# Patient Record
Sex: MALE | Race: WHITE | Employment: OTHER | URBAN - METROPOLITAN AREA
[De-identification: names, ages, dates, MRNs, and addresses within clinical notes are randomized per-mention and may not be internally consistent; named-entity substitution may affect disease eponyms.]

---

## 2017-03-16 ENCOUNTER — OFFICE VISIT (OUTPATIENT)
Dept: INTERNAL MEDICINE CLINIC | Age: 62
End: 2017-03-16
Payer: COMMERCIAL

## 2017-03-16 VITALS
BODY MASS INDEX: 35.29 KG/M2 | HEIGHT: 78 IN | WEIGHT: 305 LBS | DIASTOLIC BLOOD PRESSURE: 76 MMHG | SYSTOLIC BLOOD PRESSURE: 122 MMHG

## 2017-03-16 DIAGNOSIS — E03.9 HYPOTHYROIDISM, UNSPECIFIED TYPE: ICD-10-CM

## 2017-03-16 DIAGNOSIS — I10 ESSENTIAL HYPERTENSION: Primary | ICD-10-CM

## 2017-03-16 DIAGNOSIS — J84.10 POSTINFLAMMATORY PULMONARY FIBROSIS (HCC): ICD-10-CM

## 2017-03-16 DIAGNOSIS — Z79.52 CURRENT CHRONIC USE OF SYSTEMIC STEROIDS: ICD-10-CM

## 2017-03-16 PROCEDURE — 1036F TOBACCO NON-USER: CPT | Performed by: INTERNAL MEDICINE

## 2017-03-16 PROCEDURE — G8484 FLU IMMUNIZE NO ADMIN: HCPCS | Performed by: INTERNAL MEDICINE

## 2017-03-16 PROCEDURE — 3017F COLORECTAL CA SCREEN DOC REV: CPT | Performed by: INTERNAL MEDICINE

## 2017-03-16 PROCEDURE — G8419 CALC BMI OUT NRM PARAM NOF/U: HCPCS | Performed by: INTERNAL MEDICINE

## 2017-03-16 PROCEDURE — G8427 DOCREV CUR MEDS BY ELIG CLIN: HCPCS | Performed by: INTERNAL MEDICINE

## 2017-03-16 PROCEDURE — 99213 OFFICE O/P EST LOW 20 MIN: CPT | Performed by: INTERNAL MEDICINE

## 2017-03-16 RX ORDER — IBUPROFEN 200 MG
200 TABLET ORAL EVERY 6 HOURS PRN
Qty: 120 TABLET | Refills: 3 | Status: SHIPPED | OUTPATIENT
Start: 2017-03-16 | End: 2017-09-19 | Stop reason: ALTCHOICE

## 2017-03-20 RX ORDER — IBUPROFEN 800 MG/1
800 TABLET ORAL 2 TIMES DAILY
Qty: 120 TABLET | Refills: 1 | Status: SHIPPED | OUTPATIENT
Start: 2017-03-20 | End: 2017-10-02 | Stop reason: SDUPTHER

## 2017-03-27 DIAGNOSIS — Z79.52 CURRENT CHRONIC USE OF SYSTEMIC STEROIDS: ICD-10-CM

## 2017-04-03 DIAGNOSIS — E03.9 HYPOTHYROIDISM: ICD-10-CM

## 2017-04-04 RX ORDER — LEVOTHYROXINE SODIUM 0.03 MG/1
TABLET ORAL
Qty: 90 TABLET | Refills: 0 | Status: SHIPPED | OUTPATIENT
Start: 2017-04-04 | End: 2017-07-02 | Stop reason: SDUPTHER

## 2017-04-17 RX ORDER — LANSOPRAZOLE 30 MG/1
CAPSULE, DELAYED RELEASE ORAL
Qty: 30 CAPSULE | Refills: 3 | Status: SHIPPED | OUTPATIENT
Start: 2017-04-17 | End: 2017-10-02 | Stop reason: SDUPTHER

## 2017-04-28 DIAGNOSIS — I15.9 SECONDARY HYPERTENSION, UNSPECIFIED: ICD-10-CM

## 2017-04-28 RX ORDER — BENAZEPRIL HYDROCHLORIDE 40 MG/1
TABLET, FILM COATED ORAL
Qty: 90 TABLET | Refills: 3 | Status: SHIPPED | OUTPATIENT
Start: 2017-04-28 | End: 2018-05-29 | Stop reason: SDUPTHER

## 2017-05-04 ENCOUNTER — OFFICE VISIT (OUTPATIENT)
Dept: INTERNAL MEDICINE CLINIC | Age: 62
End: 2017-05-04
Payer: COMMERCIAL

## 2017-05-04 VITALS
BODY MASS INDEX: 34.94 KG/M2 | SYSTOLIC BLOOD PRESSURE: 130 MMHG | HEIGHT: 78 IN | DIASTOLIC BLOOD PRESSURE: 84 MMHG | WEIGHT: 302 LBS

## 2017-05-04 DIAGNOSIS — J22 ACUTE LOWER RESPIRATORY TRACT INFECTION: Primary | ICD-10-CM

## 2017-05-04 PROCEDURE — 1036F TOBACCO NON-USER: CPT | Performed by: INTERNAL MEDICINE

## 2017-05-04 PROCEDURE — 3017F COLORECTAL CA SCREEN DOC REV: CPT | Performed by: INTERNAL MEDICINE

## 2017-05-04 PROCEDURE — G8427 DOCREV CUR MEDS BY ELIG CLIN: HCPCS | Performed by: INTERNAL MEDICINE

## 2017-05-04 PROCEDURE — 99213 OFFICE O/P EST LOW 20 MIN: CPT | Performed by: INTERNAL MEDICINE

## 2017-05-04 PROCEDURE — G8417 CALC BMI ABV UP PARAM F/U: HCPCS | Performed by: INTERNAL MEDICINE

## 2017-05-04 RX ORDER — AZITHROMYCIN 250 MG/1
250 TABLET, FILM COATED ORAL DAILY
Qty: 10 TABLET | Refills: 0 | Status: SHIPPED | OUTPATIENT
Start: 2017-05-04 | End: 2017-05-14

## 2017-05-04 RX ORDER — LEVOFLOXACIN 500 MG/1
TABLET, FILM COATED ORAL
COMMUNITY
Start: 2017-04-03 | End: 2017-05-04 | Stop reason: ALTCHOICE

## 2017-05-04 RX ORDER — PREDNISONE 20 MG/1
TABLET ORAL
Qty: 15 TABLET | Refills: 0 | Status: SHIPPED | OUTPATIENT
Start: 2017-05-04 | End: 2017-09-19 | Stop reason: SDUPTHER

## 2017-05-04 RX ORDER — AMOXICILLIN AND CLAVULANATE POTASSIUM 875; 125 MG/1; MG/1
1 TABLET, FILM COATED ORAL 2 TIMES DAILY
Qty: 20 TABLET | Refills: 0 | Status: SHIPPED | OUTPATIENT
Start: 2017-05-04 | End: 2017-05-14

## 2017-05-04 ASSESSMENT — PATIENT HEALTH QUESTIONNAIRE - PHQ9
SUM OF ALL RESPONSES TO PHQ QUESTIONS 1-9: 0
SUM OF ALL RESPONSES TO PHQ9 QUESTIONS 1 & 2: 0
2. FEELING DOWN, DEPRESSED OR HOPELESS: 0
1. LITTLE INTEREST OR PLEASURE IN DOING THINGS: 0

## 2017-05-05 DIAGNOSIS — I15.9 SECONDARY HYPERTENSION, UNSPECIFIED: ICD-10-CM

## 2017-05-11 RX ORDER — AMLODIPINE BESYLATE 10 MG/1
TABLET ORAL
Qty: 90 TABLET | Refills: 3 | Status: SHIPPED | OUTPATIENT
Start: 2017-05-11 | End: 2018-05-09 | Stop reason: SDUPTHER

## 2017-07-02 DIAGNOSIS — E03.9 HYPOTHYROIDISM: ICD-10-CM

## 2017-07-05 RX ORDER — LEVOTHYROXINE SODIUM 0.03 MG/1
TABLET ORAL
Qty: 90 TABLET | Refills: 0 | Status: SHIPPED | OUTPATIENT
Start: 2017-07-05 | End: 2017-09-19 | Stop reason: SDUPTHER

## 2017-07-09 DIAGNOSIS — E03.9 HYPOTHYROIDISM: ICD-10-CM

## 2017-07-10 RX ORDER — LEVOTHYROXINE SODIUM 0.03 MG/1
TABLET ORAL
Qty: 90 TABLET | Refills: 3 | Status: SHIPPED | OUTPATIENT
Start: 2017-07-10 | End: 2018-07-30 | Stop reason: SDUPTHER

## 2017-09-19 ENCOUNTER — OFFICE VISIT (OUTPATIENT)
Dept: INTERNAL MEDICINE CLINIC | Age: 62
End: 2017-09-19
Payer: COMMERCIAL

## 2017-09-19 VITALS
HEIGHT: 78 IN | WEIGHT: 297 LBS | BODY MASS INDEX: 34.36 KG/M2 | SYSTOLIC BLOOD PRESSURE: 132 MMHG | DIASTOLIC BLOOD PRESSURE: 78 MMHG

## 2017-09-19 DIAGNOSIS — N52.9 ERECTILE DYSFUNCTION, UNSPECIFIED ERECTILE DYSFUNCTION TYPE: ICD-10-CM

## 2017-09-19 DIAGNOSIS — Z23 NEED FOR VACCINATION: ICD-10-CM

## 2017-09-19 DIAGNOSIS — G89.29 CHRONIC BILATERAL LOW BACK PAIN WITHOUT SCIATICA: ICD-10-CM

## 2017-09-19 DIAGNOSIS — I10 ESSENTIAL HYPERTENSION: Primary | ICD-10-CM

## 2017-09-19 DIAGNOSIS — M54.50 CHRONIC BILATERAL LOW BACK PAIN WITHOUT SCIATICA: ICD-10-CM

## 2017-09-19 DIAGNOSIS — T38.0X5A STEROID MYOPATHY: ICD-10-CM

## 2017-09-19 DIAGNOSIS — G72.0 STEROID MYOPATHY: ICD-10-CM

## 2017-09-19 DIAGNOSIS — F33.42 RECURRENT MAJOR DEPRESSIVE EPISODES, IN FULL REMISSION (HCC): ICD-10-CM

## 2017-09-19 DIAGNOSIS — J84.10 POSTINFLAMMATORY PULMONARY FIBROSIS (HCC): ICD-10-CM

## 2017-09-19 PROCEDURE — 1036F TOBACCO NON-USER: CPT | Performed by: INTERNAL MEDICINE

## 2017-09-19 PROCEDURE — G8417 CALC BMI ABV UP PARAM F/U: HCPCS | Performed by: INTERNAL MEDICINE

## 2017-09-19 PROCEDURE — 99213 OFFICE O/P EST LOW 20 MIN: CPT | Performed by: INTERNAL MEDICINE

## 2017-09-19 PROCEDURE — 3017F COLORECTAL CA SCREEN DOC REV: CPT | Performed by: INTERNAL MEDICINE

## 2017-09-19 PROCEDURE — 90686 IIV4 VACC NO PRSV 0.5 ML IM: CPT | Performed by: INTERNAL MEDICINE

## 2017-09-19 PROCEDURE — 90471 IMMUNIZATION ADMIN: CPT | Performed by: INTERNAL MEDICINE

## 2017-09-19 PROCEDURE — G8427 DOCREV CUR MEDS BY ELIG CLIN: HCPCS | Performed by: INTERNAL MEDICINE

## 2017-09-19 RX ORDER — B-COMPLEX WITH VITAMIN C
1 TABLET ORAL DAILY
Qty: 30 TABLET | Refills: 0 | Status: SHIPPED | OUTPATIENT
Start: 2017-09-19 | End: 2020-01-01

## 2017-09-19 RX ORDER — SILDENAFIL 100 MG/1
100 TABLET, FILM COATED ORAL PRN
Qty: 30 TABLET | Refills: 3 | Status: SHIPPED | OUTPATIENT
Start: 2017-09-19 | End: 2020-01-01

## 2017-09-19 NOTE — MR AVS SNAPSHOT
After Visit Summary             Rocio Nascimento   2017 9:00 AM   Office Visit    Description:  Male : 1955   Provider:  Kenya Olvera MD   Department:  [facility]              Your Follow-Up and Future Appointments         Below is a list of your follow-up and future appointments. This may not be a complete list as you may have made appointments directly with providers that we are not aware of or your providers may have made some for you. Please call your providers to confirm appointments. It is important to keep your appointments. Please bring your current insurance card, photo ID, co-pay, and all medication bottles to your appointment. If self-pay, payment is expected at the time of service. Your To-Do List     Future Appointments Provider Department Dept Phone    3/20/2018 9:00 AM Kenya Olvera MD [facility] [phone]    Please arrive 15 minutes prior to appointment, bring photo ID and insurance card. Information from Your Visit        Department     Name Address Phone Fax    [facility] [id number] [phone]      You Were Seen for:         Comments    Need for vaccination   [[id number]]         Vital Signs     Blood Pressure Height Weight Body Mass Index Smoking Status       132/78 6' 6.35\" (1.99 m) 297 lb (134.7 kg) 34.02 kg/m2 Former Smoker       Additional Information about your Body Mass Index (BMI)           Your BMI as listed above is considered obese (30 or more). BMI is an estimate of body fat, calculated from your height and weight. The higher your BMI, the greater your risk of heart disease, high blood pressure, type 2 diabetes, stroke, gallstones, arthritis, sleep apnea, and certain cancers. BMI is not perfect. It may overestimate body fat in athletes and people who are more muscular.   Even a small weight loss (between 5 and 10 amLODIPine (NORVASC) 10 MG tablet TAKE ONE TABLET BY MOUTH ONCE DAILY    benazepril (LOTENSIN) 40 MG tablet TAKE ONE TABLET BY MOUTH ONCE DAILY    lansoprazole (PREVACID) 30 MG delayed release capsule TAKE ONE CAPSULE BY MOUTH DAILY    ibuprofen (ADVIL;MOTRIN) 800 MG tablet Take 1 tablet by mouth 2 times daily    PROAIR  (90 BASE) MCG/ACT inhaler     furosemide (LASIX) 20 MG tablet Take 1 tablet by mouth daily    predniSONE (DELTASONE) 20 MG tablet Take 20 mg by mouth daily     hydrALAZINE (APRESOLINE) 25 MG tablet Take 1 tablet by mouth 3 times daily as needed. sodium chloride (ALTAMIST SPRAY) 0.65 % nasal spray 1 spray by Nasal route as needed for Congestion. DULoxetine (CYMBALTA) 30 MG capsule       Allergies           No Known Allergies      We Ordered/Performed the following           INFLUENZA, QUADV, 3 YRS AND OLDER, IM, PF, PREFILL SYR OR SDV, 0.5ML (FLUZONE QUADV, PF)     [facility] [employer]     Scheduling Instructions:    [facility]  [address], [address]  [phone]    Comments: The patient can be scheduled with any member of the group, including the provider with the first available appointments.           Additional Information        Basic Information     Date Of Birth Sex Race Ethnicity Preferred Language    1955 Male White Non-/Non  English      Problem List as of 9/19/2017  Date Reviewed: 9/19/2017                Bronchiectasis ([address]    Hernia, umbilical    Myopathy    Essential hypertension    Depressive disorder, not elsewhere classified    Impotence of organic origin    Hypothyroidism    Insomnia    Postinflammatory pulmonary fibrosis ([address]      Immunizations as of 9/19/2017     Name Date    Influenza Vaccine, unspecified formulation 10/5/2016    Influenza Virus Vaccine 9/21/2015, 9/25/2014    Influenza, Quadrivalent, 3 yrs and above, IM, Preservative Free 9/19/2017    Tdap (Boostrix, Adacel) 10/5/2016

## 2017-09-19 NOTE — PROGRESS NOTES
Hypothyroidism     The patient reports HTN been well controlled on current medication. No complaints of dizziness, shortness of breath or chest pain. The patient does not check his BP at home on hydralazine and norvasc    pulm fibrosis stable daily predniosone   Ct chest no changes       Review of Systems  Past Medical History:   Diagnosis Date    Depressive disorder, not elsewhere classified     Impotence of organic origin     Insomnia, unspecified     Myopathy, unspecified     Postinflammatory pulmonary fibrosis ([address]     Unspecified essential hypertension     Unspecified hypothyroidism      Social History   Substance Use Topics    Smoking status: Former Smoker     Quit date: 2/18/2005    Smokeless tobacco: Never Used    Alcohol use 0.0 oz/week     0 Standard drinks or equivalent per week      Comment: every weekend- 6 [employer]       ROS  CVS no chest pain no sob no orthopnea  Resp no cough   General no weight loss no fatigue no fever      Objective:   Physical Exam    Blood pressure 132/78, height 6' 6.35\" (1.99 m), weight 297 lb (134.7 kg). General Appearance NAD conversant  Neck FROM supple no JVD  Lungs normal effort Clear to auscultation  Cardio regular rhythm no murmur  Abdomen Soft nontender no HSM  Ext no edema no cyanosis no clubbing   Skin no rashes no ulcers  Neuro no focal deficits   Musculoskeletal no spinal tenderness no kyphosis     Ct scan     Findings:          The lungs again demonstrate severe pulmonary fibrosis.  There are multiple areas of honeycombing again noted.  Extensive groundglass opacity and septal thickening again noted.  There is bronchiectasis.  All of these findings are stable.  I see no definite new, superimposed acute findings.  There are    several 1.5 cm or less mediastinal lymph nodes which are stable.  The pericardium is unremarkable.  There are gallstones in the gallbladder.        Impression:    1.  Essentially stable exam.      Assessment:      1.  Essential hypertension control good     2. Need for vaccination  INFLUENZA, QUADV, 3 YRS AND OLDER, IM, PF, PREFILL SYR OR SDV, 0.5ML (FLUZONE QUADV, PF)   3. 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 Chronic bilateral low back pain without sciatica  [facility]    Calcium Carbonate-Vitamin D (OYSTER SHELL CALCIUM/D) 500-200 MG-UNIT TABS   5. Erectile dysfunction, unspecified erectile dysfunction type  sildenafil (VIAGRA) 100 MG tablet   6.  Postinflammatory pulmonary fibrosis (HCC)  Need continued prednisone          Plan:

## 2017-09-28 ENCOUNTER — HOSPITAL ENCOUNTER (OUTPATIENT)
Dept: PAIN MANAGEMENT | Age: 62
Discharge: HOME | End: 2017-09-28
Payer: COMMERCIAL

## 2017-09-28 VITALS
WEIGHT: 297 LBS | BODY MASS INDEX: 34.36 KG/M2 | TEMPERATURE: 98.1 F | RESPIRATION RATE: 22 BRPM | HEART RATE: 75 BPM | OXYGEN SATURATION: 90 % | DIASTOLIC BLOOD PRESSURE: 91 MMHG | SYSTOLIC BLOOD PRESSURE: 128 MMHG | HEIGHT: 78 IN

## 2017-09-28 DIAGNOSIS — T38.0X5A STEROID MYOPATHY: ICD-10-CM

## 2017-09-28 DIAGNOSIS — Z79.52 CURRENT CHRONIC USE OF SYSTEMIC STEROIDS: Primary | ICD-10-CM

## 2017-09-28 DIAGNOSIS — J84.10 POSTINFLAMMATORY PULMONARY FIBROSIS (HCC): ICD-10-CM

## 2017-09-28 DIAGNOSIS — G72.0 STEROID MYOPATHY: ICD-10-CM

## 2017-09-28 PROCEDURE — 99203 OFFICE O/P NEW LOW 30 MIN: CPT

## 2017-09-28 PROCEDURE — 80307 DRUG TEST PRSMV CHEM ANLYZR: CPT

## 2017-09-28 PROCEDURE — 99204 OFFICE O/P NEW MOD 45 MIN: CPT | Performed by: PAIN MEDICINE

## 2017-09-28 ASSESSMENT — PAIN DESCRIPTION - DESCRIPTORS: DESCRIPTORS: THROBBING;ACHING

## 2017-09-28 ASSESSMENT — PAIN DESCRIPTION - ONSET: ONSET: ON-GOING

## 2017-09-28 ASSESSMENT — ENCOUNTER SYMPTOMS
SHORTNESS OF BREATH: 1
GASTROINTESTINAL NEGATIVE: 1
SORE THROAT: 0
BACK PAIN: 1

## 2017-09-28 ASSESSMENT — PAIN SCALES - GENERAL: PAINLEVEL_OUTOF10: 7

## 2017-09-28 ASSESSMENT — ACTIVITIES OF DAILY LIVING (ADL): EFFECT OF PAIN ON DAILY ACTIVITIES: PAIN INCREASES WITH PHYSICAL ACTIVITY

## 2017-09-28 ASSESSMENT — PAIN DESCRIPTION - FREQUENCY: FREQUENCY: CONTINUOUS

## 2017-09-28 ASSESSMENT — PAIN DESCRIPTION - PROGRESSION: CLINICAL_PROGRESSION: GRADUALLY WORSENING

## 2017-09-28 ASSESSMENT — PAIN DESCRIPTION - ORIENTATION: ORIENTATION: LOWER;RIGHT;LEFT

## 2017-09-28 ASSESSMENT — PAIN DESCRIPTION - LOCATION: LOCATION: HIP;KNEE;ANKLE;SHOULDER

## 2017-09-28 ASSESSMENT — PAIN DESCRIPTION - PAIN TYPE: TYPE: CHRONIC PAIN

## 2017-10-02 RX ORDER — IBUPROFEN 800 MG/1
TABLET ORAL
Qty: 120 TABLET | Refills: 3 | Status: SHIPPED | OUTPATIENT
Start: 2017-10-02 | End: 2018-02-19

## 2017-10-03 LAB
6-ACETYLMORPHINE, UR: NOT DETECTED
7-AMINOCLONAZEPAM, URINE: NOT DETECTED
ALPHA-OH-ALPRAZ, URINE: NOT DETECTED
ALPRAZOLAM, URINE: NOT DETECTED
AMPHETAMINES, URINE: NOT DETECTED
BARBITURATES, URINE: NOT DETECTED
BENZOYLECGONINE, UR: NOT DETECTED
BUPRENORPHINE URINE: NOT DETECTED
CARISOPRODOL, UR: NOT DETECTED
CLONAZEPAM, URINE: NOT DETECTED
CODEINE, URINE: NOT DETECTED
CREATININE URINE: 207.4 MG/DL (ref 20–400)
DIAZEPAM, URINE: NOT DETECTED
DRUGS EXPECTED, UR: NORMAL
EER HI RES INTERP UR: NORMAL
ETHYL GLUCURONIDE UR: NOT DETECTED
FENTANYL URINE: NOT DETECTED
HYDROCODONE, URINE: NOT DETECTED
HYDROMORPHONE, URINE: NOT DETECTED
LORAZEPAM, URINE: NOT DETECTED
MARIJUANA METAB, UR: NOT DETECTED
MDA, UR: NOT DETECTED
MDEA, EVE, UR: NOT DETECTED
MDMA URINE: NOT DETECTED
MEPERIDINE METAB, UR: NOT DETECTED
METHADONE, URINE: NOT DETECTED
METHAMPHETAMINE, URINE: NOT DETECTED
METHYLPHENIDATE: NOT DETECTED
MIDAZOLAM, URINE: NOT DETECTED
MORPHINE URINE: NOT DETECTED
NORBUPRENORPHINE, URINE: NOT DETECTED
NORDIAZEPAM, URINE: NOT DETECTED
NORFENTANYL, URINE: NOT DETECTED
NORHYDROCODONE, URINE: NOT DETECTED
NOROXYCODONE, URINE: NOT DETECTED
NOROXYMORPHONE, URINE: NOT DETECTED
OXAZEPAM, URINE: NOT DETECTED
OXYCODONE URINE: NOT DETECTED
OXYMORPHONE, URINE: NOT DETECTED
PAIN MANAGEMENT DRUG PANEL INTERP, URINE: NORMAL
PAIN MGT DRUG PANEL, HI RES, UR: NORMAL
PCP,URINE: NOT DETECTED
PHENTERMINE, UR: NOT DETECTED
PROPOXYPHENE, URINE: NOT DETECTED
TAPENTADOL, URINE: NOT DETECTED
TAPENTADOL-O-SULFATE, URINE: NOT DETECTED
TEMAZEPAM, URINE: NOT DETECTED
TRAMADOL, URINE: NOT DETECTED
ZOLPIDEM, URINE: NOT DETECTED

## 2017-10-03 RX ORDER — LANSOPRAZOLE 30 MG/1
CAPSULE, DELAYED RELEASE ORAL
Qty: 30 CAPSULE | Refills: 5 | Status: SHIPPED | OUTPATIENT
Start: 2017-10-03 | End: 2018-05-02

## 2017-10-12 ENCOUNTER — HOSPITAL ENCOUNTER (OUTPATIENT)
Dept: PAIN MANAGEMENT | Age: 62
Discharge: HOME | End: 2017-10-12
Payer: COMMERCIAL

## 2017-10-12 VITALS
SYSTOLIC BLOOD PRESSURE: 115 MMHG | RESPIRATION RATE: 22 BRPM | HEIGHT: 78 IN | WEIGHT: 300 LBS | DIASTOLIC BLOOD PRESSURE: 70 MMHG | HEART RATE: 81 BPM | TEMPERATURE: 98.3 F | OXYGEN SATURATION: 92 % | BODY MASS INDEX: 34.71 KG/M2

## 2017-10-12 DIAGNOSIS — Z79.52 CURRENT CHRONIC USE OF SYSTEMIC STEROIDS: Primary | ICD-10-CM

## 2017-10-12 DIAGNOSIS — T38.0X5A STEROID MYOPATHY: ICD-10-CM

## 2017-10-12 DIAGNOSIS — Z87.09 HISTORY OF ASBESTOSIS: ICD-10-CM

## 2017-10-12 DIAGNOSIS — J47.9 BRONCHIECTASIS WITHOUT COMPLICATION (HCC): ICD-10-CM

## 2017-10-12 DIAGNOSIS — G72.0 STEROID MYOPATHY: ICD-10-CM

## 2017-10-12 DIAGNOSIS — J84.10 POSTINFLAMMATORY PULMONARY FIBROSIS (HCC): ICD-10-CM

## 2017-10-12 DIAGNOSIS — M19.90 ARTHRITIS: ICD-10-CM

## 2017-10-12 PROCEDURE — 99214 OFFICE O/P EST MOD 30 MIN: CPT | Performed by: PAIN MEDICINE

## 2017-10-12 PROCEDURE — 99213 OFFICE O/P EST LOW 20 MIN: CPT

## 2017-10-12 RX ORDER — GABAPENTIN 300 MG/1
300 CAPSULE ORAL 3 TIMES DAILY
Qty: 90 CAPSULE | Refills: 3 | Status: SHIPPED | OUTPATIENT
Start: 2017-10-12 | End: 2018-02-19 | Stop reason: ALTCHOICE

## 2017-10-12 ASSESSMENT — ENCOUNTER SYMPTOMS
GASTROINTESTINAL NEGATIVE: 1
SORE THROAT: 0
BLURRED VISION: 1
NAUSEA: 0
HEARTBURN: 0
BACK PAIN: 1
SHORTNESS OF BREATH: 1
VOMITING: 0

## 2017-10-12 ASSESSMENT — PAIN DESCRIPTION - ONSET: ONSET: ON-GOING

## 2017-10-12 ASSESSMENT — PAIN DESCRIPTION - PAIN TYPE: TYPE: CHRONIC PAIN

## 2017-10-12 ASSESSMENT — PAIN DESCRIPTION - PROGRESSION: CLINICAL_PROGRESSION: GRADUALLY WORSENING

## 2017-10-12 ASSESSMENT — PAIN SCALES - GENERAL: PAINLEVEL_OUTOF10: 7

## 2017-10-12 ASSESSMENT — ACTIVITIES OF DAILY LIVING (ADL): EFFECT OF PAIN ON DAILY ACTIVITIES: PAIN INCREASES W/PHYSICAL ACTIVIITY

## 2017-10-12 ASSESSMENT — PAIN DESCRIPTION - DESCRIPTORS: DESCRIPTORS: CONSTANT;THROBBING;ACHING

## 2017-10-12 ASSESSMENT — PAIN DESCRIPTION - ORIENTATION: ORIENTATION: LOWER;LEFT

## 2017-10-12 NOTE — PROGRESS NOTES
[facility]  Patient Pain Assessment  Consultation - No att. providers found    Primary Care Physician: Anibal Monterroso MD    Chief complaint:   Chief Complaint   Patient presents with    Generalized Body Aches     shoulder, knees, ankles,     Back Pain   . HISTORY OF PRESENT ILLNESS:  Author Mau is 65 y.o. male with     patient is a 71-year-old male who has history of  Severe COPD, asbestosis. Pain involving several joints in the body. He is taking Motrin  For pain which is helping. Patient is a distress and is on supplemental O2 by nasal cannula because of his pulmonary condition. Patient is On prednisone. Patient was previously seen in the pain clinic and was on Vicodin  1/2 tablet by mouth every 6 hoursfor pain control  in 2008. Patient's pain is diffuse and involves several joints. Pain is aggravated with any type of activity. He also might help muscle pain probably from steroid use. Muscle Pain   This is a chronic problem. The current episode started more than 1 year ago. The problem occurs constantly. The problem has been gradually worsening since onset. Associated with: chronic steroid use. Pain location: all joints, lower back, muscle pain all extremities. The pain is medium (7/10). The symptoms are aggravated by any movement. Associated symptoms include headaches and shortness of breath (02 @ 3l/m continuous). Pertinent negatives include no chest pain, fever, nausea, rash, sensory change or vomiting. Past treatments include prescription NSAID and prescription narcotic. The treatment provided mild relief. He has been behaving normally. His past medical history is significant for chronic back pain. (Pulm fibrosis on chronic steroids)       OARRS compliant?  yes  Concern for prescription abuse?no    Current Pain Assessment  Pain Assessment  Pain Assessment: 0-10  Pain Level: 7  Pain Type: Chronic pain  Pain Location: Back, Ankle, Knee, Shoulder, Hip, Generalized  Pain Orientation: Lower, Left  Pain Radiating Towards: none  Pain Descriptors: Constant, Throbbing, Aching  Pain Onset: On-going  Clinical Progression: Gradually worsening  Effect of Pain on Daily Activities: Pain increases w/physical activiity  Patient's Stated Pain Goal: 2 (To be able to do daily activiity & walk w/less pain)  Pain Intervention(s): Medication (see eMar), Rest, Repositioned, Massage, Shower, Elevation, Heat applied                    ADVERSE MEDICATION EFFECTS:   Constipation: no  Bowel Regimen: Yes  Diet: common adult  Appetite:  ok  Sedation:  not applicable  Urinary Retention: no    FOCUSED PAIN SCALE:  Highest : 8  Lowest :4  Average: Range-5  When and What  was your last procedure:  n/a    Was your procedure effective:  not applicable    ACTIVITY/SOCIAL/EMOTIONAL:  Sleep Pattern: 8 hours per night.  generally restful sleep  Energy Level:  Tired/Fatigued  Currently attending Physical Therapy:  No  Home Exercises: daily weightlifting and hand wts only  Mobility: walking increases pain  Currently seeing a Psychiatrist or Psychologist:  Yes,  Emotional Issues: anxiety/ nervousness   Mood: depressed     ABERRANT BEHAVIORS SINCE LAST VISIT:  Have you ever been treated in another Pain Clinic yes, here 2008, 2010  Refills for prescriptions appropriate: not done  Lost rx/pills: no  Taking more medication than prescribed:  not applicable  Are you receiving PAIN medications from  other doctors: no  Last Urine/Serum Drug Screen :9/28/17  Was Serum/UDS as anticipated?  not applicable  Brought pill bottles in :not applicable   Was Pill count appropriate? :not applicable   Are currently pregnant? not applicable  Recent ER visits: No             Past Medical History      Diagnosis Date    Depressive disorder, not elsewhere classified     Impotence of organic origin     Insomnia, unspecified     Myopathy, unspecified     Postinflammatory pulmonary fibrosis ([facility]     Unspecified essential hypertension     Unspecified hypothyroidism        Surgical History  Past Surgical History:   Procedure Laterality Date    LUNG BIOPSY Left        Medications  Current Outpatient Prescriptions   Medication Sig Dispense Refill    gabapentin (NEURONTIN) 300 MG capsule Take 1 capsule by mouth 3 times daily 90 capsule 3    lansoprazole (PREVACID) 30 MG delayed release capsule TAKE 1 CAPSULE BY MOUTH ONE TIME A DAY  30 capsule 5    ibuprofen (ADVIL;MOTRIN) 800 MG tablet TAKE ONE TABLET BY MOUTH TWICE DAILY 120 tablet 3    ZOLPIDEM TARTRATE PO Take by mouth Taking 12.5mg at night      Calcium Carbonate-Vitamin D (OYSTER SHELL CALCIUM/D) 500-200 MG-UNIT TABS Take 1 tablet by mouth daily 30 tablet 0    sildenafil (VIAGRA) 100 MG tablet Take 1 tablet by mouth as needed for Erectile Dysfunction 30 tablet 3    levothyroxine (SYNTHROID) 25 MCG tablet TAKE ONE TABLET BY MOUTH ONCE DAILY 90 tablet 3    amLODIPine (NORVASC) 10 MG tablet TAKE ONE TABLET BY MOUTH ONCE DAILY 90 tablet 3    benazepril (LOTENSIN) 40 MG tablet TAKE ONE TABLET BY MOUTH ONCE DAILY 90 tablet 3    PROAIR  (90 BASE) MCG/ACT inhaler       furosemide (LASIX) 20 MG tablet Take 1 tablet by mouth daily 90 tablet 3    predniSONE (DELTASONE) 20 MG tablet Take 20 mg by mouth daily       hydrALAZINE (APRESOLINE) 25 MG tablet Take 1 tablet by mouth 3 times daily as needed. 90 tablet 3    sodium chloride (ALTAMIST SPRAY) 0.65 % nasal spray 1 spray by Nasal route as needed for Congestion. 1 Bottle 3    DULoxetine (CYMBALTA) 30 MG capsule   0     No current facility-administered medications for this encounter. Allergies  Review of patient's allergies indicates no known allergies. Family History  family history includes Cancer in his mother and sister; Heart Disease in an other family member; High Blood Pressure in his father and mother; Stroke in his mother.     Social History  Social History     Social History    Marital Muscle Strength   Biceps: 5/5     Other   Erythema: absent  Scars: absent  Sensation: normal  Pulse: present      Left Shoulder Exam     Tenderness   The patient is experiencing no tenderness. Range of Motion   Active Abduction: abnormal Left shoulder active abduction: painful, limited. Muscle Strength   Biceps: 5/5     Other   Erythema: absent  Scars: absent  Sensation: normal  Pulse: present             DATA  Labs:  10/3/2017  4:16 PM - Zaid, Tresapn Incoming Lab Results From [facility]     Component Results     Component Value Ref Range & Units Status Collected Lab   Pain Management Drug Panel Interp, Urine Consistent   Final 09/28/2017  9:30 AM [facility]   (NOTE)   ________________________________________________________________   DRUGS EXPECTED:   NO DRUGS EXPECTED   ________________________________________________________________   CONSISTENT with medications provided:   NO DRUGS DETECTED   ________________________________________________________________   INTERPRETIVE INFORMATION: Pain Mgt Lorenz, Mass Spec/EMIT, Ur,        Imaging:  Radiology Images and Reports reviewed where indicated and necessary  TECHNIQUE: Bone density (DEXA) was performed on the lumbar spine and/or hip and/or forearm region where indicated. Bone mineral density (BMD) values for each site are expressed in gm/cm2 and shown on accompanying graphs and tables. These values are compared to the [facility]) standards that state that BMD values at or below negative (-) 2.5 standard deviations (T - score -2.5) from a young adult ethnicity-matched population are indicative of osteoporosis, and   BMD values at or below negative (-) 1.0 standard deviations (T - score -1.0) from a young adult ethnicity-matched population are indicative of osteopenia. The comparison of this patient's values to the [facility] standards is given in the impression.  When possible, this study has been compared to any prior DEXA

## 2017-10-17 ENCOUNTER — TELEPHONE (OUTPATIENT)
Dept: PAIN MANAGEMENT | Age: 62
End: 2017-10-17

## 2017-10-17 NOTE — TELEPHONE ENCOUNTER
Patient left voice message today, relating he had started a new medication ordered for him at last office visit. Has stopped it due to side effects. Contacted pt by phone at 1600 Confirms stopped gabapentin 300 milligram strength today r/t drowsiness, dizziness, and blurred vision. Reports felt medication was ineffective. Assured patient he was right to stop medication. Advised will inform Dr Sydney Wright of his call; he is to call if further problems. Voices agreement, relating he returns to this office 11/13/17.  [employer] RN

## 2017-10-26 ENCOUNTER — TELEPHONE (OUTPATIENT)
Dept: INTERNAL MEDICINE CLINIC | Age: 62
End: 2017-10-26

## 2017-10-26 RX ORDER — AZITHROMYCIN 250 MG/1
TABLET, FILM COATED ORAL
Qty: 1 PACKET | Refills: 0 | Status: SHIPPED | OUTPATIENT
Start: 2017-10-26 | End: 2017-11-05

## 2017-11-21 ENCOUNTER — TELEPHONE (OUTPATIENT)
Dept: UROLOGY | Age: 62
End: 2017-11-21

## 2017-11-21 ENCOUNTER — OFFICE VISIT (OUTPATIENT)
Dept: UROLOGY | Age: 62
End: 2017-11-21
Payer: COMMERCIAL

## 2017-11-21 VITALS
HEIGHT: 78 IN | DIASTOLIC BLOOD PRESSURE: 57 MMHG | SYSTOLIC BLOOD PRESSURE: 87 MMHG | HEART RATE: 82 BPM | WEIGHT: 299.83 LBS | BODY MASS INDEX: 34.69 KG/M2 | TEMPERATURE: 97.8 F

## 2017-11-21 DIAGNOSIS — R10.9 FLANK PAIN: ICD-10-CM

## 2017-11-21 DIAGNOSIS — N20.1 URETERAL STONE: Primary | ICD-10-CM

## 2017-11-21 PROCEDURE — 99204 OFFICE O/P NEW MOD 45 MIN: CPT | Performed by: UROLOGY

## 2017-11-21 PROCEDURE — G8417 CALC BMI ABV UP PARAM F/U: HCPCS | Performed by: UROLOGY

## 2017-11-21 PROCEDURE — 3017F COLORECTAL CA SCREEN DOC REV: CPT | Performed by: UROLOGY

## 2017-11-21 PROCEDURE — 1036F TOBACCO NON-USER: CPT | Performed by: UROLOGY

## 2017-11-21 PROCEDURE — G8427 DOCREV CUR MEDS BY ELIG CLIN: HCPCS | Performed by: UROLOGY

## 2017-11-21 PROCEDURE — G8484 FLU IMMUNIZE NO ADMIN: HCPCS | Performed by: UROLOGY

## 2017-11-21 RX ORDER — TAMSULOSIN HYDROCHLORIDE 0.4 MG/1
0.4 CAPSULE ORAL DAILY
COMMUNITY
Start: 2017-11-18 | End: 2018-02-19 | Stop reason: ALTCHOICE

## 2017-11-21 RX ORDER — ONDANSETRON 4 MG/1
1 TABLET, ORALLY DISINTEGRATING ORAL EVERY 6 HOURS PRN
COMMUNITY
Start: 2017-11-18 | End: 2018-02-19 | Stop reason: ALTCHOICE

## 2017-11-21 RX ORDER — OXYCODONE HYDROCHLORIDE AND ACETAMINOPHEN 5; 325 MG/1; MG/1
1 TABLET ORAL
COMMUNITY
Start: 2017-11-18 | End: 2017-11-25

## 2017-11-21 ASSESSMENT — ENCOUNTER SYMPTOMS
COUGH: 0
BACK PAIN: 1
EYE PAIN: 0
ABDOMINAL PAIN: 0
EYE REDNESS: 0
VOMITING: 0
SHORTNESS OF BREATH: 1
NAUSEA: 0
WHEEZING: 0
COLOR CHANGE: 0

## 2017-11-21 NOTE — PROGRESS NOTES
[facility]  [facility] - [place]  [address]  [address]  [address] [facility] [address]  Dept: [phone]  Dept Fax: [phone]    [facility] U[facility] Office Note - New patient    Patient:  Hilario Felty  YOB: 1955  Date: 11/21/2017    The patient is a 65 y.o. male who presents today for evaluation of the following problems:   Chief Complaint   Patient presents with    Nephrolithiasis     NP - pt states right flank pain started about 2 weeks ago and went to ER; CT scan performed with kulwant    referred by Cesar Nair MD.      HPI  Here to discuss kidney stone. He has been having some pain for the past few months. The pain flared up on Friday and was much worse. Pain was 8-9/10, and right flank. His pain is now improved with pain meds. No nausea or vomiting, no fevers or chills. He is only taking a few pills per day. He has a h/o stones, but has never had surgery. (Patient's old records have been requested, reviewed and summarized in today's note.)    Summary of old records: N/A    Additional History: N/A    Procedures Today: N/A    Last several PSA's:  Lab Results   Component Value Date    PSA 0.31 11/03/2016    PSA 0.35 02/19/2015    PSA 0.25 04/24/2012     Last total testosterone:  No results found for: TESTOSTERONE  Urinalysis today:  No results found for this visit on 11/21/17. AUA Symptom Score (11/21/2017):                                Last BUN and creatinine:  Lab Results   Component Value Date    BUN 13 11/03/2016     Lab Results   Component Value Date    CREATININE 0.98 11/03/2016       Additional Lab/Culture results: none    Imaging Reviewed during this Office Visit: none  (results were independently reviewed by physician and radiology report verified)    PAST MEDICAL, FAMILY AND SOCIAL HISTORY:  Past Medical History:   Diagnosis Date    Depressive disorder, not elsewhere classified     Impotence of organic origin     Insomnia, unspecified     Myopathy, unspecified     Postinflammatory pulmonary fibrosis ([facility]     Unspecified essential hypertension     Unspecified hypothyroidism      Past Surgical History:   Procedure Laterality Date    LUNG BIOPSY Left      Family History   Problem Relation Age of Onset    High Blood Pressure Mother     Cancer Mother     Stroke Mother     High Blood Pressure Father     Cancer Sister     Heart Disease Other      Family in general     Outpatient Prescriptions Marked as Taking for the 11/21/17 encounter (Office Visit) with Rito Millan MD   Medication Sig Dispense Refill    oxyCODONE-acetaminophen (PERCOCET) 5-325 MG per tablet Take 1 tablet by mouth .  tamsulosin (FLOMAX) 0.4 MG capsule Take 0.4 mg by mouth daily      gabapentin (NEURONTIN) 300 MG capsule Take 1 capsule by mouth 3 times daily 90 capsule 3    lansoprazole (PREVACID) 30 MG delayed release capsule TAKE 1 CAPSULE BY MOUTH ONE TIME A DAY  30 capsule 5    ibuprofen (ADVIL;MOTRIN) 800 MG tablet TAKE ONE TABLET BY MOUTH TWICE DAILY 120 tablet 3    ZOLPIDEM TARTRATE PO Take by mouth Taking 12.5mg at night      Calcium Carbonate-Vitamin D (OYSTER SHELL CALCIUM/D) 500-200 MG-UNIT TABS Take 1 tablet by mouth daily 30 tablet 0    sildenafil (VIAGRA) 100 MG tablet Take 1 tablet by mouth as needed for Erectile Dysfunction 30 tablet 3    levothyroxine (SYNTHROID) 25 MCG tablet TAKE ONE TABLET BY MOUTH ONCE DAILY 90 tablet 3    amLODIPine (NORVASC) 10 MG tablet TAKE ONE TABLET BY MOUTH ONCE DAILY 90 tablet 3    benazepril (LOTENSIN) 40 MG tablet TAKE ONE TABLET BY MOUTH ONCE DAILY 90 tablet 3    PROAIR  (90 BASE) MCG/ACT inhaler       predniSONE (DELTASONE) 20 MG tablet Take 20 mg by mouth daily       hydrALAZINE (APRESOLINE) 25 MG tablet Take 1 tablet by mouth 3 times daily as needed. 90 tablet 3    sodium chloride (ALTAMIST SPRAY) 0.65 % nasal spray 1 spray by Nasal route as needed for Congestion.  1 Bottle 3   

## 2017-11-25 DIAGNOSIS — I10 ESSENTIAL HYPERTENSION: Primary | ICD-10-CM

## 2017-11-25 RX ORDER — FUROSEMIDE 20 MG/1
20 TABLET ORAL DAILY
Qty: 30 TABLET | Refills: 0 | Status: SHIPPED | OUTPATIENT
Start: 2017-11-25 | End: 2018-02-19 | Stop reason: SDUPTHER

## 2017-11-25 RX ORDER — BENAZEPRIL HYDROCHLORIDE 40 MG/1
40 TABLET, FILM COATED ORAL DAILY
Qty: 30 TABLET | Refills: 3 | Status: SHIPPED | OUTPATIENT
Start: 2017-11-25 | End: 2018-02-19 | Stop reason: SDUPTHER

## 2018-01-19 ENCOUNTER — TELEPHONE (OUTPATIENT)
Dept: PAIN MANAGEMENT | Age: 63
End: 2018-01-19

## 2018-02-19 ENCOUNTER — OFFICE VISIT (OUTPATIENT)
Dept: INTERNAL MEDICINE CLINIC | Age: 63
End: 2018-02-19
Payer: COMMERCIAL

## 2018-02-19 ENCOUNTER — HOSPITAL ENCOUNTER (OUTPATIENT)
Age: 63
Setting detail: SPECIMEN
Discharge: HOME | End: 2018-02-19
Payer: COMMERCIAL

## 2018-02-19 VITALS
SYSTOLIC BLOOD PRESSURE: 134 MMHG | DIASTOLIC BLOOD PRESSURE: 82 MMHG | WEIGHT: 286 LBS | HEIGHT: 78 IN | BODY MASS INDEX: 33.09 KG/M2

## 2018-02-19 DIAGNOSIS — Z13.1 ENCOUNTER FOR SCREENING FOR DIABETES MELLITUS: Primary | ICD-10-CM

## 2018-02-19 DIAGNOSIS — G72.0 STEROID MYOPATHY: ICD-10-CM

## 2018-02-19 DIAGNOSIS — J84.10 POSTINFLAMMATORY PULMONARY FIBROSIS (HCC): ICD-10-CM

## 2018-02-19 DIAGNOSIS — T38.0X5A STEROID MYOPATHY: ICD-10-CM

## 2018-02-19 DIAGNOSIS — F33.42 RECURRENT MAJOR DEPRESSIVE EPISODES, IN FULL REMISSION (HCC): ICD-10-CM

## 2018-02-19 DIAGNOSIS — J47.9 BRONCHIECTASIS WITHOUT COMPLICATION (HCC): ICD-10-CM

## 2018-02-19 LAB
ABSOLUTE EOS #: 0.1 K/UL (ref 0–0.44)
ABSOLUTE IMMATURE GRANULOCYTE: 0.03 K/UL (ref 0–0.3)
ABSOLUTE LYMPH #: 0.92 K/UL (ref 1.1–3.7)
ABSOLUTE MONO #: 0.84 K/UL (ref 0.1–1.2)
ANION GAP SERPL CALCULATED.3IONS-SCNC: 11 MMOL/L (ref 9–17)
BASOPHILS # BLD: 1 % (ref 0–2)
BASOPHILS ABSOLUTE: 0.08 K/UL (ref 0–0.2)
BUN BLDV-MCNC: 14 MG/DL (ref 8–23)
BUN/CREAT BLD: ABNORMAL (ref 9–20)
CALCIUM SERPL-MCNC: 9 MG/DL (ref 8.6–10.4)
CHLORIDE BLD-SCNC: 100 MMOL/L (ref 98–107)
CO2: 28 MMOL/L (ref 20–31)
CREAT SERPL-MCNC: 0.81 MG/DL (ref 0.7–1.2)
DIFFERENTIAL TYPE: ABNORMAL
EOSINOPHILS RELATIVE PERCENT: 1 % (ref 1–4)
GFR AFRICAN AMERICAN: >60 ML/MIN
GFR NON-AFRICAN AMERICAN: >60 ML/MIN
GFR SERPL CREATININE-BSD FRML MDRD: ABNORMAL ML/MIN/{1.73_M2}
GFR SERPL CREATININE-BSD FRML MDRD: ABNORMAL ML/MIN/{1.73_M2}
GLUCOSE BLD-MCNC: 102 MG/DL (ref 70–99)
HBA1C MFR BLD: 5.7 %
HCT VFR BLD CALC: 45.3 % (ref 40.7–50.3)
HEMOGLOBIN: 14.9 G/DL (ref 13–17)
IMMATURE GRANULOCYTES: 0 %
LYMPHOCYTES # BLD: 10 % (ref 24–43)
MCH RBC QN AUTO: 32.3 PG (ref 25.2–33.5)
MCHC RBC AUTO-ENTMCNC: 32.9 G/DL (ref 28.4–34.8)
MCV RBC AUTO: 98.1 FL (ref 82.6–102.9)
MONOCYTES # BLD: 9 % (ref 3–12)
NRBC AUTOMATED: 0 PER 100 WBC
PDW BLD-RTO: 16.7 % (ref 11.8–14.4)
PLATELET # BLD: 303 K/UL (ref 138–453)
PLATELET ESTIMATE: ABNORMAL
PMV BLD AUTO: 11.3 FL (ref 8.1–13.5)
POTASSIUM SERPL-SCNC: 4.2 MMOL/L (ref 3.7–5.3)
RBC # BLD: 4.62 M/UL (ref 4.21–5.77)
RBC # BLD: ABNORMAL 10*6/UL
SEG NEUTROPHILS: 79 % (ref 36–65)
SEGMENTED NEUTROPHILS ABSOLUTE COUNT: 7.69 K/UL (ref 1.5–8.1)
SODIUM BLD-SCNC: 139 MMOL/L (ref 135–144)
WBC # BLD: 9.7 K/UL (ref 3.5–11.3)
WBC # BLD: ABNORMAL 10*3/UL

## 2018-02-19 PROCEDURE — 99213 OFFICE O/P EST LOW 20 MIN: CPT | Performed by: INTERNAL MEDICINE

## 2018-02-19 PROCEDURE — G8484 FLU IMMUNIZE NO ADMIN: HCPCS | Performed by: INTERNAL MEDICINE

## 2018-02-19 PROCEDURE — 1036F TOBACCO NON-USER: CPT | Performed by: INTERNAL MEDICINE

## 2018-02-19 PROCEDURE — 3017F COLORECTAL CA SCREEN DOC REV: CPT | Performed by: INTERNAL MEDICINE

## 2018-02-19 PROCEDURE — 83036 HEMOGLOBIN GLYCOSYLATED A1C: CPT | Performed by: INTERNAL MEDICINE

## 2018-02-19 PROCEDURE — G8417 CALC BMI ABV UP PARAM F/U: HCPCS | Performed by: INTERNAL MEDICINE

## 2018-02-19 PROCEDURE — G8427 DOCREV CUR MEDS BY ELIG CLIN: HCPCS | Performed by: INTERNAL MEDICINE

## 2018-02-19 RX ORDER — SUCRALFATE 1 G/1
1 TABLET ORAL 3 TIMES DAILY
Qty: 30 TABLET | Refills: 3 | Status: SHIPPED | OUTPATIENT
Start: 2018-02-19 | End: 2018-03-20 | Stop reason: SDUPTHER

## 2018-02-19 RX ORDER — HYDROCODONE BITARTRATE AND ACETAMINOPHEN 5; 325 MG/1; MG/1
1 TABLET ORAL EVERY 6 HOURS PRN
Qty: 5 TABLET | Refills: 0 | Status: SHIPPED | OUTPATIENT
Start: 2018-02-19 | End: 2018-03-11

## 2018-02-22 ENCOUNTER — TELEPHONE (OUTPATIENT)
Dept: INTERNAL MEDICINE CLINIC | Age: 63
End: 2018-02-22

## 2018-02-22 NOTE — TELEPHONE ENCOUNTER
Patient called to let Dr Quentin Laurent know that the meds he prescribed are working, and that he feels about 80% better than he did. But he is completely out of the pain meds and wants to know if you would give him more. Looks like he was given #5 norco on 2/19/18. He uses [facility].

## 2018-03-20 ENCOUNTER — OFFICE VISIT (OUTPATIENT)
Dept: INTERNAL MEDICINE CLINIC | Age: 63
End: 2018-03-20
Payer: COMMERCIAL

## 2018-03-20 VITALS
BODY MASS INDEX: 32.86 KG/M2 | SYSTOLIC BLOOD PRESSURE: 124 MMHG | DIASTOLIC BLOOD PRESSURE: 62 MMHG | HEIGHT: 78 IN | WEIGHT: 284 LBS

## 2018-03-20 DIAGNOSIS — I10 ESSENTIAL HYPERTENSION: ICD-10-CM

## 2018-03-20 DIAGNOSIS — E03.9 HYPOTHYROIDISM, UNSPECIFIED TYPE: Primary | ICD-10-CM

## 2018-03-20 DIAGNOSIS — Z79.52 CURRENT CHRONIC USE OF SYSTEMIC STEROIDS: ICD-10-CM

## 2018-03-20 DIAGNOSIS — J84.10 POSTINFLAMMATORY PULMONARY FIBROSIS (HCC): ICD-10-CM

## 2018-03-20 DIAGNOSIS — R06.02 SOB (SHORTNESS OF BREATH): ICD-10-CM

## 2018-03-20 PROCEDURE — G8482 FLU IMMUNIZE ORDER/ADMIN: HCPCS | Performed by: INTERNAL MEDICINE

## 2018-03-20 PROCEDURE — 3017F COLORECTAL CA SCREEN DOC REV: CPT | Performed by: INTERNAL MEDICINE

## 2018-03-20 PROCEDURE — G8417 CALC BMI ABV UP PARAM F/U: HCPCS | Performed by: INTERNAL MEDICINE

## 2018-03-20 PROCEDURE — 99214 OFFICE O/P EST MOD 30 MIN: CPT | Performed by: INTERNAL MEDICINE

## 2018-03-20 PROCEDURE — 1036F TOBACCO NON-USER: CPT | Performed by: INTERNAL MEDICINE

## 2018-03-20 PROCEDURE — G8427 DOCREV CUR MEDS BY ELIG CLIN: HCPCS | Performed by: INTERNAL MEDICINE

## 2018-03-20 RX ORDER — SUCRALFATE 1 G/1
1 TABLET ORAL 3 TIMES DAILY
Qty: 270 TABLET | Refills: 3 | Status: SHIPPED | OUTPATIENT
Start: 2018-03-20 | End: 2019-03-25

## 2018-04-06 DIAGNOSIS — R60.9 EDEMA: ICD-10-CM

## 2018-04-06 RX ORDER — FUROSEMIDE 20 MG/1
TABLET ORAL
Qty: 90 TABLET | Refills: 3 | Status: SHIPPED | OUTPATIENT
Start: 2018-04-06 | End: 2020-01-01 | Stop reason: DRUGHIGH

## 2018-05-02 ENCOUNTER — OFFICE VISIT (OUTPATIENT)
Dept: INTERNAL MEDICINE CLINIC | Age: 63
End: 2018-05-02
Payer: COMMERCIAL

## 2018-05-02 VITALS
WEIGHT: 282 LBS | DIASTOLIC BLOOD PRESSURE: 72 MMHG | SYSTOLIC BLOOD PRESSURE: 118 MMHG | HEART RATE: 87 BPM | BODY MASS INDEX: 32.63 KG/M2 | HEIGHT: 78 IN

## 2018-05-02 DIAGNOSIS — R06.02 SOB (SHORTNESS OF BREATH): ICD-10-CM

## 2018-05-02 DIAGNOSIS — B02.9 HERPES ZOSTER WITHOUT COMPLICATION: Primary | ICD-10-CM

## 2018-05-02 DIAGNOSIS — I10 ESSENTIAL HYPERTENSION: ICD-10-CM

## 2018-05-02 PROCEDURE — G8427 DOCREV CUR MEDS BY ELIG CLIN: HCPCS | Performed by: INTERNAL MEDICINE

## 2018-05-02 PROCEDURE — 1036F TOBACCO NON-USER: CPT | Performed by: INTERNAL MEDICINE

## 2018-05-02 PROCEDURE — 99213 OFFICE O/P EST LOW 20 MIN: CPT | Performed by: INTERNAL MEDICINE

## 2018-05-02 PROCEDURE — 3017F COLORECTAL CA SCREEN DOC REV: CPT | Performed by: INTERNAL MEDICINE

## 2018-05-02 PROCEDURE — G8417 CALC BMI ABV UP PARAM F/U: HCPCS | Performed by: INTERNAL MEDICINE

## 2018-05-02 RX ORDER — OXYCODONE HYDROCHLORIDE AND ACETAMINOPHEN 5; 325 MG/1; MG/1
1 TABLET ORAL EVERY 6 HOURS PRN
Qty: 20 TABLET | Refills: 0 | Status: SHIPPED | OUTPATIENT
Start: 2018-05-02 | End: 2018-05-07

## 2018-05-02 RX ORDER — PANTOPRAZOLE SODIUM 20 MG/1
20 TABLET, DELAYED RELEASE ORAL 2 TIMES DAILY
Qty: 60 TABLET | Refills: 3 | Status: SHIPPED | OUTPATIENT
Start: 2018-05-02 | End: 2018-09-25 | Stop reason: SDUPTHER

## 2018-05-09 DIAGNOSIS — I15.9 SECONDARY HYPERTENSION: ICD-10-CM

## 2018-05-09 RX ORDER — AMLODIPINE BESYLATE 10 MG/1
TABLET ORAL
Qty: 90 TABLET | Refills: 3 | Status: SHIPPED | OUTPATIENT
Start: 2018-05-09 | End: 2019-05-15 | Stop reason: SDUPTHER

## 2018-05-26 ENCOUNTER — HOSPITAL ENCOUNTER (OUTPATIENT)
Dept: NON INVASIVE DIAGNOSTICS | Age: 63
Discharge: HOME | End: 2018-05-26
Payer: MEDICARE

## 2018-05-26 DIAGNOSIS — R06.02 SOB (SHORTNESS OF BREATH): ICD-10-CM

## 2018-05-26 LAB
LV EF: 48 %
LVEF MODALITY: NORMAL

## 2018-05-26 PROCEDURE — 93306 TTE W/DOPPLER COMPLETE: CPT

## 2018-05-29 DIAGNOSIS — I15.9 SECONDARY HYPERTENSION: ICD-10-CM

## 2018-05-30 RX ORDER — BENAZEPRIL HYDROCHLORIDE 40 MG/1
TABLET, FILM COATED ORAL
Qty: 90 TABLET | Refills: 3 | Status: SHIPPED | OUTPATIENT
Start: 2018-05-30 | End: 2019-05-15 | Stop reason: SDUPTHER

## 2018-07-30 DIAGNOSIS — E03.9 HYPOTHYROIDISM: ICD-10-CM

## 2018-07-31 RX ORDER — LEVOTHYROXINE SODIUM 0.03 MG/1
TABLET ORAL
Qty: 90 TABLET | Refills: 3 | Status: SHIPPED | OUTPATIENT
Start: 2018-07-31 | End: 2019-08-04 | Stop reason: SDUPTHER

## 2018-09-26 RX ORDER — PANTOPRAZOLE SODIUM 20 MG/1
TABLET, DELAYED RELEASE ORAL
Qty: 60 TABLET | Refills: 3 | Status: SHIPPED | OUTPATIENT
Start: 2018-09-26 | End: 2019-03-25 | Stop reason: SDUPTHER

## 2018-10-30 RX ORDER — IBUPROFEN 800 MG/1
TABLET ORAL
Qty: 60 TABLET | Refills: 2 | Status: SHIPPED | OUTPATIENT
Start: 2018-10-30 | End: 2019-09-07 | Stop reason: SDUPTHER

## 2019-03-25 ENCOUNTER — OFFICE VISIT (OUTPATIENT)
Dept: INTERNAL MEDICINE CLINIC | Age: 64
End: 2019-03-25
Payer: COMMERCIAL

## 2019-03-25 VITALS
SYSTOLIC BLOOD PRESSURE: 134 MMHG | HEIGHT: 78 IN | BODY MASS INDEX: 33.67 KG/M2 | WEIGHT: 291 LBS | DIASTOLIC BLOOD PRESSURE: 80 MMHG

## 2019-03-25 DIAGNOSIS — F41.9 ANXIETY: ICD-10-CM

## 2019-03-25 DIAGNOSIS — I10 ESSENTIAL HYPERTENSION: Primary | ICD-10-CM

## 2019-03-25 DIAGNOSIS — J84.10 POSTINFLAMMATORY PULMONARY FIBROSIS (HCC): ICD-10-CM

## 2019-03-25 DIAGNOSIS — J47.9 BRONCHIECTASIS WITHOUT COMPLICATION (HCC): ICD-10-CM

## 2019-03-25 DIAGNOSIS — F33.42 MAJOR DEPRESSIVE DISORDER, RECURRENT, IN FULL REMISSION (HCC): ICD-10-CM

## 2019-03-25 PROCEDURE — 3017F COLORECTAL CA SCREEN DOC REV: CPT | Performed by: INTERNAL MEDICINE

## 2019-03-25 PROCEDURE — G8417 CALC BMI ABV UP PARAM F/U: HCPCS | Performed by: INTERNAL MEDICINE

## 2019-03-25 PROCEDURE — 99213 OFFICE O/P EST LOW 20 MIN: CPT | Performed by: INTERNAL MEDICINE

## 2019-03-25 PROCEDURE — G8427 DOCREV CUR MEDS BY ELIG CLIN: HCPCS | Performed by: INTERNAL MEDICINE

## 2019-03-25 PROCEDURE — G8484 FLU IMMUNIZE NO ADMIN: HCPCS | Performed by: INTERNAL MEDICINE

## 2019-03-25 PROCEDURE — 1036F TOBACCO NON-USER: CPT | Performed by: INTERNAL MEDICINE

## 2019-03-25 RX ORDER — BUSPIRONE HYDROCHLORIDE 5 MG/1
5 TABLET ORAL 3 TIMES DAILY
Qty: 90 TABLET | Refills: 0 | Status: SHIPPED | OUTPATIENT
Start: 2019-03-25 | End: 2019-05-02 | Stop reason: SDUPTHER

## 2019-03-25 RX ORDER — ALBUTEROL SULFATE 2.5 MG/3ML
2.5 SOLUTION RESPIRATORY (INHALATION) EVERY 6 HOURS PRN
Refills: 5 | COMMUNITY
Start: 2019-03-15 | End: 2020-01-01 | Stop reason: SDUPTHER

## 2019-03-25 RX ORDER — BUDESONIDE AND FORMOTEROL FUMARATE DIHYDRATE 160; 4.5 UG/1; UG/1
AEROSOL RESPIRATORY (INHALATION)
Refills: 5 | COMMUNITY
Start: 2019-03-14 | End: 2019-03-25

## 2019-03-25 RX ORDER — PANTOPRAZOLE SODIUM 20 MG/1
40 TABLET, DELAYED RELEASE ORAL DAILY
Qty: 30 TABLET | Refills: 3 | Status: SHIPPED | OUTPATIENT
Start: 2019-03-25 | End: 2020-03-17 | Stop reason: SDUPTHER

## 2019-03-25 RX ORDER — LORAZEPAM 0.5 MG/1
0.5 TABLET ORAL EVERY 8 HOURS PRN
Qty: 15 TABLET | Refills: 0 | Status: SHIPPED | OUTPATIENT
Start: 2019-03-25 | End: 2019-03-30

## 2019-03-25 RX ORDER — BUDESONIDE AND FORMOTEROL FUMARATE DIHYDRATE 160; 4.5 UG/1; UG/1
2 AEROSOL RESPIRATORY (INHALATION) 2 TIMES DAILY
COMMUNITY
End: 2020-01-01

## 2019-03-25 ASSESSMENT — ENCOUNTER SYMPTOMS
ABDOMINAL DISTENTION: 0
WHEEZING: 0
CONSTIPATION: 0
CHEST TIGHTNESS: 0
COLOR CHANGE: 0
APNEA: 0
SHORTNESS OF BREATH: 0
ABDOMINAL PAIN: 1
BACK PAIN: 0
COUGH: 0
FACIAL SWELLING: 0
DIARRHEA: 0

## 2019-03-25 NOTE — PROGRESS NOTES
Subjective:      Patient ID: Dary Arce is a 59 y.o. male. Chronic Disease Visit Information    BP Readings from Last 3 Encounters:   05/02/18 118/72   03/20/18 124/62   02/19/18 134/82          Hemoglobin A1C (%)   Date Value   02/19/2018 5.7   02/19/2015 5.5     LDL Cholesterol (mg/dL)   Date Value   11/03/2016 83     HDL (mg/dL)   Date Value   11/03/2016 83     BUN (mg/dL)   Date Value   02/19/2018 14     CREATININE (mg/dL)   Date Value   02/19/2018 0.81     Glucose (mg/dL)   Date Value   02/19/2018 102 (H)   12/28/2011 157 (H)            Have you changed or started any medications since your last visit including any over-the-counter medicines, vitamins, or herbal medicines? no   Are you having any side effects from any of your medications? -  no  Have you stopped taking any of your medications? Is so, why? -  no    Have you seen any other physician or provider since your last visit? Yes - Records Obtained  Have you had any other diagnostic tests since your last visit? No  Have you been seen in the emergency room and/or had an admission to a hospital since we last saw you? No  Have you had your annual diabetic retinal (eye) exam? No  Have you had your routine dental cleaning in the past 6 months? no    Have you activated your [employer] account? If not, what are your barriers?  Yes     Patient Care Team:  Minus MD Brianna as PCP - General  Minus MD Brianna as PCP - [facility] Attributed Provider         Medical History Review  Past Medical, Family, and Social History reviewed and does contribute to the patient presenting condition  Chief Complaint   Patient presents with    COPD     02 continuous - he is very sob     Anxiety     he is very anxious      Health Maintenance   Topic Date Due    Hepatitis C screen  1955    HIV screen  07/05/1971    Shingles Vaccine (1 of 2) 07/05/2006    TSH testing  11/03/2017    Flu vaccine (1) 09/01/2018    A1C test (Diabetic or Prediabetic)  02/19/2019    scleral icterus. Neck: Normal range of motion. Neck supple. No JVD present. No tracheal deviation present. No thyromegaly present. Cardiovascular: Normal rate and normal heart sounds. Exam reveals no gallop. No murmur heard. Pulmonary/Chest: Effort normal. No stridor. No respiratory distress. He has no wheezes. He has rales (velcro rales present ). On oxygen    Abdominal: Soft. Bowel sounds are normal. He exhibits no distension. There is no tenderness. There is no rebound and no guarding. Musculoskeletal: Normal range of motion. He exhibits no edema or tenderness. Neurological: He is alert and oriented to person, place, and time. Skin: Skin is warm and dry. No rash noted. He is not diaphoretic. No erythema. Psychiatric:   Very agitated , Hyperventilating , Anxious    Nursing note and vitals reviewed. Assessment / Plan:   1. Essential hypertension  Controlled    2. Postinflammatory pulmonary fibrosis (HCC)  On oxygen, Symbicort, Proventil as needed, prednisone    3. Anxiety  - LORazepam (ATIVAN) 0.5 MG tablet; Take 1 tablet by mouth every 8 hours as needed for Anxiety for up to 5 days. Dispense: 15 tablet; Refill: 0  Started on BuSpar  Explained patient that we are not going to prescribe Ativan for long term     Return in about 3 months (around 6/25/2019). · Reviewed prior labs and health maintenance. · Discussed use, benefit, and side effects of prescribed medications. Barriers to medication compliance addressed. All patient questions answered. Pt voiced understanding. MD [facility]  4/9/2019, 12:45 PM    Please note that this chart was generated using voice recognition Dragon dictation software. Although every effort was made to ensure the accuracy of this automated transcription, some errors in transcription may have occurred.

## 2019-05-08 RX ORDER — BUSPIRONE HYDROCHLORIDE 5 MG/1
TABLET ORAL
Qty: 90 TABLET | Refills: 3 | Status: SHIPPED | OUTPATIENT
Start: 2019-05-08 | End: 2020-01-01

## 2019-05-15 DIAGNOSIS — I15.9 SECONDARY HYPERTENSION: ICD-10-CM

## 2019-05-15 RX ORDER — BENAZEPRIL HYDROCHLORIDE 40 MG/1
TABLET, FILM COATED ORAL
Qty: 90 TABLET | Refills: 3 | Status: SHIPPED | OUTPATIENT
Start: 2019-05-15 | End: 2020-06-12 | Stop reason: SDUPTHER

## 2019-05-15 RX ORDER — AMLODIPINE BESYLATE 10 MG/1
TABLET ORAL
Qty: 30 TABLET | Refills: 11 | Status: SHIPPED | OUTPATIENT
Start: 2019-05-15 | End: 2020-06-12 | Stop reason: SDUPTHER

## 2019-05-21 RX ORDER — SUCRALFATE 1 G/1
TABLET ORAL
Qty: 90 TABLET | Refills: 11 | Status: SHIPPED | OUTPATIENT
Start: 2019-05-21 | End: 2020-06-16 | Stop reason: SDUPTHER

## 2019-08-04 DIAGNOSIS — E03.9 HYPOTHYROIDISM: ICD-10-CM

## 2019-08-05 RX ORDER — LEVOTHYROXINE SODIUM 0.03 MG/1
TABLET ORAL
Qty: 90 TABLET | Refills: 3 | Status: SHIPPED | OUTPATIENT
Start: 2019-08-05 | End: 2020-01-01 | Stop reason: SDUPTHER

## 2019-09-04 RX ORDER — PANTOPRAZOLE SODIUM 20 MG/1
TABLET, DELAYED RELEASE ORAL
Qty: 60 TABLET | Refills: 3 | Status: SHIPPED | OUTPATIENT
Start: 2019-09-04 | End: 2019-11-04

## 2019-09-10 RX ORDER — IBUPROFEN 800 MG/1
TABLET ORAL
Qty: 60 TABLET | Refills: 2 | Status: SHIPPED | OUTPATIENT
Start: 2019-09-10 | End: 2020-03-17 | Stop reason: SDUPTHER

## 2019-10-23 ENCOUNTER — OFFICE VISIT (OUTPATIENT)
Dept: INTERNAL MEDICINE CLINIC | Age: 64
End: 2019-10-23
Payer: COMMERCIAL

## 2019-10-23 VITALS
WEIGHT: 286 LBS | SYSTOLIC BLOOD PRESSURE: 102 MMHG | DIASTOLIC BLOOD PRESSURE: 70 MMHG | HEIGHT: 78 IN | TEMPERATURE: 98.3 F | BODY MASS INDEX: 33.09 KG/M2 | HEART RATE: 74 BPM | OXYGEN SATURATION: 90 %

## 2019-10-23 DIAGNOSIS — L03.115 CELLULITIS OF RIGHT LOWER EXTREMITY: ICD-10-CM
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
## 2019-10-23 PROCEDURE — G8484 FLU IMMUNIZE NO ADMIN: HCPCS | Performed by: PHYSICIAN ASSISTANT

## 2019-10-23 PROCEDURE — 99213 OFFICE O/P EST LOW 20 MIN: CPT | Performed by: PHYSICIAN ASSISTANT

## 2019-10-23 PROCEDURE — 1036F TOBACCO NON-USER: CPT | Performed by: PHYSICIAN ASSISTANT

## 2019-10-23 PROCEDURE — 3017F COLORECTAL CA SCREEN DOC REV: CPT | Performed by: PHYSICIAN ASSISTANT

## 2019-10-23 PROCEDURE — G8427 DOCREV CUR MEDS BY ELIG CLIN: HCPCS | Performed by: PHYSICIAN ASSISTANT

## 2019-10-23 PROCEDURE — G8417 CALC BMI ABV UP PARAM F/U: HCPCS | Performed by: PHYSICIAN ASSISTANT

## 2019-10-23 RX ORDER — AMOXICILLIN AND CLAVULANATE POTASSIUM 875; 125 MG/1; MG/1
1 TABLET, FILM COATED ORAL 2 TIMES DAILY
Qty: 14 TABLET | Refills: 0 | Status: CANCELLED | OUTPATIENT
Start: 2019-10-23 | End: 2019-10-30

## 2019-10-23 RX ORDER — DOXYCYCLINE HYCLATE 100 MG
100 TABLET ORAL 2 TIMES DAILY
Qty: 14 TABLET | Refills: 0 | Status: SHIPPED | OUTPATIENT
Start: 2019-10-23 | End: 2019-10-28 | Stop reason: SDUPTHER

## 2019-10-23 ASSESSMENT — ENCOUNTER SYMPTOMS
ABDOMINAL PAIN: 0
WHEEZING: 0
CHEST TIGHTNESS: 0
SHORTNESS OF BREATH: 1
COUGH: 1
NAUSEA: 0

## 2019-10-28 ENCOUNTER — OFFICE VISIT (OUTPATIENT)
Dept: INTERNAL MEDICINE CLINIC | Age: 64
End: 2019-10-28
Payer: COMMERCIAL

## 2019-10-28 VITALS
DIASTOLIC BLOOD PRESSURE: 72 MMHG | BODY MASS INDEX: 32.74 KG/M2 | WEIGHT: 283 LBS | SYSTOLIC BLOOD PRESSURE: 118 MMHG | OXYGEN SATURATION: 91 % | HEART RATE: 69 BPM | HEIGHT: 78 IN

## 2019-10-28 DIAGNOSIS — Z00.00 HEALTHCARE MAINTENANCE: Primary | ICD-10-CM

## 2019-10-28 DIAGNOSIS — L03.115 CELLULITIS OF RIGHT LOWER EXTREMITY: ICD-10-CM
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
## 2019-10-28 PROCEDURE — G8427 DOCREV CUR MEDS BY ELIG CLIN: HCPCS | Performed by: PHYSICIAN ASSISTANT

## 2019-10-28 PROCEDURE — G8417 CALC BMI ABV UP PARAM F/U: HCPCS | Performed by: PHYSICIAN ASSISTANT

## 2019-10-28 PROCEDURE — 1036F TOBACCO NON-USER: CPT | Performed by: PHYSICIAN ASSISTANT

## 2019-10-28 PROCEDURE — 99213 OFFICE O/P EST LOW 20 MIN: CPT | Performed by: PHYSICIAN ASSISTANT

## 2019-10-28 PROCEDURE — 3017F COLORECTAL CA SCREEN DOC REV: CPT | Performed by: PHYSICIAN ASSISTANT

## 2019-10-28 PROCEDURE — G8484 FLU IMMUNIZE NO ADMIN: HCPCS | Performed by: PHYSICIAN ASSISTANT

## 2019-10-28 RX ORDER — DOXYCYCLINE HYCLATE 100 MG
100 TABLET ORAL 2 TIMES DAILY
Qty: 10 TABLET | Refills: 0 | Status: SHIPPED | OUTPATIENT
Start: 2019-10-28 | End: 2019-11-02

## 2019-10-28 ASSESSMENT — ENCOUNTER SYMPTOMS
SHORTNESS OF BREATH: 1
NAUSEA: 0
ABDOMINAL PAIN: 0
CHEST TIGHTNESS: 0
COUGH: 1
WHEEZING: 0

## 2019-10-31 ENCOUNTER — HOSPITAL ENCOUNTER (OUTPATIENT)
Age: 64
Setting detail: SPECIMEN
Discharge: HOME | End: 2019-10-31
Payer: COMMERCIAL

## 2019-10-31 ENCOUNTER — TELEPHONE (OUTPATIENT)
Dept: INTERNAL MEDICINE CLINIC | Age: 64
End: 2019-10-31

## 2019-10-31 DIAGNOSIS — Z00.00 HEALTHCARE MAINTENANCE: ICD-10-CM

## 2019-10-31 DIAGNOSIS — Z12.5 ENCOUNTER FOR PROSTATE CANCER SCREENING: Primary | ICD-10-CM

## 2019-10-31 DIAGNOSIS — Z12.5 ENCOUNTER FOR PROSTATE CANCER SCREENING: ICD-10-CM

## 2019-10-31 LAB
-: NORMAL
ABSOLUTE EOS #: 0 K/UL (ref 0–0.4)
ABSOLUTE IMMATURE GRANULOCYTE: 0.11 K/UL (ref 0–0.3)
ABSOLUTE LYMPH #: 0.32 K/UL (ref 1–4.8)
ABSOLUTE MONO #: 1.26 K/UL (ref 0.1–0.8)
ALBUMIN SERPL-MCNC: 3.4 G/DL (ref 3.5–5.2)
ALBUMIN/GLOBULIN RATIO: 0.9 (ref 1–2.5)
ALP BLD-CCNC: 65 U/L (ref 40–129)
ALT SERPL-CCNC: 9 U/L (ref 5–41)
AMORPHOUS: NORMAL
ANION GAP SERPL CALCULATED.3IONS-SCNC: 14 MMOL/L (ref 9–17)
AST SERPL-CCNC: 14 U/L
BACTERIA: NORMAL
BASOPHILS # BLD: 0 % (ref 0–2)
BASOPHILS ABSOLUTE: 0 K/UL (ref 0–0.2)
BILIRUB SERPL-MCNC: 0.35 MG/DL (ref 0.3–1.2)
BILIRUBIN URINE: ABNORMAL
BUN BLDV-MCNC: 16 MG/DL (ref 8–23)
BUN/CREAT BLD: ABNORMAL (ref 9–20)
CALCIUM SERPL-MCNC: 9 MG/DL (ref 8.6–10.4)
CASTS UA: NORMAL /LPF (ref 0–8)
CHLORIDE BLD-SCNC: 101 MMOL/L (ref 98–107)
CHOLESTEROL/HDL RATIO: 2.7
CHOLESTEROL: 175 MG/DL
CO2: 30 MMOL/L (ref 20–31)
COLOR: ABNORMAL
COMMENT UA: ABNORMAL
CREAT SERPL-MCNC: 0.91 MG/DL (ref 0.7–1.2)
CRYSTALS, UA: NORMAL /HPF
DIFFERENTIAL TYPE: ABNORMAL
EOSINOPHILS RELATIVE PERCENT: 0 % (ref 1–4)
EPITHELIAL CELLS UA: NORMAL /HPF (ref 0–5)
ESTIMATED AVERAGE GLUCOSE: 120 MG/DL
GFR AFRICAN AMERICAN: >60 ML/MIN
GFR NON-AFRICAN AMERICAN: >60 ML/MIN
GFR SERPL CREATININE-BSD FRML MDRD: ABNORMAL ML/MIN/{1.73_M2}
GFR SERPL CREATININE-BSD FRML MDRD: ABNORMAL ML/MIN/{1.73_M2}
GLUCOSE BLD-MCNC: 112 MG/DL (ref 70–99)
GLUCOSE URINE: NEGATIVE
HBA1C MFR BLD: 5.8 % (ref 4–6)
HCT VFR BLD CALC: 48.7 % (ref 40.7–50.3)
HDLC SERPL-MCNC: 66 MG/DL
HEMOGLOBIN: 15.6 G/DL (ref 13–17)
IMMATURE GRANULOCYTES: 1 %
KETONES, URINE: ABNORMAL
LDL CHOLESTEROL: 94 MG/DL (ref 0–130)
LEUKOCYTE ESTERASE, URINE: ABNORMAL
LYMPHOCYTES # BLD: 3 % (ref 24–44)
MCH RBC QN AUTO: 31.6 PG (ref 25.2–33.5)
MCHC RBC AUTO-ENTMCNC: 32 G/DL (ref 28.4–34.8)
MCV RBC AUTO: 98.8 FL (ref 82.6–102.9)
MONOCYTES # BLD: 12 % (ref 1–7)
MORPHOLOGY: ABNORMAL
MUCUS: NORMAL
NITRITE, URINE: NEGATIVE
NRBC AUTOMATED: 0 PER 100 WBC
OTHER OBSERVATIONS UA: NORMAL
PDW BLD-RTO: 15.9 % (ref 11.8–14.4)
PH UA: 6 (ref 5–8)
PLATELET # BLD: 302 K/UL (ref 138–453)
PLATELET ESTIMATE: ABNORMAL
PMV BLD AUTO: 11 FL (ref 8.1–13.5)
POTASSIUM SERPL-SCNC: 4.4 MMOL/L (ref 3.7–5.3)
PROSTATE SPECIFIC ANTIGEN: 0.52 UG/L
PROTEIN UA: ABNORMAL
RBC # BLD: 4.93 M/UL (ref 4.21–5.77)
RBC # BLD: ABNORMAL 10*6/UL
RBC UA: NORMAL /HPF (ref 0–4)
RENAL EPITHELIAL, UA: NORMAL /HPF
SEG NEUTROPHILS: 84 % (ref 36–66)
SEGMENTED NEUTROPHILS ABSOLUTE COUNT: 8.81 K/UL (ref 1.8–7.7)
SODIUM BLD-SCNC: 145 MMOL/L (ref 135–144)
SPECIFIC GRAVITY UA: 1.02 (ref 1–1.03)
TOTAL PROTEIN: 7.3 G/DL (ref 6.4–8.3)
TRICHOMONAS: NORMAL
TRIGL SERPL-MCNC: 73 MG/DL
TURBIDITY: CLEAR
URINE HGB: NEGATIVE
UROBILINOGEN, URINE: NORMAL
VLDLC SERPL CALC-MCNC: NORMAL MG/DL (ref 1–30)
WBC # BLD: 10.5 K/UL (ref 3.5–11.3)
WBC # BLD: ABNORMAL 10*3/UL
WBC UA: NORMAL /HPF (ref 0–5)
YEAST: NORMAL

## 2019-11-04 ENCOUNTER — OFFICE VISIT (OUTPATIENT)
Dept: INTERNAL MEDICINE CLINIC | Age: 64
End: 2019-11-04
Payer: COMMERCIAL

## 2019-11-04 VITALS
DIASTOLIC BLOOD PRESSURE: 74 MMHG | WEIGHT: 282 LBS | SYSTOLIC BLOOD PRESSURE: 122 MMHG | OXYGEN SATURATION: 93 % | HEIGHT: 78 IN | BODY MASS INDEX: 32.63 KG/M2 | HEART RATE: 106 BPM

## 2019-11-04 DIAGNOSIS — L03.115 CELLULITIS OF RIGHT LOWER EXTREMITY: ICD-10-CM
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
## 2019-11-04 PROBLEM — J64 PNEUMOCONIOSIS (HCC): Status: ACTIVE | Noted: 2019-11-04

## 2019-11-04 PROBLEM — J62.8: Status: ACTIVE | Noted: 2019-11-04

## 2019-11-04 PROCEDURE — 1036F TOBACCO NON-USER: CPT | Performed by: PHYSICIAN ASSISTANT

## 2019-11-04 PROCEDURE — 99213 OFFICE O/P EST LOW 20 MIN: CPT | Performed by: PHYSICIAN ASSISTANT

## 2019-11-04 PROCEDURE — G8484 FLU IMMUNIZE NO ADMIN: HCPCS | Performed by: PHYSICIAN ASSISTANT

## 2019-11-04 PROCEDURE — G8417 CALC BMI ABV UP PARAM F/U: HCPCS | Performed by: PHYSICIAN ASSISTANT

## 2019-11-04 PROCEDURE — G8427 DOCREV CUR MEDS BY ELIG CLIN: HCPCS | Performed by: PHYSICIAN ASSISTANT

## 2019-11-04 PROCEDURE — 3017F COLORECTAL CA SCREEN DOC REV: CPT | Performed by: PHYSICIAN ASSISTANT

## 2019-11-04 ASSESSMENT — ENCOUNTER SYMPTOMS
WHEEZING: 0
ABDOMINAL PAIN: 0
NAUSEA: 0
SHORTNESS OF BREATH: 1
COUGH: 0
CHEST TIGHTNESS: 0

## 2019-11-22 ENCOUNTER — TELEPHONE (OUTPATIENT)
Dept: INTERNAL MEDICINE CLINIC | Age: 64
End: 2019-11-22

## 2019-11-22 RX ORDER — CEPHALEXIN 500 MG/1
500 CAPSULE ORAL 4 TIMES DAILY
Qty: 28 CAPSULE | Refills: 0 | Status: SHIPPED | OUTPATIENT
Start: 2019-11-22 | End: 2019-11-29

## 2020-01-01 ENCOUNTER — TELEPHONE (OUTPATIENT)
Dept: WOUND CARE | Age: 65
End: 2020-01-01

## 2020-01-01 ENCOUNTER — HOSPITAL ENCOUNTER (OUTPATIENT)
Dept: WOUND CARE | Age: 65
Discharge: HOME | End: 2020-10-05
Payer: COMMERCIAL

## 2020-01-01 ENCOUNTER — HOSPITAL ENCOUNTER (OUTPATIENT)
Dept: WOUND CARE | Age: 65
Discharge: HOME | End: 2020-09-10
Payer: COMMERCIAL

## 2020-01-01 ENCOUNTER — TELEPHONE (OUTPATIENT)
Dept: INTERNAL MEDICINE CLINIC | Age: 65
End: 2020-01-01

## 2020-01-01 ENCOUNTER — APPOINTMENT (OUTPATIENT)
Dept: GENERAL RADIOLOGY | Age: 65
DRG: 308 | End: 2020-01-01
Payer: COMMERCIAL

## 2020-01-01 ENCOUNTER — OFFICE VISIT (OUTPATIENT)
Dept: INTERNAL MEDICINE CLINIC | Age: 65
End: 2020-01-01
Payer: COMMERCIAL

## 2020-01-01 ENCOUNTER — HOSPITAL ENCOUNTER (INPATIENT)
Age: 65
LOS: 8 days | Discharge: HOME HEALTH | DRG: 308 | End: 2020-08-11
Attending: EMERGENCY MEDICINE | Admitting: INTERNAL MEDICINE
Payer: COMMERCIAL

## 2020-01-01 ENCOUNTER — OFFICE VISIT (OUTPATIENT)
Dept: DERMATOLOGY | Age: 65
End: 2020-01-01
Payer: COMMERCIAL

## 2020-01-01 ENCOUNTER — HOSPITAL ENCOUNTER (OUTPATIENT)
Dept: WOUND CARE | Age: 65
Discharge: HOME | End: 2020-09-03
Payer: COMMERCIAL

## 2020-01-01 ENCOUNTER — HOSPITAL ENCOUNTER (OUTPATIENT)
Dept: WOUND CARE | Age: 65
Discharge: HOME | End: 2020-09-24

## 2020-01-01 ENCOUNTER — HOSPITAL ENCOUNTER (EMERGENCY)
Age: 65
Discharge: HOME | End: 2020-07-31
Attending: EMERGENCY MEDICINE
Payer: COMMERCIAL

## 2020-01-01 ENCOUNTER — HOSPITAL ENCOUNTER (OUTPATIENT)
Age: 65
Setting detail: SPECIMEN
Discharge: HOME | End: 2020-12-16
Payer: COMMERCIAL

## 2020-01-01 ENCOUNTER — TELEPHONE (OUTPATIENT)
Dept: SURGERY | Age: 65
End: 2020-01-01

## 2020-01-01 ENCOUNTER — HOSPITAL ENCOUNTER (OUTPATIENT)
Dept: WOUND CARE | Age: 65
Discharge: HOME | End: 2020-09-28

## 2020-01-01 VITALS
HEIGHT: 78 IN | WEIGHT: 262.79 LBS | HEART RATE: 84 BPM | TEMPERATURE: 97.5 F | SYSTOLIC BLOOD PRESSURE: 106 MMHG | BODY MASS INDEX: 30.41 KG/M2 | RESPIRATION RATE: 20 BRPM | DIASTOLIC BLOOD PRESSURE: 57 MMHG | OXYGEN SATURATION: 94 %

## 2020-01-01 VITALS
SYSTOLIC BLOOD PRESSURE: 121 MMHG | DIASTOLIC BLOOD PRESSURE: 75 MMHG | BODY MASS INDEX: 30.31 KG/M2 | WEIGHT: 262 LBS | HEIGHT: 78 IN | TEMPERATURE: 97.5 F | HEART RATE: 85 BPM | RESPIRATION RATE: 20 BRPM

## 2020-01-01 VITALS
WEIGHT: 262 LBS | DIASTOLIC BLOOD PRESSURE: 67 MMHG | SYSTOLIC BLOOD PRESSURE: 107 MMHG | TEMPERATURE: 97.1 F | RESPIRATION RATE: 22 BRPM | HEART RATE: 96 BPM | BODY MASS INDEX: 30.31 KG/M2 | HEIGHT: 78 IN

## 2020-01-01 VITALS
RESPIRATION RATE: 20 BRPM | SYSTOLIC BLOOD PRESSURE: 118 MMHG | WEIGHT: 262 LBS | BODY MASS INDEX: 30.31 KG/M2 | HEART RATE: 80 BPM | HEIGHT: 78 IN | DIASTOLIC BLOOD PRESSURE: 70 MMHG | TEMPERATURE: 96.3 F

## 2020-01-01 VITALS
TEMPERATURE: 98.4 F | RESPIRATION RATE: 16 BRPM | WEIGHT: 280 LBS | OXYGEN SATURATION: 97 % | BODY MASS INDEX: 32.4 KG/M2 | HEART RATE: 86 BPM | SYSTOLIC BLOOD PRESSURE: 141 MMHG | DIASTOLIC BLOOD PRESSURE: 85 MMHG | HEIGHT: 78 IN

## 2020-01-01 VITALS
DIASTOLIC BLOOD PRESSURE: 58 MMHG | HEART RATE: 88 BPM | HEIGHT: 78 IN | TEMPERATURE: 97.5 F | SYSTOLIC BLOOD PRESSURE: 100 MMHG | OXYGEN SATURATION: 84 % | WEIGHT: 259 LBS | BODY MASS INDEX: 29.97 KG/M2

## 2020-01-01 VITALS
HEIGHT: 78 IN | BODY MASS INDEX: 30.36 KG/M2 | HEART RATE: 85 BPM | DIASTOLIC BLOOD PRESSURE: 67 MMHG | OXYGEN SATURATION: 85 % | WEIGHT: 262.4 LBS | TEMPERATURE: 98.2 F | SYSTOLIC BLOOD PRESSURE: 102 MMHG

## 2020-01-01 VITALS
TEMPERATURE: 97.3 F | SYSTOLIC BLOOD PRESSURE: 116 MMHG | HEIGHT: 78 IN | WEIGHT: 255 LBS | DIASTOLIC BLOOD PRESSURE: 76 MMHG | BODY MASS INDEX: 29.5 KG/M2 | RESPIRATION RATE: 14 BRPM | HEART RATE: 72 BPM

## 2020-01-01 LAB
ABSOLUTE BANDS #: 0.1 K/UL (ref 0–1)
ABSOLUTE BANDS #: 0.31 K/UL (ref 0–1)
ABSOLUTE EOS #: 0 K/UL (ref 0–0.4)
ABSOLUTE EOS #: 0.1 K/UL (ref 0–0.4)
ABSOLUTE EOS #: 0.6 K/UL (ref 0–0.4)
ABSOLUTE IMMATURE GRANULOCYTE: ABNORMAL K/UL (ref 0–0.3)
ABSOLUTE LYMPH #: 0.31 K/UL (ref 1–4.8)
ABSOLUTE LYMPH #: 0.6 K/UL (ref 1–4.8)
ABSOLUTE LYMPH #: 1.1 K/UL (ref 1–4.8)
ABSOLUTE MONO #: 0.3 K/UL (ref 0.1–1.3)
ABSOLUTE MONO #: 0.71 K/UL (ref 0.1–1.3)
ABSOLUTE MONO #: 1 K/UL (ref 0.1–1.3)
ALBUMIN SERPL-MCNC: 3.1 G/DL (ref 3.5–5.2)
ALBUMIN SERPL-MCNC: 3.3 G/DL (ref 3.5–5.2)
ALBUMIN/GLOBULIN RATIO: 0.9 (ref 1–2.5)
ALBUMIN/GLOBULIN RATIO: ABNORMAL (ref 1–2.5)
ALLEN TEST: ABNORMAL
ALP BLD-CCNC: 61 U/L (ref 40–129)
ALP BLD-CCNC: 67 U/L (ref 40–129)
ALT SERPL-CCNC: 13 U/L (ref 5–41)
ALT SERPL-CCNC: 15 U/L (ref 5–41)
ANION GAP SERPL CALCULATED.3IONS-SCNC: 15 MMOL/L (ref 9–17)
ANION GAP SERPL CALCULATED.3IONS-SCNC: 5 MMOL/L (ref 9–17)
ANION GAP SERPL CALCULATED.3IONS-SCNC: 6 MMOL/L (ref 9–17)
ANION GAP SERPL CALCULATED.3IONS-SCNC: 8 MMOL/L (ref 9–17)
ANION GAP SERPL CALCULATED.3IONS-SCNC: 9 MMOL/L (ref 9–17)
AST SERPL-CCNC: 16 U/L
AST SERPL-CCNC: 19 U/L
BANDS: 1 % (ref 0–10)
BANDS: 3 % (ref 0–10)
BASOPHILS # BLD: 0 % (ref 0–2)
BASOPHILS # BLD: 1 % (ref 0–2)
BASOPHILS # BLD: 1 % (ref 0–2)
BASOPHILS ABSOLUTE: 0 K/UL (ref 0–0.2)
BASOPHILS ABSOLUTE: 0.1 K/UL (ref 0–0.2)
BASOPHILS ABSOLUTE: 0.1 K/UL (ref 0–0.2)
BILIRUB SERPL-MCNC: 0.26 MG/DL (ref 0.3–1.2)
BILIRUB SERPL-MCNC: 0.56 MG/DL (ref 0.3–1.2)
BNP INTERPRETATION: ABNORMAL
BUN BLDV-MCNC: 10 MG/DL (ref 8–23)
BUN BLDV-MCNC: 10 MG/DL (ref 8–23)
BUN BLDV-MCNC: 11 MG/DL (ref 8–23)
BUN BLDV-MCNC: 11 MG/DL (ref 8–23)
BUN BLDV-MCNC: 13 MG/DL (ref 8–23)
BUN BLDV-MCNC: 15 MG/DL (ref 8–23)
BUN BLDV-MCNC: 21 MG/DL (ref 8–23)
BUN BLDV-MCNC: 22 MG/DL (ref 8–23)
BUN BLDV-MCNC: 23 MG/DL (ref 8–23)
BUN BLDV-MCNC: 26 MG/DL (ref 8–23)
BUN/CREAT BLD: ABNORMAL (ref 9–20)
CALCIUM SERPL-MCNC: 8.4 MG/DL (ref 8.6–10.4)
CALCIUM SERPL-MCNC: 8.5 MG/DL (ref 8.6–10.4)
CALCIUM SERPL-MCNC: 8.7 MG/DL (ref 8.6–10.4)
CALCIUM SERPL-MCNC: 8.7 MG/DL (ref 8.6–10.4)
CALCIUM SERPL-MCNC: 8.8 MG/DL (ref 8.6–10.4)
CALCIUM SERPL-MCNC: 8.8 MG/DL (ref 8.6–10.4)
CALCIUM SERPL-MCNC: 8.9 MG/DL (ref 8.6–10.4)
CALCIUM SERPL-MCNC: 8.9 MG/DL (ref 8.6–10.4)
CALCIUM SERPL-MCNC: 9 MG/DL (ref 8.6–10.4)
CALCIUM SERPL-MCNC: 9.1 MG/DL (ref 8.6–10.4)
CARBOXYHEMOGLOBIN: 1.3 % (ref 0–5)
CHLORIDE BLD-SCNC: 100 MMOL/L (ref 98–107)
CHLORIDE BLD-SCNC: 100 MMOL/L (ref 98–107)
CHLORIDE BLD-SCNC: 101 MMOL/L (ref 98–107)
CHLORIDE BLD-SCNC: 101 MMOL/L (ref 98–107)
CHLORIDE BLD-SCNC: 102 MMOL/L (ref 98–107)
CHLORIDE BLD-SCNC: 102 MMOL/L (ref 98–107)
CHLORIDE BLD-SCNC: 98 MMOL/L (ref 98–107)
CHLORIDE BLD-SCNC: 99 MMOL/L (ref 98–107)
CO2: 30 MMOL/L (ref 20–31)
CO2: 34 MMOL/L (ref 20–31)
CO2: 34 MMOL/L (ref 20–31)
CO2: 35 MMOL/L (ref 20–31)
CO2: 35 MMOL/L (ref 20–31)
CO2: 37 MMOL/L (ref 20–31)
CO2: 38 MMOL/L (ref 20–31)
CREAT SERPL-MCNC: 0.71 MG/DL (ref 0.7–1.2)
CREAT SERPL-MCNC: 0.76 MG/DL (ref 0.7–1.2)
CREAT SERPL-MCNC: 0.77 MG/DL (ref 0.7–1.2)
CREAT SERPL-MCNC: 0.78 MG/DL (ref 0.7–1.2)
CREAT SERPL-MCNC: 0.78 MG/DL (ref 0.7–1.2)
CREAT SERPL-MCNC: 0.82 MG/DL (ref 0.7–1.2)
CREAT SERPL-MCNC: 0.82 MG/DL (ref 0.7–1.2)
CREAT SERPL-MCNC: 0.94 MG/DL (ref 0.7–1.2)
CREAT SERPL-MCNC: 0.95 MG/DL (ref 0.7–1.2)
CREAT SERPL-MCNC: 1 MG/DL (ref 0.7–1.2)
DIFFERENTIAL TYPE: ABNORMAL
EKG ATRIAL RATE: 100 BPM
EKG P AXIS: 51 DEGREES
EKG P-R INTERVAL: 168 MS
EKG Q-T INTERVAL: 412 MS
EKG QRS DURATION: 104 MS
EKG QTC CALCULATION (BAZETT): 531 MS
EKG R AXIS: 85 DEGREES
EKG T AXIS: 1 DEGREES
EKG VENTRICULAR RATE: 100 BPM
EOSINOPHILS RELATIVE PERCENT: 0 % (ref 0–4)
EOSINOPHILS RELATIVE PERCENT: 1 % (ref 0–4)
EOSINOPHILS RELATIVE PERCENT: 7 % (ref 0–4)
FIO2: ABNORMAL
GFR AFRICAN AMERICAN: >60 ML/MIN
GFR NON-AFRICAN AMERICAN: >60 ML/MIN
GFR SERPL CREATININE-BSD FRML MDRD: ABNORMAL ML/MIN/{1.73_M2}
GLUCOSE BLD-MCNC: 101 MG/DL (ref 70–99)
GLUCOSE BLD-MCNC: 119 MG/DL (ref 70–99)
GLUCOSE BLD-MCNC: 127 MG/DL (ref 70–99)
GLUCOSE BLD-MCNC: 138 MG/DL (ref 70–99)
GLUCOSE BLD-MCNC: 159 MG/DL (ref 70–99)
GLUCOSE BLD-MCNC: 160 MG/DL (ref 70–99)
GLUCOSE BLD-MCNC: 164 MG/DL (ref 70–99)
GLUCOSE BLD-MCNC: 175 MG/DL (ref 70–99)
GLUCOSE BLD-MCNC: 94 MG/DL (ref 70–99)
GLUCOSE BLD-MCNC: 95 MG/DL (ref 70–99)
HCO3 ARTERIAL: 39.4 MMOL/L (ref 22–26)
HCT VFR BLD CALC: 43.7 % (ref 41–53)
HCT VFR BLD CALC: 44.4 % (ref 41–53)
HCT VFR BLD CALC: 44.8 % (ref 41–53)
HEMOGLOBIN: 14 G/DL (ref 13.5–17.5)
HEMOGLOBIN: 14.1 G/DL (ref 13.5–17.5)
HEMOGLOBIN: 14.6 G/DL (ref 13.5–17.5)
IMMATURE GRANULOCYTES: ABNORMAL %
LV EF: 50 %
LVEF MODALITY: NORMAL
LYMPHOCYTES # BLD: 12 % (ref 24–44)
LYMPHOCYTES # BLD: 3 % (ref 24–44)
LYMPHOCYTES # BLD: 6 % (ref 24–44)
MAGNESIUM: 2.1 MG/DL (ref 1.6–2.6)
MAGNESIUM: 2.3 MG/DL (ref 1.6–2.6)
MCH RBC QN AUTO: 29.3 PG (ref 26–34)
MCH RBC QN AUTO: 29.4 PG (ref 26–34)
MCH RBC QN AUTO: 31.1 PG (ref 26–34)
MCHC RBC AUTO-ENTMCNC: 31.6 G/DL (ref 31–37)
MCHC RBC AUTO-ENTMCNC: 32 G/DL (ref 31–37)
MCHC RBC AUTO-ENTMCNC: 32.8 G/DL (ref 31–37)
MCV RBC AUTO: 91.6 FL (ref 80–100)
MCV RBC AUTO: 93 FL (ref 80–100)
MCV RBC AUTO: 94.7 FL (ref 80–100)
METHEMOGLOBIN: 0.3 % (ref 0–1.9)
MODE: ABNORMAL
MONOCYTES # BLD: 11 % (ref 1–7)
MONOCYTES # BLD: 3 % (ref 1–7)
MONOCYTES # BLD: 7 % (ref 1–7)
MORPHOLOGY: ABNORMAL
MORPHOLOGY: NORMAL
NEGATIVE BASE EXCESS, ART: ABNORMAL MMOL/L (ref 0–2)
NOTIFICATION TIME: ABNORMAL
NOTIFICATION: ABNORMAL
NRBC AUTOMATED: ABNORMAL PER 100 WBC
O2 DEVICE/FLOW/%: ABNORMAL
O2 SAT, ARTERIAL: 93.8 % (ref 95–98)
OXYHEMOGLOBIN: ABNORMAL % (ref 95–98)
PATIENT TEMP: 37
PCO2 ARTERIAL: 60.1 MMHG (ref 35–45)
PCO2, ART, TEMP ADJ: ABNORMAL (ref 35–45)
PDW BLD-RTO: 15.2 % (ref 11.5–14.9)
PDW BLD-RTO: 15.5 % (ref 11.5–14.9)
PDW BLD-RTO: 15.7 % (ref 11.5–14.9)
PEEP/CPAP: ABNORMAL
PH ARTERIAL: 7.42 (ref 7.35–7.45)
PH, ART, TEMP ADJ: ABNORMAL (ref 7.35–7.45)
PLATELET # BLD: 218 K/UL (ref 150–450)
PLATELET # BLD: 228 K/UL (ref 150–450)
PLATELET # BLD: 259 K/UL (ref 150–450)
PLATELET ESTIMATE: ABNORMAL
PMV BLD AUTO: 8.8 FL (ref 6–12)
PMV BLD AUTO: 8.9 FL (ref 6–12)
PMV BLD AUTO: 9 FL (ref 6–12)
PO2 ARTERIAL: 71.7 MMHG (ref 80–100)
PO2, ART, TEMP ADJ: ABNORMAL MMHG (ref 80–100)
POSITIVE BASE EXCESS, ART: 15 MMOL/L (ref 0–2)
POTASSIUM SERPL-SCNC: 3.8 MMOL/L (ref 3.7–5.3)
POTASSIUM SERPL-SCNC: 4 MMOL/L (ref 3.7–5.3)
POTASSIUM SERPL-SCNC: 4.2 MMOL/L (ref 3.7–5.3)
POTASSIUM SERPL-SCNC: 4.2 MMOL/L (ref 3.7–5.3)
POTASSIUM SERPL-SCNC: 4.3 MMOL/L (ref 3.7–5.3)
POTASSIUM SERPL-SCNC: 4.5 MMOL/L (ref 3.7–5.3)
POTASSIUM SERPL-SCNC: 4.5 MMOL/L (ref 3.7–5.3)
POTASSIUM SERPL-SCNC: 4.6 MMOL/L (ref 3.7–5.3)
POTASSIUM SERPL-SCNC: 4.6 MMOL/L (ref 3.7–5.3)
POTASSIUM SERPL-SCNC: 4.7 MMOL/L (ref 3.7–5.3)
PRO-BNP: 868 PG/ML
PROCALCITONIN: 0.09 NG/ML
PSV: ABNORMAL
PT. POSITION: ABNORMAL
RBC # BLD: 4.68 M/UL (ref 4.5–5.9)
RBC # BLD: 4.77 M/UL (ref 4.5–5.9)
RBC # BLD: 4.82 M/UL (ref 4.5–5.9)
RBC # BLD: ABNORMAL 10*6/UL
RESPIRATORY RATE: 18
SAMPLE SITE: ABNORMAL
SARS-COV-2, PCR: NORMAL
SARS-COV-2, RAPID: NOT DETECTED
SARS-COV-2: NORMAL
SEG NEUTROPHILS: 69 % (ref 36–66)
SEG NEUTROPHILS: 87 % (ref 36–66)
SEG NEUTROPHILS: 88 % (ref 36–66)
SEGMENTED NEUTROPHILS ABSOLUTE COUNT: 6 K/UL (ref 1.3–9.1)
SEGMENTED NEUTROPHILS ABSOLUTE COUNT: 8.8 K/UL (ref 1.3–9.1)
SEGMENTED NEUTROPHILS ABSOLUTE COUNT: 8.87 K/UL (ref 1.3–9.1)
SET RATE: ABNORMAL
SODIUM BLD-SCNC: 140 MMOL/L (ref 135–144)
SODIUM BLD-SCNC: 141 MMOL/L (ref 135–144)
SODIUM BLD-SCNC: 141 MMOL/L (ref 135–144)
SODIUM BLD-SCNC: 142 MMOL/L (ref 135–144)
SODIUM BLD-SCNC: 143 MMOL/L (ref 135–144)
SODIUM BLD-SCNC: 144 MMOL/L (ref 135–144)
SODIUM BLD-SCNC: 144 MMOL/L (ref 135–144)
SODIUM BLD-SCNC: 145 MMOL/L (ref 135–144)
SOURCE: NORMAL
TEXT FOR RESPIRATORY: ABNORMAL
TOTAL HB: ABNORMAL G/DL (ref 12–16)
TOTAL PROTEIN: 7 G/DL (ref 6.4–8.3)
TOTAL PROTEIN: 7 G/DL (ref 6.4–8.3)
TOTAL RATE: ABNORMAL
TROPONIN INTERP: NORMAL
TROPONIN INTERP: NORMAL
TROPONIN T: NORMAL NG/ML
TROPONIN T: NORMAL NG/ML
TROPONIN, HIGH SENSITIVITY: 15 NG/L (ref 0–22)
TROPONIN, HIGH SENSITIVITY: 15 NG/L (ref 0–22)
TSH SERPL DL<=0.05 MIU/L-ACNC: 1.44 MIU/L (ref 0.3–5)
VT: ABNORMAL
WBC # BLD: 10 K/UL (ref 3.5–11)
WBC # BLD: 10.2 K/UL (ref 3.5–11)
WBC # BLD: 8.7 K/UL (ref 3.5–11)
WBC # BLD: ABNORMAL 10*3/UL

## 2020-01-01 PROCEDURE — 6360000002 HC RX W HCPCS: Performed by: INTERNAL MEDICINE

## 2020-01-01 PROCEDURE — 36415 COLL VENOUS BLD VENIPUNCTURE: CPT

## 2020-01-01 PROCEDURE — 6370000000 HC RX 637 (ALT 250 FOR IP): Performed by: INTERNAL MEDICINE

## 2020-01-01 PROCEDURE — 94761 N-INVAS EAR/PLS OXIMETRY MLT: CPT

## 2020-01-01 PROCEDURE — 6360000002 HC RX W HCPCS: Performed by: NURSE PRACTITIONER

## 2020-01-01 PROCEDURE — 6360000002 HC RX W HCPCS: Performed by: STUDENT IN AN ORGANIZED HEALTH CARE EDUCATION/TRAINING PROGRAM

## 2020-01-01 PROCEDURE — 80048 BASIC METABOLIC PNL TOTAL CA: CPT

## 2020-01-01 PROCEDURE — 94640 AIRWAY INHALATION TREATMENT: CPT

## 2020-01-01 PROCEDURE — 99233 SBSQ HOSP IP/OBS HIGH 50: CPT | Performed by: INTERNAL MEDICINE

## 2020-01-01 PROCEDURE — 11042 DBRDMT SUBQ TIS 1ST 20SQCM/<: CPT | Performed by: NURSE PRACTITIONER

## 2020-01-01 PROCEDURE — 6370000000 HC RX 637 (ALT 250 FOR IP): Performed by: NURSE PRACTITIONER

## 2020-01-01 PROCEDURE — 71045 X-RAY EXAM CHEST 1 VIEW: CPT

## 2020-01-01 PROCEDURE — 2580000003 HC RX 258: Performed by: STUDENT IN AN ORGANIZED HEALTH CARE EDUCATION/TRAINING PROGRAM

## 2020-01-01 PROCEDURE — 80053 COMPREHEN METABOLIC PANEL: CPT

## 2020-01-01 PROCEDURE — 36600 WITHDRAWAL OF ARTERIAL BLOOD: CPT

## 2020-01-01 PROCEDURE — 2700000000 HC OXYGEN THERAPY PER DAY

## 2020-01-01 PROCEDURE — 6370000000 HC RX 637 (ALT 250 FOR IP): Performed by: STUDENT IN AN ORGANIZED HEALTH CARE EDUCATION/TRAINING PROGRAM

## 2020-01-01 PROCEDURE — 2060000000 HC ICU INTERMEDIATE R&B

## 2020-01-01 PROCEDURE — 90471 IMMUNIZATION ADMIN: CPT | Performed by: INTERNAL MEDICINE

## 2020-01-01 PROCEDURE — 94660 CPAP INITIATION&MGMT: CPT

## 2020-01-01 PROCEDURE — U0002 COVID-19 LAB TEST NON-CDC: HCPCS

## 2020-01-01 PROCEDURE — 90732 PPSV23 VACC 2 YRS+ SUBQ/IM: CPT | Performed by: INTERNAL MEDICINE

## 2020-01-01 PROCEDURE — 11042 DBRDMT SUBQ TIS 1ST 20SQCM/<: CPT

## 2020-01-01 PROCEDURE — 1036F TOBACCO NON-USER: CPT | Performed by: INTERNAL MEDICINE

## 2020-01-01 PROCEDURE — 94762 N-INVAS EAR/PLS OXIMTRY CONT: CPT

## 2020-01-01 PROCEDURE — 94664 DEMO&/EVAL PT USE INHALER: CPT

## 2020-01-01 PROCEDURE — G8417 CALC BMI ABV UP PARAM F/U: HCPCS | Performed by: PHYSICIAN ASSISTANT

## 2020-01-01 PROCEDURE — G8417 CALC BMI ABV UP PARAM F/U: HCPCS | Performed by: INTERNAL MEDICINE

## 2020-01-01 PROCEDURE — 93306 TTE W/DOPPLER COMPLETE: CPT

## 2020-01-01 PROCEDURE — 90686 IIV4 VACC NO PRSV 0.5 ML IM: CPT | Performed by: INTERNAL MEDICINE

## 2020-01-01 PROCEDURE — 71046 X-RAY EXAM CHEST 2 VIEWS: CPT

## 2020-01-01 PROCEDURE — 85025 COMPLETE CBC W/AUTO DIFF WBC: CPT

## 2020-01-01 PROCEDURE — 97162 PT EVAL MOD COMPLEX 30 MIN: CPT

## 2020-01-01 PROCEDURE — 99232 SBSQ HOSP IP/OBS MODERATE 35: CPT | Performed by: INTERNAL MEDICINE

## 2020-01-01 PROCEDURE — G8482 FLU IMMUNIZE ORDER/ADMIN: HCPCS | Performed by: INTERNAL MEDICINE

## 2020-01-01 PROCEDURE — 93010 ELECTROCARDIOGRAM REPORT: CPT | Performed by: INTERNAL MEDICINE

## 2020-01-01 PROCEDURE — 84443 ASSAY THYROID STIM HORMONE: CPT

## 2020-01-01 PROCEDURE — 3017F COLORECTAL CA SCREEN DOC REV: CPT | Performed by: INTERNAL MEDICINE

## 2020-01-01 PROCEDURE — 90472 IMMUNIZATION ADMIN EACH ADD: CPT | Performed by: INTERNAL MEDICINE

## 2020-01-01 PROCEDURE — 99285 EMERGENCY DEPT VISIT HI MDM: CPT

## 2020-01-01 PROCEDURE — 97116 GAIT TRAINING THERAPY: CPT

## 2020-01-01 PROCEDURE — 94667 MNPJ CHEST WALL 1ST: CPT

## 2020-01-01 PROCEDURE — 1036F TOBACCO NON-USER: CPT | Performed by: PHYSICIAN ASSISTANT

## 2020-01-01 PROCEDURE — 12002 RPR S/N/AX/GEN/TRNK2.6-7.5CM: CPT

## 2020-01-01 PROCEDURE — 3017F COLORECTAL CA SCREEN DOC REV: CPT | Performed by: PHYSICIAN ASSISTANT

## 2020-01-01 PROCEDURE — 97530 THERAPEUTIC ACTIVITIES: CPT

## 2020-01-01 PROCEDURE — 99214 OFFICE O/P EST MOD 30 MIN: CPT | Performed by: PHYSICIAN ASSISTANT

## 2020-01-01 PROCEDURE — 83735 ASSAY OF MAGNESIUM: CPT

## 2020-01-01 PROCEDURE — 84484 ASSAY OF TROPONIN QUANT: CPT

## 2020-01-01 PROCEDURE — 97110 THERAPEUTIC EXERCISES: CPT

## 2020-01-01 PROCEDURE — 97166 OT EVAL MOD COMPLEX 45 MIN: CPT

## 2020-01-01 PROCEDURE — 99203 OFFICE O/P NEW LOW 30 MIN: CPT | Performed by: NURSE PRACTITIONER

## 2020-01-01 PROCEDURE — 6370000000 HC RX 637 (ALT 250 FOR IP): Performed by: PHYSICIAN ASSISTANT

## 2020-01-01 PROCEDURE — 99239 HOSP IP/OBS DSCHRG MGMT >30: CPT | Performed by: INTERNAL MEDICINE

## 2020-01-01 PROCEDURE — G8427 DOCREV CUR MEDS BY ELIG CLIN: HCPCS | Performed by: PHYSICIAN ASSISTANT

## 2020-01-01 PROCEDURE — 99223 1ST HOSP IP/OBS HIGH 75: CPT | Performed by: INTERNAL MEDICINE

## 2020-01-01 PROCEDURE — 99213 OFFICE O/P EST LOW 20 MIN: CPT

## 2020-01-01 PROCEDURE — 99203 OFFICE O/P NEW LOW 30 MIN: CPT | Performed by: PLASTIC SURGERY

## 2020-01-01 PROCEDURE — 82805 BLOOD GASES W/O2 SATURATION: CPT

## 2020-01-01 PROCEDURE — 99213 OFFICE O/P EST LOW 20 MIN: CPT | Performed by: PLASTIC SURGERY

## 2020-01-01 PROCEDURE — 84145 PROCALCITONIN (PCT): CPT

## 2020-01-01 PROCEDURE — 93005 ELECTROCARDIOGRAM TRACING: CPT | Performed by: EMERGENCY MEDICINE

## 2020-01-01 PROCEDURE — G8428 CUR MEDS NOT DOCUMENT: HCPCS | Performed by: INTERNAL MEDICINE

## 2020-01-01 PROCEDURE — 99211 OFF/OP EST MAY X REQ PHY/QHP: CPT

## 2020-01-01 PROCEDURE — 99283 EMERGENCY DEPT VISIT LOW MDM: CPT

## 2020-01-01 PROCEDURE — 83880 ASSAY OF NATRIURETIC PEPTIDE: CPT

## 2020-01-01 PROCEDURE — 2500000003 HC RX 250 WO HCPCS: Performed by: PHYSICIAN ASSISTANT

## 2020-01-01 PROCEDURE — 99214 OFFICE O/P EST MOD 30 MIN: CPT | Performed by: INTERNAL MEDICINE

## 2020-01-01 RX ORDER — AMLODIPINE BESYLATE 5 MG/1
TABLET ORAL
Qty: 30 TABLET | Refills: 2 | Status: ON HOLD | OUTPATIENT
Start: 2020-01-01 | End: 2020-01-01 | Stop reason: HOSPADM

## 2020-01-01 RX ORDER — PREDNISONE 20 MG/1
20 TABLET ORAL DAILY
Qty: 10 TABLET | Refills: 0 | Status: SHIPPED | OUTPATIENT
Start: 2020-01-01 | End: 2020-01-01 | Stop reason: HOSPADM

## 2020-01-01 RX ORDER — POTASSIUM CHLORIDE 7.45 MG/ML
10 INJECTION INTRAVENOUS PRN
Status: DISCONTINUED | OUTPATIENT
Start: 2020-01-01 | End: 2020-01-01 | Stop reason: HOSPADM

## 2020-01-01 RX ORDER — LORAZEPAM 0.5 MG/1
0.5 TABLET ORAL 3 TIMES DAILY PRN
Qty: 15 TABLET | Refills: 0 | Status: SHIPPED | OUTPATIENT
Start: 2020-01-01 | End: 2020-01-01

## 2020-01-01 RX ORDER — AMOXICILLIN AND CLAVULANATE POTASSIUM 875; 125 MG/1; MG/1
1 TABLET, FILM COATED ORAL EVERY 12 HOURS SCHEDULED
Qty: 10 TABLET | Refills: 0 | Status: SHIPPED | OUTPATIENT
Start: 2020-01-01 | End: 2020-01-01 | Stop reason: HOSPADM

## 2020-01-01 RX ORDER — LEVOTHYROXINE SODIUM 0.03 MG/1
25 TABLET ORAL DAILY
Qty: 90 TABLET | Refills: 3 | Status: SHIPPED | OUTPATIENT
Start: 2020-01-01 | End: 2020-01-01 | Stop reason: SDUPTHER

## 2020-01-01 RX ORDER — SODIUM CHLORIDE FOR INHALATION 0.9 %
3 VIAL, NEBULIZER (ML) INHALATION
Status: DISCONTINUED | OUTPATIENT
Start: 2020-01-01 | End: 2020-01-01

## 2020-01-01 RX ORDER — LISINOPRIL 20 MG/1
20 TABLET ORAL DAILY
Qty: 30 TABLET | Refills: 3 | Status: SHIPPED | OUTPATIENT
Start: 2020-01-01 | End: 2021-01-01 | Stop reason: SDUPTHER

## 2020-01-01 RX ORDER — PREDNISONE 10 MG/1
10 TABLET ORAL DAILY
Status: DISCONTINUED | OUTPATIENT
Start: 2020-01-01 | End: 2020-01-01

## 2020-01-01 RX ORDER — LIDOCAINE 50 MG/G
OINTMENT TOPICAL ONCE
Status: CANCELLED | OUTPATIENT
Start: 2020-01-01

## 2020-01-01 RX ORDER — METHYLPREDNISOLONE SODIUM SUCCINATE 40 MG/ML
40 INJECTION, POWDER, LYOPHILIZED, FOR SOLUTION INTRAMUSCULAR; INTRAVENOUS EVERY 8 HOURS
Status: DISCONTINUED | OUTPATIENT
Start: 2020-01-01 | End: 2020-01-01

## 2020-01-01 RX ORDER — AMOXICILLIN AND CLAVULANATE POTASSIUM 875; 125 MG/1; MG/1
1 TABLET, FILM COATED ORAL ONCE
Status: COMPLETED | OUTPATIENT
Start: 2020-01-01 | End: 2020-01-01

## 2020-01-01 RX ORDER — POLYETHYLENE GLYCOL 3350 17 G/17G
17 POWDER, FOR SOLUTION ORAL DAILY PRN
Status: DISCONTINUED | OUTPATIENT
Start: 2020-01-01 | End: 2020-01-01 | Stop reason: HOSPADM

## 2020-01-01 RX ORDER — FUROSEMIDE 10 MG/ML
20 INJECTION INTRAMUSCULAR; INTRAVENOUS 2 TIMES DAILY
Status: DISCONTINUED | OUTPATIENT
Start: 2020-01-01 | End: 2020-01-01

## 2020-01-01 RX ORDER — METHYLPREDNISOLONE SODIUM SUCCINATE 40 MG/ML
30 INJECTION, POWDER, LYOPHILIZED, FOR SOLUTION INTRAMUSCULAR; INTRAVENOUS EVERY 8 HOURS
Status: DISCONTINUED | OUTPATIENT
Start: 2020-01-01 | End: 2020-01-01

## 2020-01-01 RX ORDER — FUROSEMIDE 20 MG/1
20 TABLET ORAL DAILY PRN
Qty: 90 TABLET | Refills: 3 | Status: SHIPPED | OUTPATIENT
Start: 2020-01-01 | End: 2021-01-01 | Stop reason: SDUPTHER

## 2020-01-01 RX ORDER — ZOLPIDEM TARTRATE 12.5 MG/1
12.5 TABLET, FILM COATED, EXTENDED RELEASE ORAL NIGHTLY PRN
COMMUNITY

## 2020-01-01 RX ORDER — AMOXICILLIN AND CLAVULANATE POTASSIUM 875; 125 MG/1; MG/1
1 TABLET, FILM COATED ORAL EVERY 12 HOURS SCHEDULED
Status: COMPLETED | OUTPATIENT
Start: 2020-01-01 | End: 2020-01-01

## 2020-01-01 RX ORDER — PREDNISONE 1 MG/1
20 TABLET ORAL DAILY
COMMUNITY

## 2020-01-01 RX ORDER — PANTOPRAZOLE SODIUM 40 MG/1
40 TABLET, DELAYED RELEASE ORAL
Qty: 90 TABLET | Refills: 3 | Status: SHIPPED | OUTPATIENT
Start: 2020-01-01 | End: 2021-01-01 | Stop reason: SDUPTHER

## 2020-01-01 RX ORDER — LEVALBUTEROL 1.25 MG/.5ML
1.25 SOLUTION, CONCENTRATE RESPIRATORY (INHALATION) EVERY 4 HOURS
Status: DISCONTINUED | OUTPATIENT
Start: 2020-01-01 | End: 2020-01-01 | Stop reason: HOSPADM

## 2020-01-01 RX ORDER — FUROSEMIDE 20 MG/1
20 TABLET ORAL DAILY
Status: DISCONTINUED | OUTPATIENT
Start: 2020-01-01 | End: 2020-01-01

## 2020-01-01 RX ORDER — POTASSIUM CHLORIDE 20 MEQ/1
40 TABLET, EXTENDED RELEASE ORAL PRN
Status: DISCONTINUED | OUTPATIENT
Start: 2020-01-01 | End: 2020-01-01 | Stop reason: HOSPADM

## 2020-01-01 RX ORDER — LIDOCAINE 40 MG/G
CREAM TOPICAL ONCE
Status: CANCELLED | OUTPATIENT
Start: 2020-01-01

## 2020-01-01 RX ORDER — LIDOCAINE HYDROCHLORIDE 20 MG/ML
JELLY TOPICAL ONCE
Status: CANCELLED | OUTPATIENT
Start: 2020-01-01

## 2020-01-01 RX ORDER — LIDOCAINE HYDROCHLORIDE 40 MG/ML
SOLUTION TOPICAL ONCE
Status: CANCELLED | OUTPATIENT
Start: 2020-01-01

## 2020-01-01 RX ORDER — AMOXICILLIN AND CLAVULANATE POTASSIUM 500; 125 MG/1; MG/1
1 TABLET, FILM COATED ORAL 2 TIMES DAILY
Qty: 20 TABLET | Refills: 0 | Status: ON HOLD | OUTPATIENT
Start: 2020-01-01 | End: 2020-01-01 | Stop reason: HOSPADM

## 2020-01-01 RX ORDER — ALBUTEROL SULFATE 2.5 MG/3ML
2.5 SOLUTION RESPIRATORY (INHALATION) EVERY 6 HOURS PRN
Qty: 360 EACH | Refills: 3 | Status: SHIPPED | OUTPATIENT
Start: 2020-01-01 | End: 2021-01-01 | Stop reason: SDUPTHER

## 2020-01-01 RX ORDER — LISINOPRIL 20 MG/1
20 TABLET ORAL DAILY
Qty: 30 TABLET | Refills: 3 | Status: SHIPPED | OUTPATIENT
Start: 2020-01-01 | End: 2020-01-01 | Stop reason: SDUPTHER

## 2020-01-01 RX ORDER — LORAZEPAM 0.5 MG/1
0.5 TABLET ORAL NIGHTLY PRN
Status: DISCONTINUED | OUTPATIENT
Start: 2020-01-01 | End: 2020-01-01 | Stop reason: HOSPADM

## 2020-01-01 RX ORDER — LIDOCAINE HYDROCHLORIDE 40 MG/ML
SOLUTION TOPICAL ONCE
Status: DISCONTINUED | OUTPATIENT
Start: 2020-01-01 | End: 2020-01-01 | Stop reason: HOSPADM

## 2020-01-01 RX ORDER — LEVOTHYROXINE SODIUM 0.03 MG/1
25 TABLET ORAL DAILY
Qty: 90 TABLET | Refills: 3 | Status: SHIPPED | OUTPATIENT
Start: 2020-01-01 | End: 2021-01-01 | Stop reason: SDUPTHER

## 2020-01-01 RX ORDER — PREDNISONE 20 MG/1
40 TABLET ORAL DAILY
Status: DISCONTINUED | OUTPATIENT
Start: 2020-01-01 | End: 2020-01-01 | Stop reason: HOSPADM

## 2020-01-01 RX ORDER — LEVALBUTEROL 1.25 MG/.5ML
1.25 SOLUTION, CONCENTRATE RESPIRATORY (INHALATION)
Status: DISCONTINUED | OUTPATIENT
Start: 2020-01-01 | End: 2020-01-01

## 2020-01-01 RX ORDER — FUROSEMIDE 20 MG/1
20 TABLET ORAL DAILY PRN
COMMUNITY
End: 2020-01-01 | Stop reason: SDUPTHER

## 2020-01-01 RX ORDER — DILTIAZEM HYDROCHLORIDE 120 MG/1
120 CAPSULE, COATED, EXTENDED RELEASE ORAL DAILY
Qty: 30 CAPSULE | Refills: 3 | Status: ON HOLD | OUTPATIENT
Start: 2020-01-01 | End: 2021-01-01 | Stop reason: HOSPADM

## 2020-01-01 RX ORDER — LEVALBUTEROL 1.25 MG/.5ML
1.25 SOLUTION, CONCENTRATE RESPIRATORY (INHALATION) EVERY 8 HOURS PRN
Status: DISCONTINUED | OUTPATIENT
Start: 2020-01-01 | End: 2020-01-01

## 2020-01-01 RX ORDER — OMEPRAZOLE/SODIUM BICARBONATE 20; 1680 MG/1; MG/1
20 POWDER, FOR SUSPENSION ORAL 2 TIMES DAILY
Qty: 30 EACH | Refills: 3 | Status: SHIPPED | OUTPATIENT
Start: 2020-01-01 | End: 2020-01-01

## 2020-01-01 RX ORDER — GUAIFENESIN 600 MG/1
1200 TABLET, EXTENDED RELEASE ORAL 2 TIMES DAILY
Status: DISCONTINUED | OUTPATIENT
Start: 2020-01-01 | End: 2020-01-01 | Stop reason: HOSPADM

## 2020-01-01 RX ORDER — PROMETHAZINE HYDROCHLORIDE 25 MG/1
12.5 TABLET ORAL EVERY 6 HOURS PRN
Status: DISCONTINUED | OUTPATIENT
Start: 2020-01-01 | End: 2020-01-01 | Stop reason: HOSPADM

## 2020-01-01 RX ORDER — BUDESONIDE AND FORMOTEROL FUMARATE DIHYDRATE 160; 4.5 UG/1; UG/1
2 AEROSOL RESPIRATORY (INHALATION) 2 TIMES DAILY
Status: DISCONTINUED | OUTPATIENT
Start: 2020-01-01 | End: 2020-01-01 | Stop reason: HOSPADM

## 2020-01-01 RX ORDER — LISINOPRIL 20 MG/1
40 TABLET ORAL DAILY
Status: DISCONTINUED | OUTPATIENT
Start: 2020-01-01 | End: 2020-01-01

## 2020-01-01 RX ORDER — DILTIAZEM HYDROCHLORIDE 120 MG/1
120 CAPSULE, COATED, EXTENDED RELEASE ORAL DAILY
Status: DISCONTINUED | OUTPATIENT
Start: 2020-01-01 | End: 2020-01-01 | Stop reason: HOSPADM

## 2020-01-01 RX ORDER — ACETAMINOPHEN 325 MG/1
650 TABLET ORAL EVERY 6 HOURS PRN
Status: DISCONTINUED | OUTPATIENT
Start: 2020-01-01 | End: 2020-01-01 | Stop reason: HOSPADM

## 2020-01-01 RX ORDER — AMLODIPINE BESYLATE 5 MG/1
5 TABLET ORAL DAILY
Status: DISCONTINUED | OUTPATIENT
Start: 2020-01-01 | End: 2020-01-01 | Stop reason: ALTCHOICE

## 2020-01-01 RX ORDER — ONDANSETRON 2 MG/ML
4 INJECTION INTRAMUSCULAR; INTRAVENOUS EVERY 6 HOURS PRN
Status: DISCONTINUED | OUTPATIENT
Start: 2020-01-01 | End: 2020-01-01 | Stop reason: HOSPADM

## 2020-01-01 RX ORDER — FUROSEMIDE 40 MG/1
40 TABLET ORAL DAILY
Status: DISCONTINUED | OUTPATIENT
Start: 2020-01-01 | End: 2020-01-01 | Stop reason: HOSPADM

## 2020-01-01 RX ORDER — PREDNISONE 20 MG/1
40 TABLET ORAL DAILY
Qty: 60 TABLET | Refills: 0 | Status: SHIPPED | OUTPATIENT
Start: 2020-01-01 | End: 2020-01-01

## 2020-01-01 RX ORDER — LEVOTHYROXINE SODIUM 0.03 MG/1
25 TABLET ORAL DAILY
Status: DISCONTINUED | OUTPATIENT
Start: 2020-01-01 | End: 2020-01-01 | Stop reason: HOSPADM

## 2020-01-01 RX ORDER — PANTOPRAZOLE SODIUM 40 MG/1
40 TABLET, DELAYED RELEASE ORAL
Status: DISCONTINUED | OUTPATIENT
Start: 2020-01-01 | End: 2020-01-01 | Stop reason: HOSPADM

## 2020-01-01 RX ORDER — DOXYCYCLINE HYCLATE 100 MG
100 TABLET ORAL 2 TIMES DAILY
Qty: 14 TABLET | Refills: 0 | Status: SHIPPED | OUTPATIENT
Start: 2020-01-01 | End: 2020-01-01

## 2020-01-01 RX ORDER — AMOXICILLIN AND CLAVULANATE POTASSIUM 500; 125 MG/1; MG/1
1 TABLET, FILM COATED ORAL 2 TIMES DAILY
Status: DISCONTINUED | OUTPATIENT
Start: 2020-01-01 | End: 2020-01-01

## 2020-01-01 RX ORDER — IBUPROFEN 800 MG/1
800 TABLET ORAL 2 TIMES DAILY
Qty: 180 TABLET | Refills: 3 | Status: SHIPPED | OUTPATIENT
Start: 2020-01-01 | End: 2021-01-01 | Stop reason: SDUPTHER

## 2020-01-01 RX ORDER — AMOXICILLIN AND CLAVULANATE POTASSIUM 875; 125 MG/1; MG/1
1 TABLET, FILM COATED ORAL EVERY 12 HOURS SCHEDULED
Status: DISCONTINUED | OUTPATIENT
Start: 2020-01-01 | End: 2020-01-01

## 2020-01-01 RX ORDER — LISINOPRIL 20 MG/1
20 TABLET ORAL DAILY
Status: DISCONTINUED | OUTPATIENT
Start: 2020-01-01 | End: 2020-01-01 | Stop reason: HOSPADM

## 2020-01-01 RX ORDER — PANTOPRAZOLE SODIUM 40 MG/1
40 TABLET, DELAYED RELEASE ORAL
Qty: 30 TABLET | Refills: 5 | Status: SHIPPED | OUTPATIENT
Start: 2020-01-01 | End: 2020-01-01 | Stop reason: SDUPTHER

## 2020-01-01 RX ORDER — SODIUM CHLORIDE 0.9 % (FLUSH) 0.9 %
10 SYRINGE (ML) INJECTION PRN
Status: DISCONTINUED | OUTPATIENT
Start: 2020-01-01 | End: 2020-01-01 | Stop reason: HOSPADM

## 2020-01-01 RX ORDER — METHYLPREDNISOLONE SODIUM SUCCINATE 125 MG/2ML
60 INJECTION, POWDER, LYOPHILIZED, FOR SOLUTION INTRAMUSCULAR; INTRAVENOUS EVERY 6 HOURS
Status: DISCONTINUED | OUTPATIENT
Start: 2020-01-01 | End: 2020-01-01

## 2020-01-01 RX ORDER — PANTOPRAZOLE SODIUM 40 MG/1
TABLET, DELAYED RELEASE ORAL
Qty: 30 TABLET | Refills: 0 | Status: ON HOLD | OUTPATIENT
Start: 2020-01-01 | End: 2020-01-01 | Stop reason: HOSPADM

## 2020-01-01 RX ORDER — ALBUTEROL SULFATE 2.5 MG/3ML
2.5 SOLUTION RESPIRATORY (INHALATION) EVERY 4 HOURS PRN
Status: DISCONTINUED | OUTPATIENT
Start: 2020-01-01 | End: 2020-01-01 | Stop reason: HOSPADM

## 2020-01-01 RX ORDER — ACETAMINOPHEN 650 MG/1
650 SUPPOSITORY RECTAL EVERY 6 HOURS PRN
Status: DISCONTINUED | OUTPATIENT
Start: 2020-01-01 | End: 2020-01-01 | Stop reason: HOSPADM

## 2020-01-01 RX ORDER — LIDOCAINE HYDROCHLORIDE 40 MG/ML
SOLUTION TOPICAL ONCE
Status: COMPLETED | OUTPATIENT
Start: 2020-01-01 | End: 2020-01-01

## 2020-01-01 RX ORDER — PREDNISONE 20 MG/1
20 TABLET ORAL DAILY
Status: DISCONTINUED | OUTPATIENT
Start: 2020-01-01 | End: 2020-01-01

## 2020-01-01 RX ORDER — LIDOCAINE HYDROCHLORIDE 10 MG/ML
5 INJECTION, SOLUTION INFILTRATION; PERINEURAL ONCE
Status: COMPLETED | OUTPATIENT
Start: 2020-01-01 | End: 2020-01-01

## 2020-01-01 RX ORDER — METHYLPREDNISOLONE SODIUM SUCCINATE 40 MG/ML
40 INJECTION, POWDER, LYOPHILIZED, FOR SOLUTION INTRAMUSCULAR; INTRAVENOUS ONCE
Status: COMPLETED | OUTPATIENT
Start: 2020-01-01 | End: 2020-01-01

## 2020-01-01 RX ORDER — SODIUM CHLORIDE 0.9 % (FLUSH) 0.9 %
10 SYRINGE (ML) INJECTION EVERY 12 HOURS SCHEDULED
Status: DISCONTINUED | OUTPATIENT
Start: 2020-01-01 | End: 2020-01-01 | Stop reason: HOSPADM

## 2020-01-01 RX ORDER — PREDNISONE 20 MG/1
40 TABLET ORAL DAILY
Qty: 62 TABLET | Refills: 0
Start: 2020-01-01 | End: 2020-01-01

## 2020-01-01 RX ORDER — DILTIAZEM HYDROCHLORIDE 120 MG/1
120 CAPSULE, COATED, EXTENDED RELEASE ORAL DAILY
Qty: 30 CAPSULE | Refills: 3 | Status: SHIPPED | OUTPATIENT
Start: 2020-01-01 | End: 2020-01-01 | Stop reason: SDUPTHER

## 2020-01-01 RX ORDER — FUROSEMIDE 10 MG/ML
20 INJECTION INTRAMUSCULAR; INTRAVENOUS DAILY
Status: DISCONTINUED | OUTPATIENT
Start: 2020-01-01 | End: 2020-01-01

## 2020-01-01 RX ORDER — DULOXETIN HYDROCHLORIDE 30 MG/1
30 CAPSULE, DELAYED RELEASE ORAL DAILY
Status: DISCONTINUED | OUTPATIENT
Start: 2020-01-01 | End: 2020-01-01 | Stop reason: HOSPADM

## 2020-01-01 RX ADMIN — AMOXICILLIN AND CLAVULANATE POTASSIUM 1 TABLET: 875; 125 TABLET, FILM COATED ORAL at 10:07

## 2020-01-01 RX ADMIN — METHYLPREDNISOLONE SODIUM SUCCINATE 30 MG: 40 INJECTION, POWDER, FOR SOLUTION INTRAMUSCULAR; INTRAVENOUS at 01:30

## 2020-01-01 RX ADMIN — DULOXETINE 30 MG: 30 CAPSULE, DELAYED RELEASE ORAL at 09:25

## 2020-01-01 RX ADMIN — AMOXICILLIN AND CLAVULANATE POTASSIUM 1 TABLET: 875; 125 TABLET, FILM COATED ORAL at 22:09

## 2020-01-01 RX ADMIN — Medication 10 ML: at 22:11

## 2020-01-01 RX ADMIN — GUAIFENESIN 1200 MG: 600 TABLET, EXTENDED RELEASE ORAL at 22:08

## 2020-01-01 RX ADMIN — SALINE NASAL SPRAY 1 SPRAY: 1.5 SOLUTION NASAL at 11:23

## 2020-01-01 RX ADMIN — SALINE NASAL SPRAY 1 SPRAY: 1.5 SOLUTION NASAL at 09:11

## 2020-01-01 RX ADMIN — BUDESONIDE AND FORMOTEROL FUMARATE DIHYDRATE 2 PUFF: 160; 4.5 AEROSOL RESPIRATORY (INHALATION) at 08:38

## 2020-01-01 RX ADMIN — ENOXAPARIN SODIUM 30 MG: 30 INJECTION SUBCUTANEOUS at 21:25

## 2020-01-01 RX ADMIN — BUDESONIDE AND FORMOTEROL FUMARATE DIHYDRATE 2 PUFF: 160; 4.5 AEROSOL RESPIRATORY (INHALATION) at 08:03

## 2020-01-01 RX ADMIN — LORAZEPAM 0.5 MG: 0.5 TABLET ORAL at 22:08

## 2020-01-01 RX ADMIN — PANTOPRAZOLE SODIUM 40 MG: 40 TABLET, DELAYED RELEASE ORAL at 06:12

## 2020-01-01 RX ADMIN — DULOXETINE 30 MG: 30 CAPSULE, DELAYED RELEASE ORAL at 09:14

## 2020-01-01 RX ADMIN — METHYLPREDNISOLONE SODIUM SUCCINATE 40 MG: 40 INJECTION, POWDER, FOR SOLUTION INTRAMUSCULAR; INTRAVENOUS at 08:03

## 2020-01-01 RX ADMIN — LEVALBUTEROL HYDROCHLORIDE 1.25 MG: 1.25 SOLUTION, CONCENTRATE RESPIRATORY (INHALATION) at 19:08

## 2020-01-01 RX ADMIN — LORAZEPAM 0.5 MG: 0.5 TABLET ORAL at 21:58

## 2020-01-01 RX ADMIN — GUAIFENESIN 1200 MG: 600 TABLET, EXTENDED RELEASE ORAL at 23:04

## 2020-01-01 RX ADMIN — FUROSEMIDE 20 MG: 10 INJECTION, SOLUTION INTRAMUSCULAR; INTRAVENOUS at 12:13

## 2020-01-01 RX ADMIN — FUROSEMIDE 20 MG: 20 TABLET ORAL at 09:25

## 2020-01-01 RX ADMIN — ENOXAPARIN SODIUM 30 MG: 30 INJECTION SUBCUTANEOUS at 19:59

## 2020-01-01 RX ADMIN — IPRATROPIUM BROMIDE 0.5 MG: 0.5 SOLUTION RESPIRATORY (INHALATION) at 22:04

## 2020-01-01 RX ADMIN — FUROSEMIDE 20 MG: 10 INJECTION, SOLUTION INTRAMUSCULAR; INTRAVENOUS at 09:15

## 2020-01-01 RX ADMIN — METHYLPREDNISOLONE SODIUM SUCCINATE 60 MG: 125 INJECTION, POWDER, FOR SOLUTION INTRAMUSCULAR; INTRAVENOUS at 21:52

## 2020-01-01 RX ADMIN — ENOXAPARIN SODIUM 30 MG: 30 INJECTION SUBCUTANEOUS at 08:38

## 2020-01-01 RX ADMIN — LEVOTHYROXINE SODIUM 25 MCG: 0.03 TABLET ORAL at 09:52

## 2020-01-01 RX ADMIN — PREDNISONE 10 MG: 10 TABLET ORAL at 09:14

## 2020-01-01 RX ADMIN — ENOXAPARIN SODIUM 30 MG: 30 INJECTION SUBCUTANEOUS at 08:04

## 2020-01-01 RX ADMIN — ENOXAPARIN SODIUM 30 MG: 30 INJECTION SUBCUTANEOUS at 22:08

## 2020-01-01 RX ADMIN — AMOXICILLIN AND CLAVULANATE POTASSIUM 1 TABLET: 875; 125 TABLET, FILM COATED ORAL at 21:27

## 2020-01-01 RX ADMIN — LISINOPRIL 20 MG: 20 TABLET ORAL at 09:15

## 2020-01-01 RX ADMIN — Medication 10 ML: at 09:15

## 2020-01-01 RX ADMIN — BUDESONIDE AND FORMOTEROL FUMARATE DIHYDRATE 2 PUFF: 160; 4.5 AEROSOL RESPIRATORY (INHALATION) at 08:07

## 2020-01-01 RX ADMIN — METHYLPREDNISOLONE SODIUM SUCCINATE 60 MG: 125 INJECTION, POWDER, FOR SOLUTION INTRAMUSCULAR; INTRAVENOUS at 18:00

## 2020-01-01 RX ADMIN — POLYETHYLENE GLYCOL 3350 17 G: 17 POWDER, FOR SOLUTION ORAL at 08:38

## 2020-01-01 RX ADMIN — BUDESONIDE AND FORMOTEROL FUMARATE DIHYDRATE 2 PUFF: 160; 4.5 AEROSOL RESPIRATORY (INHALATION) at 23:04

## 2020-01-01 RX ADMIN — Medication 10 ML: at 23:04

## 2020-01-01 RX ADMIN — ENOXAPARIN SODIUM 30 MG: 30 INJECTION SUBCUTANEOUS at 21:38

## 2020-01-01 RX ADMIN — DULOXETINE 30 MG: 30 CAPSULE, DELAYED RELEASE ORAL at 08:08

## 2020-01-01 RX ADMIN — LEVOTHYROXINE SODIUM 25 MCG: 0.03 TABLET ORAL at 08:04

## 2020-01-01 RX ADMIN — METHYLPREDNISOLONE SODIUM SUCCINATE 30 MG: 40 INJECTION, POWDER, FOR SOLUTION INTRAMUSCULAR; INTRAVENOUS at 08:08

## 2020-01-01 RX ADMIN — BUDESONIDE AND FORMOTEROL FUMARATE DIHYDRATE 2 PUFF: 160; 4.5 AEROSOL RESPIRATORY (INHALATION) at 21:59

## 2020-01-01 RX ADMIN — METHYLPREDNISOLONE SODIUM SUCCINATE 30 MG: 40 INJECTION, POWDER, FOR SOLUTION INTRAMUSCULAR; INTRAVENOUS at 17:34

## 2020-01-01 RX ADMIN — DILTIAZEM HYDROCHLORIDE 120 MG: 120 CAPSULE, COATED, EXTENDED RELEASE ORAL at 08:04

## 2020-01-01 RX ADMIN — IPRATROPIUM BROMIDE 0.5 MG: 0.5 SOLUTION RESPIRATORY (INHALATION) at 23:37

## 2020-01-01 RX ADMIN — AMOXICILLIN AND CLAVULANATE POTASSIUM 1 TABLET: 875; 125 TABLET, FILM COATED ORAL at 21:57

## 2020-01-01 RX ADMIN — LEVOTHYROXINE SODIUM 25 MCG: 0.03 TABLET ORAL at 09:14

## 2020-01-01 RX ADMIN — LISINOPRIL 20 MG: 20 TABLET ORAL at 08:08

## 2020-01-01 RX ADMIN — AMOXICILLIN AND CLAVULANATE POTASSIUM 1 TABLET: 875; 125 TABLET, FILM COATED ORAL at 23:04

## 2020-01-01 RX ADMIN — LORAZEPAM 0.5 MG: 0.5 TABLET ORAL at 23:04

## 2020-01-01 RX ADMIN — IPRATROPIUM BROMIDE 0.5 MG: 0.5 SOLUTION RESPIRATORY (INHALATION) at 12:05

## 2020-01-01 RX ADMIN — GUAIFENESIN 1200 MG: 600 TABLET, EXTENDED RELEASE ORAL at 08:38

## 2020-01-01 RX ADMIN — IPRATROPIUM BROMIDE 0.5 MG: 0.5 SOLUTION RESPIRATORY (INHALATION) at 15:02

## 2020-01-01 RX ADMIN — GUAIFENESIN 1200 MG: 600 TABLET, EXTENDED RELEASE ORAL at 08:08

## 2020-01-01 RX ADMIN — IPRATROPIUM BROMIDE 0.5 MG: 0.5 SOLUTION RESPIRATORY (INHALATION) at 23:40

## 2020-01-01 RX ADMIN — Medication 3 ML: at 15:33

## 2020-01-01 RX ADMIN — Medication 10 ML: at 08:38

## 2020-01-01 RX ADMIN — LEVALBUTEROL HYDROCHLORIDE 1.25 MG: 1.25 SOLUTION, CONCENTRATE RESPIRATORY (INHALATION) at 14:53

## 2020-01-01 RX ADMIN — METHYLPREDNISOLONE SODIUM SUCCINATE 40 MG: 40 INJECTION, POWDER, FOR SOLUTION INTRAMUSCULAR; INTRAVENOUS at 15:54

## 2020-01-01 RX ADMIN — IPRATROPIUM BROMIDE 0.5 MG: 0.5 SOLUTION RESPIRATORY (INHALATION) at 23:15

## 2020-01-01 RX ADMIN — POLYETHYLENE GLYCOL 3350 17 G: 17 POWDER, FOR SOLUTION ORAL at 21:13

## 2020-01-01 RX ADMIN — DULOXETINE 30 MG: 30 CAPSULE, DELAYED RELEASE ORAL at 10:07

## 2020-01-01 RX ADMIN — ALBUTEROL SULFATE 2.5 MG: 2.5 SOLUTION RESPIRATORY (INHALATION) at 06:03

## 2020-01-01 RX ADMIN — GUAIFENESIN 1200 MG: 600 TABLET, EXTENDED RELEASE ORAL at 21:02

## 2020-01-01 RX ADMIN — LEVOTHYROXINE SODIUM 25 MCG: 0.03 TABLET ORAL at 09:25

## 2020-01-01 RX ADMIN — GUAIFENESIN 1200 MG: 600 TABLET, EXTENDED RELEASE ORAL at 10:07

## 2020-01-01 RX ADMIN — LEVALBUTEROL HYDROCHLORIDE 1.25 MG: 1.25 SOLUTION, CONCENTRATE RESPIRATORY (INHALATION) at 07:11

## 2020-01-01 RX ADMIN — LORAZEPAM 0.5 MG: 0.5 TABLET ORAL at 21:03

## 2020-01-01 RX ADMIN — DILTIAZEM HYDROCHLORIDE 120 MG: 120 CAPSULE, COATED, EXTENDED RELEASE ORAL at 08:57

## 2020-01-01 RX ADMIN — AMLODIPINE BESYLATE 5 MG: 5 TABLET ORAL at 12:54

## 2020-01-01 RX ADMIN — FUROSEMIDE 20 MG: 10 INJECTION, SOLUTION INTRAMUSCULAR; INTRAVENOUS at 08:56

## 2020-01-01 RX ADMIN — AMOXICILLIN AND CLAVULANATE POTASSIUM 1 TABLET: 500; 125 TABLET, FILM COATED ORAL at 21:01

## 2020-01-01 RX ADMIN — AMOXICILLIN AND CLAVULANATE POTASSIUM 1 TABLET: 500; 125 TABLET, FILM COATED ORAL at 09:23

## 2020-01-01 RX ADMIN — DILTIAZEM HYDROCHLORIDE 120 MG: 120 CAPSULE, COATED, EXTENDED RELEASE ORAL at 08:08

## 2020-01-01 RX ADMIN — LIDOCAINE HYDROCHLORIDE 5 ML: 10 INJECTION, SOLUTION INFILTRATION; PERINEURAL at 21:37

## 2020-01-01 RX ADMIN — AMOXICILLIN AND CLAVULANATE POTASSIUM 1 TABLET: 875; 125 TABLET, FILM COATED ORAL at 08:14

## 2020-01-01 RX ADMIN — IPRATROPIUM BROMIDE 0.5 MG: 0.5 SOLUTION RESPIRATORY (INHALATION) at 19:10

## 2020-01-01 RX ADMIN — LIDOCAINE HYDROCHLORIDE 5 ML: 40 SOLUTION TOPICAL at 15:06

## 2020-01-01 RX ADMIN — DILTIAZEM HYDROCHLORIDE 120 MG: 120 CAPSULE, COATED, EXTENDED RELEASE ORAL at 23:03

## 2020-01-01 RX ADMIN — LEVALBUTEROL HYDROCHLORIDE 1.25 MG: 1.25 SOLUTION, CONCENTRATE RESPIRATORY (INHALATION) at 07:28

## 2020-01-01 RX ADMIN — Medication 10 ML: at 18:05

## 2020-01-01 RX ADMIN — AMLODIPINE BESYLATE 5 MG: 5 TABLET ORAL at 09:15

## 2020-01-01 RX ADMIN — IPRATROPIUM BROMIDE 0.5 MG: 0.5 SOLUTION RESPIRATORY (INHALATION) at 03:42

## 2020-01-01 RX ADMIN — ENOXAPARIN SODIUM 30 MG: 30 INJECTION SUBCUTANEOUS at 20:20

## 2020-01-01 RX ADMIN — GUAIFENESIN 1200 MG: 600 TABLET, EXTENDED RELEASE ORAL at 08:05

## 2020-01-01 RX ADMIN — LISINOPRIL 20 MG: 20 TABLET ORAL at 08:04

## 2020-01-01 RX ADMIN — LORAZEPAM 0.5 MG: 0.5 TABLET ORAL at 21:52

## 2020-01-01 RX ADMIN — Medication 10 ML: at 02:26

## 2020-01-01 RX ADMIN — ENOXAPARIN SODIUM 30 MG: 30 INJECTION SUBCUTANEOUS at 21:02

## 2020-01-01 RX ADMIN — LORAZEPAM 0.5 MG: 0.5 TABLET ORAL at 19:59

## 2020-01-01 RX ADMIN — GUAIFENESIN 1200 MG: 600 TABLET, EXTENDED RELEASE ORAL at 08:56

## 2020-01-01 RX ADMIN — PREDNISONE 40 MG: 20 TABLET ORAL at 08:37

## 2020-01-01 RX ADMIN — Medication 3 ML: at 19:10

## 2020-01-01 RX ADMIN — PANTOPRAZOLE SODIUM 40 MG: 40 TABLET, DELAYED RELEASE ORAL at 05:47

## 2020-01-01 RX ADMIN — IPRATROPIUM BROMIDE 0.5 MG: 0.5 SOLUTION RESPIRATORY (INHALATION) at 11:31

## 2020-01-01 RX ADMIN — LISINOPRIL 20 MG: 20 TABLET ORAL at 09:52

## 2020-01-01 RX ADMIN — LEVALBUTEROL HYDROCHLORIDE 1.25 MG: 1.25 SOLUTION, CONCENTRATE RESPIRATORY (INHALATION) at 11:35

## 2020-01-01 RX ADMIN — IPRATROPIUM BROMIDE 0.5 MG: 0.5 SOLUTION RESPIRATORY (INHALATION) at 07:11

## 2020-01-01 RX ADMIN — LEVOTHYROXINE SODIUM 25 MCG: 0.03 TABLET ORAL at 05:51

## 2020-01-01 RX ADMIN — ENOXAPARIN SODIUM 30 MG: 30 INJECTION SUBCUTANEOUS at 08:56

## 2020-01-01 RX ADMIN — IPRATROPIUM BROMIDE 0.5 MG: 0.5 SOLUTION RESPIRATORY (INHALATION) at 06:55

## 2020-01-01 RX ADMIN — AMOXICILLIN AND CLAVULANATE POTASSIUM 1 TABLET: 875; 125 TABLET, FILM COATED ORAL at 23:51

## 2020-01-01 RX ADMIN — IPRATROPIUM BROMIDE 0.5 MG: 0.5 SOLUTION RESPIRATORY (INHALATION) at 07:08

## 2020-01-01 RX ADMIN — PREDNISONE 10 MG: 10 TABLET ORAL at 09:24

## 2020-01-01 RX ADMIN — BUDESONIDE AND FORMOTEROL FUMARATE DIHYDRATE 2 PUFF: 160; 4.5 AEROSOL RESPIRATORY (INHALATION) at 09:22

## 2020-01-01 RX ADMIN — IPRATROPIUM BROMIDE 0.5 MG: 0.5 SOLUTION RESPIRATORY (INHALATION) at 10:58

## 2020-01-01 RX ADMIN — DILTIAZEM HYDROCHLORIDE 120 MG: 120 CAPSULE, COATED, EXTENDED RELEASE ORAL at 09:52

## 2020-01-01 RX ADMIN — PANTOPRAZOLE SODIUM 40 MG: 40 TABLET, DELAYED RELEASE ORAL at 05:51

## 2020-01-01 RX ADMIN — IPRATROPIUM BROMIDE 0.5 MG: 0.5 SOLUTION RESPIRATORY (INHALATION) at 10:55

## 2020-01-01 RX ADMIN — IPRATROPIUM BROMIDE 0.5 MG: 0.5 SOLUTION RESPIRATORY (INHALATION) at 15:45

## 2020-01-01 RX ADMIN — LEVALBUTEROL HYDROCHLORIDE 1.25 MG: 1.25 SOLUTION, CONCENTRATE RESPIRATORY (INHALATION) at 11:31

## 2020-01-01 RX ADMIN — LEVOTHYROXINE SODIUM 25 MCG: 0.03 TABLET ORAL at 08:08

## 2020-01-01 RX ADMIN — DULOXETINE 30 MG: 30 CAPSULE, DELAYED RELEASE ORAL at 08:04

## 2020-01-01 RX ADMIN — Medication 10 ML: at 21:54

## 2020-01-01 RX ADMIN — LORAZEPAM 0.5 MG: 0.5 TABLET ORAL at 21:01

## 2020-01-01 RX ADMIN — MAGNESIUM SULFATE HEPTAHYDRATE 2 G: 500 INJECTION, SOLUTION INTRAMUSCULAR; INTRAVENOUS at 12:13

## 2020-01-01 RX ADMIN — LEVALBUTEROL HYDROCHLORIDE 1.25 MG: 1.25 SOLUTION, CONCENTRATE RESPIRATORY (INHALATION) at 23:40

## 2020-01-01 RX ADMIN — LEVALBUTEROL HYDROCHLORIDE 1.25 MG: 1.25 SOLUTION, CONCENTRATE RESPIRATORY (INHALATION) at 15:16

## 2020-01-01 RX ADMIN — METHYLPREDNISOLONE SODIUM SUCCINATE 40 MG: 40 INJECTION, POWDER, FOR SOLUTION INTRAMUSCULAR; INTRAVENOUS at 02:28

## 2020-01-01 RX ADMIN — BUDESONIDE AND FORMOTEROL FUMARATE DIHYDRATE 2 PUFF: 160; 4.5 AEROSOL RESPIRATORY (INHALATION) at 21:01

## 2020-01-01 RX ADMIN — LEVALBUTEROL HYDROCHLORIDE 1.25 MG: 1.25 SOLUTION, CONCENTRATE RESPIRATORY (INHALATION) at 07:45

## 2020-01-01 RX ADMIN — IPRATROPIUM BROMIDE 0.5 MG: 0.5 SOLUTION RESPIRATORY (INHALATION) at 18:54

## 2020-01-01 RX ADMIN — BUDESONIDE AND FORMOTEROL FUMARATE DIHYDRATE 2 PUFF: 160; 4.5 AEROSOL RESPIRATORY (INHALATION) at 10:08

## 2020-01-01 RX ADMIN — FUROSEMIDE 20 MG: 10 INJECTION, SOLUTION INTRAMUSCULAR; INTRAVENOUS at 08:08

## 2020-01-01 RX ADMIN — IPRATROPIUM BROMIDE 0.5 MG: 0.5 SOLUTION RESPIRATORY (INHALATION) at 07:45

## 2020-01-01 RX ADMIN — BUDESONIDE AND FORMOTEROL FUMARATE DIHYDRATE 2 PUFF: 160; 4.5 AEROSOL RESPIRATORY (INHALATION) at 22:08

## 2020-01-01 RX ADMIN — AMOXICILLIN AND CLAVULANATE POTASSIUM 1 TABLET: 875; 125 TABLET, FILM COATED ORAL at 20:04

## 2020-01-01 RX ADMIN — Medication 10 ML: at 09:54

## 2020-01-01 RX ADMIN — DILTIAZEM HYDROCHLORIDE 120 MG: 120 CAPSULE, COATED, EXTENDED RELEASE ORAL at 10:07

## 2020-01-01 RX ADMIN — FUROSEMIDE 20 MG: 10 INJECTION, SOLUTION INTRAMUSCULAR; INTRAVENOUS at 10:07

## 2020-01-01 RX ADMIN — LEVALBUTEROL HYDROCHLORIDE 1.25 MG: 1.25 SOLUTION, CONCENTRATE RESPIRATORY (INHALATION) at 11:29

## 2020-01-01 RX ADMIN — IPRATROPIUM BROMIDE 0.5 MG: 0.5 SOLUTION RESPIRATORY (INHALATION) at 20:30

## 2020-01-01 RX ADMIN — AMOXICILLIN AND CLAVULANATE POTASSIUM 1 TABLET: 875; 125 TABLET, FILM COATED ORAL at 21:39

## 2020-01-01 RX ADMIN — METHYLPREDNISOLONE SODIUM SUCCINATE 40 MG: 40 INJECTION, POWDER, FOR SOLUTION INTRAMUSCULAR; INTRAVENOUS at 18:05

## 2020-01-01 RX ADMIN — LISINOPRIL 20 MG: 20 TABLET ORAL at 08:56

## 2020-01-01 RX ADMIN — LEVALBUTEROL HYDROCHLORIDE 1.25 MG: 1.25 SOLUTION, CONCENTRATE RESPIRATORY (INHALATION) at 18:54

## 2020-01-01 RX ADMIN — IPRATROPIUM BROMIDE 0.5 MG: 0.5 SOLUTION RESPIRATORY (INHALATION) at 03:32

## 2020-01-01 RX ADMIN — IPRATROPIUM BROMIDE 0.5 MG: 0.5 SOLUTION RESPIRATORY (INHALATION) at 11:30

## 2020-01-01 RX ADMIN — Medication 10 ML: at 21:26

## 2020-01-01 RX ADMIN — Medication 10 ML: at 08:58

## 2020-01-01 RX ADMIN — LEVALBUTEROL HYDROCHLORIDE 1.25 MG: 1.25 SOLUTION, CONCENTRATE RESPIRATORY (INHALATION) at 12:05

## 2020-01-01 RX ADMIN — IPRATROPIUM BROMIDE 0.5 MG: 0.5 SOLUTION RESPIRATORY (INHALATION) at 11:35

## 2020-01-01 RX ADMIN — Medication 10 ML: at 17:37

## 2020-01-01 RX ADMIN — GUAIFENESIN 1200 MG: 600 TABLET, EXTENDED RELEASE ORAL at 15:54

## 2020-01-01 RX ADMIN — LEVALBUTEROL HYDROCHLORIDE 1.25 MG: 1.25 SOLUTION, CONCENTRATE RESPIRATORY (INHALATION) at 20:31

## 2020-01-01 RX ADMIN — LISINOPRIL 20 MG: 20 TABLET ORAL at 10:07

## 2020-01-01 RX ADMIN — LEVALBUTEROL HYDROCHLORIDE 1.25 MG: 1.25 SOLUTION, CONCENTRATE RESPIRATORY (INHALATION) at 22:03

## 2020-01-01 RX ADMIN — AMOXICILLIN AND CLAVULANATE POTASSIUM 1 TABLET: 875; 125 TABLET, FILM COATED ORAL at 12:13

## 2020-01-01 RX ADMIN — IPRATROPIUM BROMIDE 0.5 MG: 0.5 SOLUTION RESPIRATORY (INHALATION) at 15:33

## 2020-01-01 RX ADMIN — BUDESONIDE AND FORMOTEROL FUMARATE DIHYDRATE 2 PUFF: 160; 4.5 AEROSOL RESPIRATORY (INHALATION) at 21:02

## 2020-01-01 RX ADMIN — BUDESONIDE AND FORMOTEROL FUMARATE DIHYDRATE 2 PUFF: 160; 4.5 AEROSOL RESPIRATORY (INHALATION) at 21:40

## 2020-01-01 RX ADMIN — IPRATROPIUM BROMIDE 0.5 MG: 0.5 SOLUTION RESPIRATORY (INHALATION) at 07:28

## 2020-01-01 RX ADMIN — Medication 10 ML: at 20:20

## 2020-01-01 RX ADMIN — METHYLPREDNISOLONE SODIUM SUCCINATE 40 MG: 40 INJECTION, POWDER, FOR SOLUTION INTRAMUSCULAR; INTRAVENOUS at 01:25

## 2020-01-01 RX ADMIN — FUROSEMIDE 20 MG: 10 INJECTION, SOLUTION INTRAMUSCULAR; INTRAVENOUS at 09:52

## 2020-01-01 RX ADMIN — BUDESONIDE AND FORMOTEROL FUMARATE DIHYDRATE 2 PUFF: 160; 4.5 AEROSOL RESPIRATORY (INHALATION) at 21:26

## 2020-01-01 RX ADMIN — PANTOPRAZOLE SODIUM 40 MG: 40 TABLET, DELAYED RELEASE ORAL at 09:54

## 2020-01-01 RX ADMIN — PANTOPRAZOLE SODIUM 40 MG: 40 TABLET, DELAYED RELEASE ORAL at 06:15

## 2020-01-01 RX ADMIN — LEVALBUTEROL HYDROCHLORIDE 1.25 MG: 1.25 SOLUTION, CONCENTRATE RESPIRATORY (INHALATION) at 03:32

## 2020-01-01 RX ADMIN — LORAZEPAM 0.5 MG: 0.5 TABLET ORAL at 21:39

## 2020-01-01 RX ADMIN — IPRATROPIUM BROMIDE 0.5 MG: 0.5 SOLUTION RESPIRATORY (INHALATION) at 14:54

## 2020-01-01 RX ADMIN — LISINOPRIL 40 MG: 20 TABLET ORAL at 12:24

## 2020-01-01 RX ADMIN — LEVALBUTEROL 1.25 MG: 1.25 SOLUTION, CONCENTRATE RESPIRATORY (INHALATION) at 06:55

## 2020-01-01 RX ADMIN — ENOXAPARIN SODIUM 30 MG: 30 INJECTION SUBCUTANEOUS at 10:06

## 2020-01-01 RX ADMIN — ENOXAPARIN SODIUM 30 MG: 30 INJECTION SUBCUTANEOUS at 09:25

## 2020-01-01 RX ADMIN — LEVALBUTEROL 1.25 MG: 1.25 SOLUTION, CONCENTRATE RESPIRATORY (INHALATION) at 10:56

## 2020-01-01 RX ADMIN — ENOXAPARIN SODIUM 30 MG: 30 INJECTION SUBCUTANEOUS at 09:52

## 2020-01-01 RX ADMIN — BUDESONIDE AND FORMOTEROL FUMARATE DIHYDRATE 2 PUFF: 160; 4.5 AEROSOL RESPIRATORY (INHALATION) at 09:14

## 2020-01-01 RX ADMIN — LEVALBUTEROL HYDROCHLORIDE 1.25 MG: 1.25 SOLUTION, CONCENTRATE RESPIRATORY (INHALATION) at 07:27

## 2020-01-01 RX ADMIN — LEVALBUTEROL HYDROCHLORIDE 1.25 MG: 1.25 SOLUTION, CONCENTRATE RESPIRATORY (INHALATION) at 03:42

## 2020-01-01 RX ADMIN — IPRATROPIUM BROMIDE 0.5 MG: 0.5 SOLUTION RESPIRATORY (INHALATION) at 15:41

## 2020-01-01 RX ADMIN — LEVALBUTEROL HYDROCHLORIDE 1.25 MG: 1.25 SOLUTION, CONCENTRATE RESPIRATORY (INHALATION) at 15:25

## 2020-01-01 RX ADMIN — DULOXETINE 30 MG: 30 CAPSULE, DELAYED RELEASE ORAL at 08:37

## 2020-01-01 RX ADMIN — LEVALBUTEROL HYDROCHLORIDE 1.25 MG: 1.25 SOLUTION, CONCENTRATE RESPIRATORY (INHALATION) at 10:58

## 2020-01-01 RX ADMIN — PANTOPRAZOLE SODIUM 40 MG: 40 TABLET, DELAYED RELEASE ORAL at 06:32

## 2020-01-01 RX ADMIN — IPRATROPIUM BROMIDE 0.5 MG: 0.5 SOLUTION RESPIRATORY (INHALATION) at 15:16

## 2020-01-01 RX ADMIN — METHYLPREDNISOLONE SODIUM SUCCINATE 40 MG: 40 INJECTION, POWDER, FOR SOLUTION INTRAMUSCULAR; INTRAVENOUS at 17:04

## 2020-01-01 RX ADMIN — FUROSEMIDE 20 MG: 10 INJECTION, SOLUTION INTRAMUSCULAR; INTRAVENOUS at 08:38

## 2020-01-01 RX ADMIN — DULOXETINE 30 MG: 30 CAPSULE, DELAYED RELEASE ORAL at 09:52

## 2020-01-01 RX ADMIN — AMOXICILLIN AND CLAVULANATE POTASSIUM 1 TABLET: 875; 125 TABLET, FILM COATED ORAL at 09:16

## 2020-01-01 RX ADMIN — LEVALBUTEROL 1.25 MG: 1.25 SOLUTION, CONCENTRATE RESPIRATORY (INHALATION) at 15:02

## 2020-01-01 RX ADMIN — Medication 10 ML: at 10:07

## 2020-01-01 RX ADMIN — LISINOPRIL 20 MG: 20 TABLET ORAL at 08:37

## 2020-01-01 RX ADMIN — GUAIFENESIN 1200 MG: 600 TABLET, EXTENDED RELEASE ORAL at 21:39

## 2020-01-01 RX ADMIN — METHYLPREDNISOLONE SODIUM SUCCINATE 60 MG: 125 INJECTION, POWDER, FOR SOLUTION INTRAMUSCULAR; INTRAVENOUS at 09:14

## 2020-01-01 RX ADMIN — BUDESONIDE AND FORMOTEROL FUMARATE DIHYDRATE 2 PUFF: 160; 4.5 AEROSOL RESPIRATORY (INHALATION) at 09:52

## 2020-01-01 RX ADMIN — LEVOTHYROXINE SODIUM 25 MCG: 0.03 TABLET ORAL at 08:56

## 2020-01-01 RX ADMIN — LEVALBUTEROL HYDROCHLORIDE 1.25 MG: 1.25 SOLUTION, CONCENTRATE RESPIRATORY (INHALATION) at 07:08

## 2020-01-01 RX ADMIN — LEVALBUTEROL 1.25 MG: 1.25 SOLUTION, CONCENTRATE RESPIRATORY (INHALATION) at 18:56

## 2020-01-01 RX ADMIN — FUROSEMIDE 20 MG: 10 INJECTION, SOLUTION INTRAMUSCULAR; INTRAVENOUS at 08:03

## 2020-01-01 RX ADMIN — LEVALBUTEROL HYDROCHLORIDE 1.25 MG: 1.25 SOLUTION, CONCENTRATE RESPIRATORY (INHALATION) at 15:45

## 2020-01-01 RX ADMIN — IPRATROPIUM BROMIDE 0.5 MG: 0.5 SOLUTION RESPIRATORY (INHALATION) at 18:57

## 2020-01-01 RX ADMIN — AMOXICILLIN AND CLAVULANATE POTASSIUM 1 TABLET: 875; 125 TABLET, FILM COATED ORAL at 21:03

## 2020-01-01 RX ADMIN — LEVOTHYROXINE SODIUM 25 MCG: 0.03 TABLET ORAL at 10:07

## 2020-01-01 RX ADMIN — Medication 10 ML: at 08:14

## 2020-01-01 RX ADMIN — IPRATROPIUM BROMIDE 0.5 MG: 0.5 SOLUTION RESPIRATORY (INHALATION) at 15:25

## 2020-01-01 RX ADMIN — PREDNISONE 40 MG: 20 TABLET ORAL at 12:03

## 2020-01-01 RX ADMIN — DULOXETINE 30 MG: 30 CAPSULE, DELAYED RELEASE ORAL at 08:56

## 2020-01-01 RX ADMIN — DILTIAZEM HYDROCHLORIDE 120 MG: 120 CAPSULE, COATED, EXTENDED RELEASE ORAL at 08:37

## 2020-01-01 RX ADMIN — SALINE NASAL SPRAY 1 SPRAY: 1.5 SOLUTION NASAL at 10:16

## 2020-01-01 RX ADMIN — LEVALBUTEROL HYDROCHLORIDE 1.25 MG: 1.25 SOLUTION, CONCENTRATE RESPIRATORY (INHALATION) at 15:41

## 2020-01-01 RX ADMIN — AMOXICILLIN AND CLAVULANATE POTASSIUM 1 TABLET: 875; 125 TABLET, FILM COATED ORAL at 08:04

## 2020-01-01 RX ADMIN — IPRATROPIUM BROMIDE 0.5 MG: 0.5 SOLUTION RESPIRATORY (INHALATION) at 07:27

## 2020-01-01 RX ADMIN — AMOXICILLIN AND CLAVULANATE POTASSIUM 1 TABLET: 875; 125 TABLET, FILM COATED ORAL at 08:57

## 2020-01-01 RX ADMIN — METHYLPREDNISOLONE SODIUM SUCCINATE 60 MG: 125 INJECTION, POWDER, FOR SOLUTION INTRAMUSCULAR; INTRAVENOUS at 10:06

## 2020-01-01 RX ADMIN — METHYLPREDNISOLONE SODIUM SUCCINATE 60 MG: 125 INJECTION, POWDER, FOR SOLUTION INTRAMUSCULAR; INTRAVENOUS at 04:34

## 2020-01-01 RX ADMIN — Medication 10 ML: at 09:25

## 2020-01-01 RX ADMIN — AMOXICILLIN AND CLAVULANATE POTASSIUM 1 TABLET: 875; 125 TABLET, FILM COATED ORAL at 09:55

## 2020-01-01 RX ADMIN — LEVALBUTEROL HYDROCHLORIDE 1.25 MG: 1.25 SOLUTION, CONCENTRATE RESPIRATORY (INHALATION) at 23:37

## 2020-01-01 RX ADMIN — ENOXAPARIN SODIUM 30 MG: 30 INJECTION SUBCUTANEOUS at 08:08

## 2020-01-01 RX ADMIN — IPRATROPIUM BROMIDE 0.5 MG: 0.5 SOLUTION RESPIRATORY (INHALATION) at 04:05

## 2020-01-01 RX ADMIN — LEVALBUTEROL HYDROCHLORIDE 1.25 MG: 1.25 SOLUTION, CONCENTRATE RESPIRATORY (INHALATION) at 23:15

## 2020-01-01 RX ADMIN — Medication 10 ML: at 08:03

## 2020-01-01 RX ADMIN — METHYLPREDNISOLONE SODIUM SUCCINATE 40 MG: 40 INJECTION, POWDER, FOR SOLUTION INTRAMUSCULAR; INTRAVENOUS at 09:52

## 2020-01-01 RX ADMIN — BUDESONIDE AND FORMOTEROL FUMARATE DIHYDRATE 2 PUFF: 160; 4.5 AEROSOL RESPIRATORY (INHALATION) at 08:58

## 2020-01-01 RX ADMIN — IPRATROPIUM BROMIDE 0.5 MG: 0.5 SOLUTION RESPIRATORY (INHALATION) at 19:08

## 2020-01-01 RX ADMIN — ENOXAPARIN SODIUM 30 MG: 30 INJECTION SUBCUTANEOUS at 09:15

## 2020-01-01 RX ADMIN — ENOXAPARIN SODIUM 30 MG: 30 INJECTION SUBCUTANEOUS at 23:03

## 2020-01-01 RX ADMIN — ENOXAPARIN SODIUM 30 MG: 30 INJECTION SUBCUTANEOUS at 21:51

## 2020-01-01 RX ADMIN — LEVALBUTEROL HYDROCHLORIDE 1.25 MG: 1.25 SOLUTION, CONCENTRATE RESPIRATORY (INHALATION) at 04:05

## 2020-01-01 ASSESSMENT — ENCOUNTER SYMPTOMS
SHORTNESS OF BREATH: 1
CHEST TIGHTNESS: 1
SHORTNESS OF BREATH: 1
ALLERGIC/IMMUNOLOGIC NEGATIVE: 1
DIARRHEA: 0
ABDOMINAL PAIN: 0
SHORTNESS OF BREATH: 1
RHINORRHEA: 0
SHORTNESS OF BREATH: 1
COUGH: 1
CHEST TIGHTNESS: 0
DIARRHEA: 0
RHINORRHEA: 0
SHORTNESS OF BREATH: 1
TROUBLE SWALLOWING: 0
COUGH: 1
BLOOD IN STOOL: 0
CHEST TIGHTNESS: 1
BACK PAIN: 0
NAUSEA: 0
DIARRHEA: 0
COUGH: 1
VOMITING: 0
SHORTNESS OF BREATH: 1
NAUSEA: 0
DIARRHEA: 0
DIARRHEA: 0
COUGH: 1
VOMITING: 0
CONSTIPATION: 0
COUGH: 1
VOMITING: 0
EYE ITCHING: 0
APNEA: 0
CONSTIPATION: 1
SHORTNESS OF BREATH: 1
ABDOMINAL PAIN: 0
ABDOMINAL DISTENTION: 0
ABDOMINAL PAIN: 0
COUGH: 1
ABDOMINAL DISTENTION: 0
SORE THROAT: 0
DIARRHEA: 0
DIARRHEA: 0
NAUSEA: 0
NAUSEA: 0
CONSTIPATION: 1
COLOR CHANGE: 0
FACIAL SWELLING: 0
ABDOMINAL PAIN: 0
CONSTIPATION: 0
COUGH: 0
VOMITING: 0
VOMITING: 0
BLOOD IN STOOL: 0
ABDOMINAL DISTENTION: 0
ABDOMINAL PAIN: 0
SHORTNESS OF BREATH: 1
SHORTNESS OF BREATH: 1
RHINORRHEA: 0
NAUSEA: 0
ABDOMINAL PAIN: 0
WHEEZING: 0
SHORTNESS OF BREATH: 1
CONSTIPATION: 0
EYE PAIN: 0
ABDOMINAL DISTENTION: 0
CONSTIPATION: 0
VOMITING: 0
EYE DISCHARGE: 0
WHEEZING: 0
COUGH: 1
COUGH: 1
BACK PAIN: 1
EYES NEGATIVE: 1
SHORTNESS OF BREATH: 1
SINUS PAIN: 0
ABDOMINAL PAIN: 0
DIARRHEA: 0
COUGH: 1
ABDOMINAL PAIN: 0
NAUSEA: 0
NAUSEA: 0
COLOR CHANGE: 0
ABDOMINAL PAIN: 0
VOICE CHANGE: 0
ABDOMINAL DISTENTION: 0
VOMITING: 0
DIARRHEA: 0
CONSTIPATION: 0
VOMITING: 0
VOMITING: 0
BACK PAIN: 0
EYE ITCHING: 0
ABDOMINAL DISTENTION: 0
CONSTIPATION: 0
ABDOMINAL DISTENTION: 0
GASTROINTESTINAL NEGATIVE: 1
NAUSEA: 0
ROS SKIN COMMENTS: LESION ON FOREHEAD
CONSTIPATION: 0
ABDOMINAL DISTENTION: 0
NAUSEA: 0
ABDOMINAL PAIN: 1
ABDOMINAL DISTENTION: 0
CONSTIPATION: 0
DIARRHEA: 0

## 2020-01-01 ASSESSMENT — PAIN DESCRIPTION - ORIENTATION
ORIENTATION: RIGHT
ORIENTATION: RIGHT

## 2020-01-01 ASSESSMENT — PAIN SCALES - GENERAL
PAINLEVEL_OUTOF10: 2
PAINLEVEL_OUTOF10: 0
PAINLEVEL_OUTOF10: 5
PAINLEVEL_OUTOF10: 0
PAINLEVEL_OUTOF10: 4
PAINLEVEL_OUTOF10: 6

## 2020-01-01 ASSESSMENT — PAIN DESCRIPTION - DESCRIPTORS
DESCRIPTORS: DULL
DESCRIPTORS: ACHING
DESCRIPTORS: CONSTANT

## 2020-01-01 ASSESSMENT — PAIN DESCRIPTION - LOCATION
LOCATION: LEG
LOCATION: LEG
LOCATION: GENERALIZED

## 2020-01-01 ASSESSMENT — PAIN DESCRIPTION - PAIN TYPE: TYPE: CHRONIC PAIN

## 2020-01-01 ASSESSMENT — PATIENT HEALTH QUESTIONNAIRE - PHQ9
2. FEELING DOWN, DEPRESSED OR HOPELESS: 0
SUM OF ALL RESPONSES TO PHQ QUESTIONS 1-9: 0
1. LITTLE INTEREST OR PLEASURE IN DOING THINGS: 0
SUM OF ALL RESPONSES TO PHQ9 QUESTIONS 1 & 2: 0
SUM OF ALL RESPONSES TO PHQ QUESTIONS 1-9: 0

## 2020-01-01 ASSESSMENT — PAIN DESCRIPTION - FREQUENCY: FREQUENCY: CONTINUOUS

## 2020-02-18 ENCOUNTER — TELEPHONE (OUTPATIENT)
Dept: INTERNAL MEDICINE CLINIC | Age: 65
End: 2020-02-18

## 2020-02-18 NOTE — TELEPHONE ENCOUNTER
Not likely related to medication. Recommend he take Lasix 40 mg tomorrow morning, elevate the legs, follow up this week if worsening.

## 2020-03-17 RX ORDER — IBUPROFEN 800 MG/1
800 TABLET ORAL 2 TIMES DAILY
Qty: 60 TABLET | Refills: 2 | Status: SHIPPED | OUTPATIENT
Start: 2020-03-17 | End: 2020-01-01 | Stop reason: SDUPTHER

## 2020-03-17 RX ORDER — PANTOPRAZOLE SODIUM 20 MG/1
40 TABLET, DELAYED RELEASE ORAL DAILY
Qty: 30 TABLET | Refills: 3 | Status: SHIPPED | OUTPATIENT
Start: 2020-03-17 | End: 2020-04-30 | Stop reason: SDUPTHER

## 2020-03-25 ENCOUNTER — TELEPHONE (OUTPATIENT)
Dept: INTERNAL MEDICINE CLINIC | Age: 65
End: 2020-03-25

## 2020-03-25 NOTE — TELEPHONE ENCOUNTER
Patient is on 2 BP medications. . Norvasc and Lotensin. Since he has lost some weight by cutting out his sugar. . his BP has been running lower. BP  Running upper 90's over mid 50's. The highest its been was 126/64 and that's very seldom. He wants to know if he can cut back on his BP medication to see if his BP comes up a little and stabilizes? Please advise. He is unable to come in due to his lung disease and the virous situation.

## 2020-04-30 ENCOUNTER — TELEPHONE (OUTPATIENT)
Dept: INTERNAL MEDICINE CLINIC | Age: 65
End: 2020-04-30

## 2020-04-30 RX ORDER — PANTOPRAZOLE SODIUM 40 MG/1
40 TABLET, DELAYED RELEASE ORAL DAILY
Qty: 30 TABLET | Refills: 2 | Status: SHIPPED | OUTPATIENT
Start: 2020-04-30 | End: 2020-01-01

## 2020-06-12 RX ORDER — BENAZEPRIL HYDROCHLORIDE 40 MG/1
TABLET, FILM COATED ORAL
Qty: 90 TABLET | Refills: 3 | Status: ON HOLD | OUTPATIENT
Start: 2020-06-12 | End: 2020-01-01 | Stop reason: HOSPADM

## 2020-06-12 RX ORDER — AMLODIPINE BESYLATE 10 MG/1
TABLET ORAL
Qty: 90 TABLET | Refills: 3 | Status: SHIPPED | OUTPATIENT
Start: 2020-06-12 | End: 2020-01-01 | Stop reason: DRUGHIGH

## 2020-06-16 RX ORDER — SUCRALFATE 1 G/1
TABLET ORAL
Qty: 270 TABLET | Refills: 3 | Status: SHIPPED | OUTPATIENT
Start: 2020-06-16 | End: 2020-01-01

## 2020-06-16 NOTE — TELEPHONE ENCOUNTER
Medication: Carafate  Last visit: 11/4/19  Next visit: Visit date not found  Last refill: 5/21/19  Pharmacy: [facility]

## 2020-07-31 NOTE — ED NOTES
Pt states wife and daughter are on their way to  pt. Pt given new urinal. Pt denies any other needs.       [facility]  07/31/20 0228

## 2020-07-31 NOTE — ED PROVIDER NOTES
[address]  eMERGENCY dEPARTMENT eNCOUnter      Pt Name: Edward Carpio  MRN: [id number]  [place] 1955  Date of evaluation: 7/30/20      CHIEF COMPLAINT       Chief Complaint   Patient presents with    Laceration     right lower leg         HISTORY OF PRESENT ILLNESS    Edward Carpio is a 60 y.o. male who presents complaining of right lower leg laceration skin tear he was laying down his dog jumped on him and his paw caught him on the right lower leg causing a skin tear and laceration to the right lower extremity. He arrives via EMS  The history is provided by the patient. Laceration   Location:  Leg  Leg laceration location:  R lower leg  Depth: Through underlying tissue  Quality: avulsion and jagged    Bleeding: venous    Injury mechanism: dogs paw cut right lower leg. Pain details:     Quality:  Aching    Severity:  No pain    Timing:  Constant  Foreign body present:  No foreign bodies  Relieved by:  Nothing  Worsened by:  Nothing  Ineffective treatments:  None tried  Tetanus status:  Out of date      REVIEW OF SYSTEMS       Review of Systems   Skin: Positive for wound. All other systems reviewed and are negative. PAST MEDICAL HISTORY     Past Medical History:   Diagnosis Date    Depressive disorder, not elsewhere classified     Impotence of organic origin     Insomnia, unspecified     Myopathy, unspecified     Postinflammatory pulmonary fibrosis (HCC)     Unspecified essential hypertension     Unspecified hypothyroidism        SURGICAL HISTORY       Past Surgical History:   Procedure Laterality Date    LUNG BIOPSY Left        CURRENT MEDICATIONS       Previous Medications    ALBUTEROL (PROVENTIL) (2.5 MG/3ML) 0.083% NEBULIZER SOLUTION    USE 1 AMPULE IN NEBULIZER FOUR TIMES A DAY AS NEEDED.     AMLODIPINE (NORVASC) 10 MG TABLET    TAKE 1 TABLET BY MOUTH ONCE DAILY    BENAZEPRIL (LOTENSIN) 40 MG TABLET    TAKE 1 TABLET BY MOUTH ONCE DAILY    BUDESONIDE-FORMOTEROL (SYMBICORT) 160-4.5 MCG/ACT AERO    Inhale 2 puffs into the lungs 2 times daily    BUSPIRONE (BUSPAR) 5 MG TABLET    TAKE 1 TABLET BY MOUTH THREE TIMES DAILY    CALCIUM CARBONATE-VITAMIN D (OYSTER SHELL CALCIUM/D) 500-200 MG-UNIT TABS    Take 1 tablet by mouth daily    DULOXETINE (CYMBALTA) 30 MG CAPSULE    Take 30 mg by mouth daily     FUROSEMIDE (LASIX) 20 MG TABLET    TAKE ONE TABLET BY MOUTH ONCE DAILY    IBUPROFEN (ADVIL;MOTRIN) 800 MG TABLET    Take 1 tablet by mouth 2 times daily    LEVOTHYROXINE (SYNTHROID) 25 MCG TABLET    TAKE 1 TABLET BY MOUTH ONCE DAILY    PANTOPRAZOLE (PROTONIX) 40 MG TABLET    Take 1 tablet by mouth once daily    PREDNISONE (DELTASONE) 20 MG TABLET    Take 20 mg by mouth 2 times daily     PROAIR  (90 BASE) MCG/ACT INHALER        SILDENAFIL (VIAGRA) 100 MG TABLET    Take 1 tablet by mouth as needed for Erectile Dysfunction    SODIUM CHLORIDE (ALTAMIST SPRAY) 0.65 % NASAL SPRAY    1 spray by Nasal route as needed for Congestion. SUCRALFATE (CARAFATE) 1 GM TABLET    TAKE 1 TABLET BY MOUTH THREE TIMES DAILY    ZOLPIDEM TARTRATE PO    Take by mouth Taking 12.5mg at night       ALLERGIES     has No Known Allergies. FAMILY HISTORY     He indicated that his mother is . He indicated that his father is . He indicated that the status of his sister is unknown. He indicated that the status of his other is unknown. SOCIAL HISTORY      reports that he quit smoking about 15 years ago. He has a 3.75 pack-year smoking history. He has never used smokeless tobacco. He reports current alcohol use. He reports that he does not use drugs. PHYSICAL EXAM     INITIAL VITALS: BP (!) 137/92   Pulse 86   Temp 98.4 °F (36.9 °C) (Oral)   Resp 16   Ht 6' 8\" (2.032 m)   Wt 280 lb (127 kg)   SpO2 97%   BMI 30.76 kg/m²      Physical Exam  Vitals signs and nursing note reviewed. Constitutional:       Appearance: He is well-developed. HENT:      Head: Normocephalic and atraumatic. (AUGMENTIN) 500-125 MG per tablet     Sig: Take 1 tablet by mouth 2 times daily for 10 days     Dispense:  20 tablet     Refill:  0    amoxicillin-clavulanate (AUGMENTIN) 875-125 MG per tablet 1 tablet       -------------------------      CRITICAL CARE:    CONSULTS:  None    PROCEDURES:  Lac Repair    Date/Time: 7/30/2020 11:20 PM  Performed by: Antelmo Johnson PA-C  Authorized by: Pooja Valdez MD     Consent:     Consent obtained:  Verbal    Consent given by:  Patient    Risks discussed:  Infection, need for additional repair, poor cosmetic result and poor wound healing  Anesthesia (see MAR for exact dosages): Anesthesia method:  Local infiltration    Local anesthetic:  Lidocaine 1% w/o epi  Laceration details:     Location:  Leg    Leg location:  R lower leg    Length (cm):  7    Depth (mm):  4  Repair type:     Repair type:  Simple  Pre-procedure details:     Preparation:  Patient was prepped and draped in usual sterile fashion  Exploration:     Hemostasis achieved with:  Direct pressure    Wound exploration: wound explored through full range of motion and entire depth of wound probed and visualized      Contaminated: no    Treatment:     Area cleansed with:  Betadine    Amount of cleaning:  Standard    Irrigation solution:  Sterile saline    Irrigation method:  Pressure wash    Visualized foreign bodies/material removed: yes    Skin repair:     Repair method:  Sutures    Suture size:  3-0    Suture material:  Nylon    Suture technique:  Simple interrupted    Number of sutures:  13  Approximation:     Approximation:  Close  Post-procedure details:     Dressing:  Antibiotic ointment and bulky dressing    Patient tolerance of procedure: Tolerated well, no immediate complications        FINAL IMPRESSION      1.  Skin tear of right lower leg without complication, initial encounter          DISPOSITION/PLAN   DISPOSITION Decision To Discharge 07/30/2020 11:08:07 PM      PATIENT REFERREDTO:  Dara Henderson

## 2020-07-31 NOTE — ED NOTES
Bed: 07A  Expected date:   Expected time:   Means of arrival:   Comments:  [facility]  07/30/20 2103

## 2020-08-01 NOTE — ED PROVIDER NOTES
[address] [place] [address]     Pt Name: Vianey Collins  MRN: [id number]  [place] 1955  Date of evaluation: 7/31/20     Vianey Collins is a 60 y.o. male with CC: Laceration (right lower leg)      I was personally available for consultation in the Emergency Department.     Shin Livingston MD  Attending Emergency Physician                   Shin Livingston MD  07/31/20 0693

## 2020-08-03 PROBLEM — F33.42 MAJOR DEPRESSIVE DISORDER, RECURRENT, IN FULL REMISSION (HCC): Status: ACTIVE | Noted: 2020-01-01

## 2020-08-03 PROBLEM — I48.91 ATRIAL FIBRILLATION (HCC): Status: ACTIVE | Noted: 2020-01-01

## 2020-08-03 NOTE — ED NOTES
Bed: E  Expected date:   Expected time:   Means of arrival:   Comments:  [facility] [address], [address] [facility]  08/03/20 2149

## 2020-08-03 NOTE — ED NOTES
Spoke with patient's wife (richa) about patient's admission and room number. This RN did inform patient's wife about visitation rules.       Azucena Rodriges RN  08/03/20 2011

## 2020-08-03 NOTE — PROGRESS NOTES
Bedside reporting done, per Yumiko Velazco and Salazar RN's  O 2  Cont. On per NC @  4L/minNC  Pt. Has no complaints at this time  Telemetry cont. On pt  Pt cont.  To watch TV  No SOB noted at this time  Pt is alert and Oriented

## 2020-08-03 NOTE — PROGRESS NOTES
[address] [address]  Dept: [phone]  Dept Fax: [phone]    OfficeProgress/Follow Up Note  Date of patient's visit: 8/3/2020  Patient's Name:  Sachi Fajardo YOB: 1955            Patient Care Team:  Kaiden Russell PA-C as PCP - General (Physician Assistant)  Kaiden Russell PA-C as PCP - [facility] Empaneled Provider    REASON FOR VISIT:  Post hospital/ED visit    HISTORY OF PRESENT ILLNESS:      Chief Complaint   Patient presents with   [facility] ED Follow-up     [facility] 7/30 for skin tear right lower leg, 13 stitches, wound check    Foot Swelling     for about 3 weeks     Other     low blood pressure, had CT chest done 6/10 [facility]     TSH     History was obtained from the patient. Sachi Fajardo is a 60 y.o. male here today for above. Emergency room follow up. Patient was seen due to laceration right lower extremity, scratched by dog, sutures placed, started on Augmentin. Patient complains of bilateral lower extremity swelling over past few weeks, has improved this morning. Chronic dyspnea, respiratory failure with hypoxemia on oxygen at all times. Patient noted to have irregular heart rhythm on exam, EKG reveals atrial fibrillation delayed ventricular conduction. He reports increasing anxiety. No chest pain/pressure, palpitations, lightheadedness. Blood pressure reportedly running low at home. No headaches, weakness, numbness, vision or speech change.      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 ([address]    Major depressive disorder, recurrent, in full remission [facility] Maintenance Due   Topic Date Due    Hepatitis C screen  1955    HIV screen  07/05/1971    Shingles Vaccine (1 of 2) 07/05/2006    TSH testing  11/03/2017     MEDICATIONS:      Current Outpatient Medications   Medication Sig Dispense Refill    amLODIPine (NORVASC) 5 MG tablet TAKE 1 TABLET BY MOUTH ONCE DAILY 30 tablet 2    amoxicillin-clavulanate (AUGMENTIN) 500-125 MG per tablet Take 1 tablet by mouth 2 times daily for 10 days 20 tablet 0    pantoprazole (PROTONIX) 40 MG tablet Take 1 tablet by mouth once daily 30 tablet 0    benazepril (LOTENSIN) 40 MG tablet TAKE 1 TABLET BY MOUTH ONCE DAILY 90 tablet 3    ibuprofen (ADVIL;MOTRIN) 800 MG tablet Take 1 tablet by mouth 2 times daily 60 tablet 2    levothyroxine (SYNTHROID) 25 MCG tablet TAKE 1 TABLET BY MOUTH ONCE DAILY 90 tablet 3    albuterol (PROVENTIL) (2.5 MG/3ML) 0.083% nebulizer solution USE 1 AMPULE IN NEBULIZER FOUR TIMES A DAY AS NEEDED.  5    furosemide (LASIX) 20 MG tablet TAKE ONE TABLET BY MOUTH ONCE DAILY 90 tablet 3    ZOLPIDEM TARTRATE PO Take by mouth Taking 12.5mg at night      sildenafil (VIAGRA) 100 MG tablet Take 1 tablet by mouth as needed for Erectile Dysfunction 30 tablet 3    predniSONE (DELTASONE) 20 MG tablet Take 20 mg by mouth 2 times daily       sodium chloride (ALTAMIST SPRAY) 0.65 % nasal spray 1 spray by Nasal route as needed for Congestion.  1 Bottle 3    DULoxetine (CYMBALTA) 30 MG capsule Take 30 mg by mouth daily   0    sucralfate (CARAFATE) 1 GM tablet TAKE 1 TABLET BY MOUTH THREE TIMES DAILY (Patient not taking: Reported on 8/3/2020) 270 tablet 3    budesonide-formoterol (SYMBICORT) 160-4.5 MCG/ACT AERO Inhale 2 puffs into the lungs 2 times daily      Calcium Carbonate-Vitamin D (OYSTER SHELL CALCIUM/D) 500-200 MG-UNIT TABS Take 1 tablet by mouth daily (Patient not taking: Reported on 8/3/2020) 30 tablet 0    PROAIR  (90 BASE) MCG/ACT inhaler        No current facility-administered medications Readings from Last 3 Encounters:   08/03/20 (!) 100/58   07/30/20 (!) 141/85   11/04/19 122/74      Wt Readings from Last 3 Encounters:   08/03/20 259 lb (117.5 kg)   07/30/20 280 lb (127 kg)   11/04/19 282 lb (127.9 kg)     General - alert, well appearing, and in no distress  Skin - normal coloration and turgor, no rash  Eyes - pupils equal and reactive, extraocular eye movements intact  Mouth - mucous membranes are moist, pharynx normal without lesions  Neck - supple, no significant adenopathy, no palpable masses   Lymphatics - no palpable lymphadenopathy, no hepatosplenomegaly  Chest - inspiratory crackles bilateraly, no wheezes, rales or rhonchi, symmetric air entry, hypoxia with spO2 84-86%, declined to 56% during exam, oxygen supply depleted, started on replacement with improvement to baseline   Heart - rate is regular, rhythm is irregularly irregular  Abdomen - soft, nontender, non distended, large hernia   Neurological - alert, oriented x 3, normal speech, no focal sensory or motor deficit  Musculoskeletal - laceration right lower extremity status post closure, leg is non tender, mildly erythema, no warmth  Extremities - peripheral pulses normal, trace pedal edema, no calf tenderness     LABORATORY FINDINGS:    CBC:  Lab Results   Component Value Date    WBC 10.5 10/31/2019    HGB 15.6 10/31/2019     10/31/2019     12/28/2011     BMP:    Lab Results   Component Value Date     10/31/2019    K 4.4 10/31/2019     10/31/2019    CO2 30 10/31/2019    BUN 16 10/31/2019    CREATININE 0.91 10/31/2019    GLUCOSE 112 10/31/2019    GLUCOSE 157 12/28/2011     HEMOGLOBIN A1C:   Lab Results   Component Value Date    LABA1C 5.8 10/31/2019     FASTING LIPID PANEL:  Lab Results   Component Value Date    CHOL 175 10/31/2019    HDL 66 10/31/2019    TRIG 73 10/31/2019     ASSESSMENT AND PLAN:       Diagnosis Orders   1. Essential hypertension  amLODIPine (NORVASC) 5 MG tablet   2.  Postinflammatory pulmonary fibrosis ([facility]     3. Laceration of right lower extremity, subsequent encounter     4. Hypothyroidism, unspecified type  TSH without Reflex   5. Atrial fibrillation, new onset (HCC)  EKG 12 lead   6. Hypoxia     7. Major depressive disorder, recurrent, in full remission [facility])       Patient with newly diagnosed atrial fibrillation, EMS called to office due to hypoxia (spO2 56%) with no replacement oxygen available at the time, daughter arrived with replacement and saturations improved, recommend presentation to hospital, consider admission and cardiologist consultation. FOLLOW UP AND INSTRUCTIONS:   Return upon hospital discharge follow up. Radha Ray PA-C   [facility]  8/3/2020, 12:34 PM    Please note that this chart was generated using voice recognition Dragon dictation software. Although everyeffort was made to ensure the accuracy of this automated transcription, some errors in transcription may have occurred.

## 2020-08-03 NOTE — H&P
[address]      [address] [facility]     HISTORY AND PHYSICAL EXAMINATION            Date:   8/4/2020  Patient name:  Shade Parra  Date of admission:  8/3/2020 12:33 PM  MRN:   [id number]  Account:  [de-identified]  YOB: 1955  PCP:    Marry Diaz PA-C  Room:   [address] [place] [address]  Code Status:    Full Code    Chief Complaint:     Chief Complaint   Patient presents with    Shortness of Breath       History Obtained From:     patient    History of Present Illness: The patient is a 60 y.o. Non-/non  male with a PMHx of pulmonary fibrosis and silicosis with chronic respiratory failure on home oxygen, HTN, hypothyroidism, and major depression who presented to the hospital after an episode of hypoxemia and possible new onset atrial fibrillation during his outpatient PCP visit this morning. Per patient, he had an outpatient visit for wound check for a dog scratch on his right lower extremity. During this visit, an EKG was done due to a \"finding regarding my heart on a CT a few months ago\" which was done at [address] [place] [address] on 6/10. EKG revealed atrial fibrillation with delayed ventricular conduction. During this visit patient also had worsening SOB and his O2 saturation declined to 56% on exam because he ran out of oxygen and at this time EMS was called. His daughter brought a replacement tank and his shortness of breath improved and patient was taken to ED at [facility]. In ED patient is on 4L O2 via nasal cannula and feels much improved. He says he has had worsening shortness of breath over the past few weeks that he associates with his chronic pulmonary condition. He notes increased swelling of his lower extremities bilaterally. Endorses orthopnea but says this is also normal for him and unchanged over the past few weeks.  He has also had worsening depression/anxiety over the past year and feel his meds no longer are effective but denies any suicidal or homicidal ideation. In the ED a CXR was done which showed cardiomegaly and extensive bilateral airspace disease. Repeat ECG showed sinus rhythm with frequent PACs and PVCs. HgB, TSH, troponins x 2 were all within normal limits. Past Medical History:     Past Medical History:   Diagnosis Date    Depressive disorder, not elsewhere classified     Impotence of organic origin     Insomnia, unspecified     Myopathy, unspecified     Postinflammatory pulmonary fibrosis (HCC)     Unspecified essential hypertension     Unspecified hypothyroidism         Past SurgicalHistory:     Past Surgical History:   Procedure Laterality Date    LUNG BIOPSY Left         Medications Prior to Admission:        Prior to Admission medications    Medication Sig Start Date End Date Taking? Authorizing Provider   amLODIPine (NORVASC) 5 MG tablet TAKE 1 TABLET BY MOUTH ONCE DAILY 8/3/20  Yes Kriss Lang PA-C   furosemide (LASIX) 20 MG tablet Take 20 mg by mouth daily as needed   Yes Historical Provider, MD   zolpidem (AMBIEN CR) 12.5 MG extended release tablet Take 12.5 mg by mouth nightly as needed for Sleep.    Yes Historical Provider, MD   amoxicillin-clavulanate (AUGMENTIN) 500-125 MG per tablet Take 1 tablet by mouth 2 times daily for 10 days 7/30/20 8/9/20 Yes Edvin Patel PA-C   pantoprazole (PROTONIX) 40 MG tablet Take 1 tablet by mouth once daily 7/13/20  Yes Bree Sharpe MD   benazepril (LOTENSIN) 40 MG tablet TAKE 1 TABLET BY MOUTH ONCE DAILY 6/12/20  Yes Kriss Lang PA-C   ibuprofen (ADVIL;MOTRIN) 800 MG tablet Take 1 tablet by mouth 2 times daily 3/17/20  Yes Kriss Lang PA-C   levothyroxine (SYNTHROID) 25 MCG tablet TAKE 1 TABLET BY MOUTH ONCE DAILY 8/5/19  Yes Bryan Long MD   albuterol (PROVENTIL) (2.5 MG/3ML) 0.083% nebulizer solution Take 2.5 mg by nebulization every 6 hours as needed  3/15/19  Yes Historical Provider, MD   PROAIR  (90 BASE) MCG/ACT inhaler Inhale 2 puffs into the lungs every 6 hours as needed for Wheezing  5/5/16  Yes Historical Provider, MD   predniSONE (DELTASONE) 10 MG tablet Take 10 mg by mouth daily    Yes Historical Provider, MD   sodium chloride (ALTAMIST SPRAY) 0.65 % nasal spray 1 spray by Nasal route as needed for Congestion. 2/19/15  Yes Eyad Silva MD   DULoxetine (CYMBALTA) 30 MG capsule Take 30 mg by mouth daily  2/10/15  Yes Historical Provider, MD        Allergies:     Patient has no known allergies. Social History:     Tobacco:    reports that he quit smoking about 15 years ago. He has a 3.75 pack-year smoking history. He has never used smokeless tobacco.  Alcohol:      reports current alcohol use. Drug Use:  reports no history of drug use. Family History:     Family History   Problem Relation Age of Onset    High Blood Pressure Mother     Cancer Mother     Stroke Mother     High Blood Pressure Father     Cancer Sister     Heart Disease Other         Family in general       Review of Systems:     Positive and Negative as described in HPI. Review of Systems   Constitutional: Positive for unexpected weight change (Recent weight loss of 20 lb he attributes to healthier eating.). Negative for activity change, appetite change, chills and fever. HENT: Negative for congestion and ear pain. Eyes: Negative for itching. Respiratory: Positive for cough, chest tightness and shortness of breath. Cardiovascular: Positive for palpitations (occassionally) and leg swelling (for the past 3 weeks. ). Negative for chest pain. Gastrointestinal: Positive for constipation. Negative for abdominal distention, abdominal pain, blood in stool, diarrhea, nausea and vomiting. Genitourinary: Negative for difficulty urinating, flank pain and hematuria. Musculoskeletal: Negative for arthralgias and joint swelling.    Skin: Positive for wound (On RLE from dog scratch, s/p closure. ). Neurological: Negative for syncope, numbness and headaches. Psychiatric/Behavioral: Positive for dysphoric mood. Negative for suicidal ideas. The patient is nervous/anxious. Physical Exam:   /78   Pulse 91   Temp 97.6 °F (36.4 °C) (Oral)   Resp 16   Ht 6' 8\" (2.032 m)   Wt 260 lb 9.3 oz (118.2 kg)   SpO2 91%   BMI 28.63 kg/m²   Temp (24hrs), Av.7 °F (36.5 °C), Min:97.6 °F (36.4 °C), Max:97.9 °F (36.6 °C)    No results for input(s): POCGLU in the last 72 hours. Intake/Output Summary (Last 24 hours) at 2020 1831  Last data filed at 2020 1746  Gross per 24 hour   Intake 225 ml   Output 2600 ml   Net -2375 ml       Physical Exam  Constitutional:       General: He is awake. Interventions: Nasal cannula in place. HENT:      Head: Normocephalic and atraumatic. Nose: Nose normal.      Mouth/Throat:      Mouth: Mucous membranes are moist.   Eyes:      Extraocular Movements: Extraocular movements intact. Conjunctiva/sclera: Conjunctivae normal.      Pupils: Pupils are equal, round, and reactive to light. Neck:      Musculoskeletal: Neck supple. Vascular: No carotid bruit or JVD. Cardiovascular:      Rate and Rhythm: Normal rate. Rhythm irregular. Pulses: Normal pulses. Heart sounds: No murmur. Pulmonary:      Effort: Pulmonary effort is normal.      Comments: Respirations are symmetric bilaterally. Inspiratory crackles heard at the lung bases bilaterally. Tolerating 4L of O2 on nasal cannula. Abdominal:      General: Bowel sounds are normal.      Palpations: Abdomen is soft. Tenderness: There is no abdominal tenderness. Comments: Large umbilical hernia present. Musculoskeletal: Normal range of motion. Right lower le+ Pitting Edema present. Left lower le+ Pitting Edema (Pitting edema present above the ankles bilaterally.) present. Skin:     Findings: Acne:  On the right lower extremity is a wrapped wound from previous dog scratch with some mild erythema. Neurological:      General: No focal deficit present. Mental Status: He is alert and oriented to person, place, and time. Psychiatric:         Behavior: Behavior is cooperative.          Investigations:     Laboratory Testing:  Recent Results (from the past 24 hour(s))   Basic Metabolic Panel w/ Reflex to MG    Collection Time: 08/04/20  4:54 AM   Result Value Ref Range    Glucose 94 70 - 99 mg/dL    BUN 11 8 - 23 mg/dL    CREATININE 0.94 0.70 - 1.20 mg/dL    Bun/Cre Ratio NOT REPORTED 9 - 20    Calcium 8.9 8.6 - 10.4 mg/dL    Sodium 145 (H) 135 - 144 mmol/L    Potassium 4.6 3.7 - 5.3 mmol/L    Chloride 102 98 - 107 mmol/L    CO2 37 (H) 20 - 31 mmol/L    Anion Gap 6 (L) 9 - 17 mmol/L    GFR Non-African American >60 >60 mL/min    GFR African American >60 >60 mL/min    GFR Comment          GFR Staging NOT REPORTED    CBC auto differential    Collection Time: 08/04/20  4:54 AM   Result Value Ref Range    WBC 8.7 3.5 - 11.0 k/uL    RBC 4.82 4.5 - 5.9 m/uL    Hemoglobin 14.1 13.5 - 17.5 g/dL    Hematocrit 44.8 41 - 53 %    MCV 93.0 80 - 100 fL    MCH 29.4 26 - 34 pg    MCHC 31.6 31 - 37 g/dL    RDW 15.7 (H) 11.5 - 14.9 %    Platelets 035 648 - 479 k/uL    MPV 9.0 6.0 - 12.0 fL    NRBC Automated NOT REPORTED per 100 WBC    Differential Type NOT REPORTED     Immature Granulocytes NOT REPORTED 0 %    Absolute Immature Granulocyte NOT REPORTED 0.00 - 0.30 k/uL    WBC Morphology NOT REPORTED     RBC Morphology NOT REPORTED     Platelet Estimate NOT REPORTED     Seg Neutrophils 69 (H) 36 - 66 %    Lymphocytes 12 (L) 24 - 44 %    Monocytes 11 (H) 1 - 7 %    Eosinophils % 7 (H) 0 - 4 %    Basophils 1 0 - 2 %    Segs Absolute 6.00 1.3 - 9.1 k/uL    Absolute Lymph # 1.10 1.0 - 4.8 k/uL    Absolute Mono # 1.00 0.1 - 1.3 k/uL    Absolute Eos # 0.60 (H) 0.0 - 0.4 k/uL    Basophils Absolute 0.10 0.0 - 0.2 k/uL       Imaging/Diagnostics:  Xr Chest Portable    Result Date: 8/3/2020  EXAMINATION: ONE X-RAY VIEW OF THE CHEST 8/3/2020 1:14 pm COMPARISON: December 26, 2011 HISTORY: ORDERING SYSTEM PROVIDED HISTORY: SOB TECHNOLOGIST PROVIDED HISTORY: SOB Reason for Exam: Patient states SOB 2 days Acuity: Acute Type of Exam: Initial FINDINGS: Extensive multifocal bilateral interstitial and airspace disease confluent throughout the mid and lower lungs. The heart is enlarged. The aorta is not well defined. Cardiomegaly with extensive bilateral airspace disease representing any form of edema, ARDS, or multifocal infection. Poorly defined aortic arch. While this likely reflects technique, consideration to follow-up CT imaging.        Assessment :      Primary Problem  <principal problem not specified>    Active Hospital Problems    Diagnosis Date Noted    Atrial fibrillation [facility]) [I48.91] 08/03/2020       Plan:     Patient status Admit as inpatient in the  Progressive Unit/Step down    New-onset atrial fibrillation  - EKG done in office visit today showing possible new onset atrial fibrillation  - Repeat EKG in ED showed sinus rhythm with PVCs and PACs  - Consult to cardiology placed  - Echo done 2018 showed LVEF of 45-50%    Chronic respiratory failure with hypoxemia secondary to pulmonary fibrosis and silicosis  - Diagnosed with silicosis in 0673, lung biopsy in 7/2019  - On home oxygen 4L, continue oxygen therapy here  - Continue home medication of albuterol and budesonide  - On prednisone 10 daily  - Consult to pulmonology placed    Right lower extremity wound  - From dog scratch, s/p closure with stitches on 7/30  - Continue augmetin    Hypothyroidism  - Continue 25 mg levothyroxine  - TSH 1.44 (normal) in ED    HTN  - Continue home medications: amlodipine, benazapril, and lasix    Major depression  - Continue cymbalta  - No suicidal ideation    Insomnia  - Zolpidem nightly    VTE prophylaxis: levenox 40 mg daily  PT/OT  Dispo: social work order placed      Consultations:   IP CONSULT TO INTERNAL MEDICINE  IP CONSULT TO CARDIOLOGY  IP CONSULT TO PULMONOLOGY  IP CONSULT TO SOCIAL WORK    Patient is admitted as inpatient status because of co-morbidities listed above, severity of signs and symptoms as outlined, requirement for current medical therapies and most importantly because of direct risk to patient if care not provided in a hospital setting. Gloria Gowers, MD  8/4/2020  6:31 PM  Attending Physician Statement    I have discussed the case of Velma Scale, including pertinent history and exam findings with the resident. I have seen and examined the patient and the key elements of the encounter have been performed by me. I agree with the assessment, plan, and orders as documented by the resident. The patient was seen by me about 30 years ago. He has chronic silicosis, pulmonary fibrosis and asbestosis. He has presented with acute atrial fibrillation which is most likely due to right ventricular right atrial enlargement.   His clinical condition has improved  Electronically signed by Gloria Gowers, MD on 8/4/2020 at 6:31 PM     Copy sent to Dr. Dara Henderson PA-C

## 2020-08-03 NOTE — ED NOTES
Mode of arrival (squad #, walk in, police, etc) : Pt. Brought to ED via EMS      Chief complaint(s): SOB        Arrival Note (brief scenario, treatment PTA, etc).:Pt. arrived to ED via EMS. Pt. Stated he was at his DR. Getting an EKG when the DRJami Told him he had A-fib. The Pt. Stated his o2 tank also ran out of oxygen and his o2 sat was in the low 80's. Pt. Stated the DRJami Told him he needed to come to the ED as soon as possible. The Dr. Arthur Edwards called 911 for pt. Pt. Denied any trouble breathing at this time. Pt. Is alert and oriented, resting comfortably on the bed. C= \"Have you ever felt that you should Cut down on your drinking? \" No  A= \"Have people Annoyed you by criticizing your drinking? \"  No  G= \"Have you ever felt bad or Guilty about your drinking? \"  No  E= \"Have you ever had a drink as an Eye-opener first thing in the morning to steady your nerves or to help a hangover? \"  No      Deferred []      Reason for deferring: N/A    *If yes to two or more: probable alcohol abuse. Karoline Phalen, RN  08/03/20 [id number]

## 2020-08-03 NOTE — ED PROVIDER NOTES
[address] [facility]      Pt Name: Soumya Goetz  MRN: [id number]  [place] 1955  Date of evaluation: 8/3/20      CHIEF COMPLAINT       Chief Complaint   Patient presents with    Shortness of Breath       HISTORY OF PRESENT ILLNESS   HPI 60 y.o. male is sent to the emergency department for evaluation of possible new onset atrial fibrillation. The patient has pulmonary silicosis he is on 4 L of home oxygen. He says that he went to his doctor's office today for a wound check, he says that during this time, his oxygen container ran out he was very hypoxic, they did an EKG and it showed a possible atrial fibrillation and so he was sent to the emergency department for further evaluation. Patient reports chronic shortness of breath, he does not think it is too much worse off of his baseline. He denies any chest pain. He denies any fevers or chills. He says that he will occasionally feel heart palpitations which do worsen the shortness of breath. His symptoms are rated as moderate to severe in severity, constant and course. REVIEW OF SYSTEMS       Review of Systems   Constitutional: Negative for chills and fever. HENT: Negative for congestion. Eyes: Negative for visual disturbance. Respiratory: Positive for shortness of breath. Cardiovascular: Positive for palpitations. Gastrointestinal: Negative for abdominal pain, nausea and vomiting. Endocrine: Negative for cold intolerance and heat intolerance. Genitourinary: Negative for dysuria. Musculoskeletal: Negative for back pain. Neurological: Negative for headaches. Hematological: Negative for adenopathy.        PAST MEDICAL HISTORY     Past Medical History:   Diagnosis Date    Depressive disorder, not elsewhere classified     Impotence of organic origin     Insomnia, unspecified     Myopathy, unspecified     Postinflammatory pulmonary fibrosis ([facility] [address]     Unspecified essential hypertension     Unspecified hypothyroidism        SURGICAL HISTORY       Past Surgical History:   Procedure Laterality Date    LUNG BIOPSY Left        CURRENT MEDICATIONS       Current Discharge Medication List      CONTINUE these medications which have NOT CHANGED    Details   amLODIPine (NORVASC) 5 MG tablet TAKE 1 TABLET BY MOUTH ONCE DAILY  Qty: 30 tablet, Refills: 2    Comments: Please consider 90 day supplies to promote better adherence  Associated Diagnoses: Essential hypertension      furosemide (LASIX) 20 MG tablet Take 20 mg by mouth daily as needed      zolpidem (AMBIEN CR) 12.5 MG extended release tablet Take 12.5 mg by mouth nightly as needed for Sleep.      amoxicillin-clavulanate (AUGMENTIN) 500-125 MG per tablet Take 1 tablet by mouth 2 times daily for 10 days  Qty: 20 tablet, Refills: 0      pantoprazole (PROTONIX) 40 MG tablet Take 1 tablet by mouth once daily  Qty: 30 tablet, Refills: 0      benazepril (LOTENSIN) 40 MG tablet TAKE 1 TABLET BY MOUTH ONCE DAILY  Qty: 90 tablet, Refills: 3    Associated Diagnoses: Secondary hypertension      ibuprofen (ADVIL;MOTRIN) 800 MG tablet Take 1 tablet by mouth 2 times daily  Qty: 60 tablet, Refills: 2    Comments: Please consider 90 day supplies to promote better adherence      levothyroxine (SYNTHROID) 25 MCG tablet TAKE 1 TABLET BY MOUTH ONCE DAILY  Qty: 90 tablet, Refills: 3    Associated Diagnoses: Hypothyroidism      albuterol (PROVENTIL) (2.5 MG/3ML) 0.083% nebulizer solution Take 2.5 mg by nebulization every 6 hours as needed   Refills: 5      PROAIR  (90 BASE) MCG/ACT inhaler Inhale 2 puffs into the lungs every 6 hours as needed for Wheezing       predniSONE (DELTASONE) 10 MG tablet Take 10 mg by mouth daily       sodium chloride (ALTAMIST SPRAY) 0.65 % nasal spray 1 spray by Nasal route as needed for Congestion.   Qty: 1 Bottle, Refills: 3    Associated Diagnoses: Epistaxis      DULoxetine (CYMBALTA) 30 MG capsule Take 30 mg by mouth daily   Refills: 0 ALLERGIES     has No Known Allergies. FAMILY HISTORY     He indicated that his mother is . He indicated that his father is . He indicated that the status of his sister is unknown. He indicated that the status of his other is unknown. SOCIAL HISTORY      reports that he quit smoking about 15 years ago. He has a 3.75 pack-year smoking history. He has never used smokeless tobacco. He reports current alcohol use. He reports that he does not use drugs. PHYSICAL EXAM     INITIAL VITALS: /70   Pulse 76   Temp 97.6 °F (36.4 °C) (Oral)   Resp 16   Ht 6' 8\" (2.032 m)   Wt 259 lb (117.5 kg)   SpO2 95%   BMI 28.45 kg/m²   Gen: nad  Head: Normocephalic, atraumatic  Eye: Pupils equal round reactive to light, no conjunctivitis  ENT: MMM  Neck: no JVD  Heart: Regular rate and rhythm no murmurs  Lungs: Clear to auscultation bilaterally, no respiratory distress  Chest wall: No crepitus, no tenderness palpation  Abdomen: Soft, nontender, nondistended, with no peritoneal signs  Neurologic: Patient is alert and oriented x3, motor and sensation is intact in all 4 extremities, fluent speech  Extremities: equal mild edema, no calf tenderness to palpation, no unequal edema, R. Leg wound healing appropriately. MEDICAL DECISION MAKING:     MDM  60 y.o. male presenting with new onset afib. Obtaining ekg. Evaluating electorlytes, hemoglobin, troponin, tsh. EKG. CXR. Evaluate for covid. Emergency Department course:  1pm  EKG shows a sinus rhythm with frequent PACs and PVCs. 2pm  Hemoglobin normal. COVID negative. TSH normal.   CXR shows diffuse airspace disease consistent with his silicosis. Will d/w Dr. Marianna Nguyễn. Plan on admitting for cardiology and pulmonary evaluation.          DIAGNOSTIC RESULTS     EKG: All EKG's are interpreted by the Emergency Department Physician who either signs or Co-signs this chart in the absence of a cardiologist.    EKG shows a sinus rhythm with frequent pvcs and pacs. HR is 100, , , , no KAYLA, No STD, No TWI, the axis is normal.      RADIOLOGY:All plain film, CT, MRI, and formal ultrasound images (except ED bedside ultrasound) are read by the radiologist and the images and interpretations are directly viewed by the emergency physician. XR CHEST PORTABLE   Preliminary Result   Cardiomegaly with extensive bilateral airspace disease representing any form   of edema, ARDS, or multifocal infection. Poorly defined aortic arch. While this likely reflects technique,   consideration to follow-up CT imaging. LABS: All lab results were reviewed by myself, and all abnormals are listed below.   Labs Reviewed   CBC WITH AUTO DIFFERENTIAL - Abnormal; Notable for the following components:       Result Value    RDW 15.5 (*)     Seg Neutrophils 88 (*)     Lymphocytes 6 (*)     Absolute Lymph # 0.60 (*)     All other components within normal limits   COMPREHENSIVE METABOLIC PANEL - Abnormal; Notable for the following components:    Glucose 127 (*)     CO2 34 (*)     Alb 3.1 (*)     All other components within normal limits   MAGNESIUM   TROPONIN   TROPONIN   TSH WITH REFLEX   COVID-19   BRAIN NATRIURETIC PEPTIDE   BASIC METABOLIC PANEL W/ REFLEX TO MG FOR LOW K   CBC WITH AUTO DIFFERENTIAL       EMERGENCY DEPARTMENT COURSE:   Vitals:    Vitals:    08/03/20 1238 08/03/20 1641 08/03/20 1730   BP: (!) 128/56 111/67 103/70   Pulse: 100 81 76   Resp: 16 18 16   Temp: 97.6 °F (36.4 °C)  97.6 °F (36.4 °C)   TempSrc: Oral  Oral   SpO2: 98% 95% 95%   Weight: 259 lb (117.5 kg)     Height: 6' 8\" (2.032 m)         The patient was given the following medications while in the emergency department:  Orders Placed This Encounter   Medications    amLODIPine (NORVASC) tablet 5 mg    amoxicillin-clavulanate (AUGMENTIN) 500-125 MG per tablet 1 tablet    lisinopril (PRINIVIL;ZESTRIL) tablet 40 mg    budesonide-formoterol (SYMBICORT) 160-4.5 MCG/ACT inhaler 2 puff    DULoxetine (CYMBALTA) extended release capsule 30 mg    furosemide (LASIX) tablet 20 mg    levothyroxine (SYNTHROID) tablet 25 mcg    pantoprazole (PROTONIX) tablet 40 mg    predniSONE (DELTASONE) tablet 10 mg    albuterol (PROVENTIL) nebulizer solution 2.5 mg    sodium chloride (OCEAN, BABY AYR) 0.65 % nasal spray 1 spray    sodium chloride flush 0.9 % injection 10 mL    sodium chloride flush 0.9 % injection 10 mL    OR Linked Order Group     acetaminophen (TYLENOL) tablet 650 mg     acetaminophen (TYLENOL) suppository 650 mg    polyethylene glycol (GLYCOLAX) packet 17 g    OR Linked Order Group     promethazine (PHENERGAN) tablet 12.5 mg     ondansetron (ZOFRAN) injection 4 mg    OR Linked Order Group     potassium chloride (KLOR-CON M) extended release tablet 40 mEq     potassium bicarb-citric acid (EFFER-K) effervescent tablet 40 mEq     potassium chloride 10 mEq/100 mL IVPB (Peripheral Line)    enoxaparin (LOVENOX) injection 30 mg     -------------------------  CRITICAL CARE:   CONSULTS: IP CONSULT TO INTERNAL MEDICINE  IP CONSULT TO CARDIOLOGY  IP CONSULT TO PULMONOLOGY  IP CONSULT TO SOCIAL WORK  PROCEDURES: Procedures     FINAL IMPRESSION      1. Atrial fibrillation, unspecified type ([facility] [address]    2.  Silicosis [facility])          DISPOSITION/PLAN   DISPOSITION        PATIENT REFERRED TO:  Nacho Beard PA-C  [address]  [phone]            DISCHARGE MEDICATIONS:  Current Discharge Medication List            Lori Dale MD  Attending Emergency Physician                      Lori Dale MD  08/03/20 0513

## 2020-08-04 NOTE — PROGRESS NOTES
72 %(Pt fatigued and SOB. RN stated difficulty getting PO2)  O2 Device: Nasal cannula  O2 Flow Rate (L/min): 4 L/min  Patient Observation  Observations: Pt on 4L O2 per NC. Wound R LE from dog scratch wrapped in Kerlix       Orientation  Orientation  Overall Orientation Status: Within Functional Limits  Social/Functional History  Social/Functional History  Lives With: Spouse, Daughter(spouse works during day)  Type of Home: House  Home Layout: One level  Home Access: Stairs to enter without rails  Entrance Stairs - Number of Steps: 1  Home Equipment: Electric scooter(use on bad days)  ADL Assistance: (needs extra time for ad's daughter and spouse do most work. )  Ambulation Assistance: Independent(can't walk too far)  Transfer Assistance: Independent  Active : Yes  Occupation: On disability(since 2008)  Additional Comments: History of multiple lung issues limiting mobility and endurance. Objective          AROM RLE (degrees)  RLE AROM: WFL  AROM LLE (degrees)  LLE AROM : WFL  AROM RUE (degrees)  RUE AROM : WFL  AROM LUE (degrees)  LUE AROM : WFL  Strength RLE  Strength RLE: WFL  Strength LLE  Strength LLE: WFL     Sensation  Overall Sensation Status: WFL     Transfers  Sit to Stand: Supervision  Stand to sit: Supervision  Ambulation  Ambulation?: No(pt too fatigued)     Balance  Posture: Good  Sitting - Static: Good  Sitting - Dynamic: Good  Standing - Static: Good  Standing - Dynamic: Fair  Exercises  Hip Flexion: supine x 10  Hip Abduction: supine x 10  Ankle Pumps: x15  Comments: pt fatigued after ktc and ankle pumps  Other exercises  Other exercises?: Yes  Other exercises 1: Ed pt in blackman breathing technique to loosen phlegm which is a major limiter to his endurance.       Plan   Plan  Times per week: 5-6  Times per day: Daily  Plan weeks: 2  Specific instructions for Next Treatment: Attempt ambulation, review HEP  Current Treatment Recommendations: Functional Mobility Training, Endurance Training, Gait

## 2020-08-04 NOTE — CONSULTS
Date:   8/4/2020  Patient name: Cb Whaley  Date of admission:  8/3/2020 12:33 PM  MRN:   [id number]  YOB: 1955  PCP: Doron Rosa PA-C    Reason for Admission: Atrial fibrillation [facility]) [I48.91]    Cardiology consult: Atrial fibrillation       Impression     Supraventricular arrhythmia  CHF systolic and diastolic  Moderate LVH  Pulmonary fibrosis, chronic hypoxia, oxygen dependent  History of silicosis  Myopathy  Hypothyroidism  Essential hypertension  History of depression    ECG 8/3/2020 sinus rhythm, multifocal PACs, PVCs, ventricular couplets, incomplete right bundle branch block  Chest x-ray 8/3/2020 :Cardiomegaly with extensive bilateral airspace disease representing any form of edema, ARDS or multifocal infection    2D echo 5/26/2018:  Normal LV size, moderate LVH ejection fraction 45 to 50%, dilated RV with mildly reduced function, no significant valvular abnormality, trivial pericardial effusion    Lab work on admission sodium 141, potassium 4.0, BUN 10, creatinine 0.82  proBNP 868, high-sensitivity troponin 15 and 15  Hemoglobin 4.6, WBC 10.0, platelets 735  TSH 4.03    History of present illness  78-year-old male who has past medical history of pulmonary fibrosis, silicosis, pulmonary hypertension got admitted on 8/30/2020 with a diagnosis of new onset A. Fib. He went to see his physician and a he was very short of breath and hypoxic and electrocardiogram showed A. fib with RVR. He did not have chest pain or syncope. His ECG in this hospital showed sinus rhythm, frequent PAC, occasional PVC. No previous history of A. Fib. No previous history of coronary intervention. He has severely limited mobility secondary to his chronic pulmonary pathology.   He does get swelling over the ankles now and again and he takes diuretics as needed    Patient seen and examined  He was resting well  Hemodynamically stable  ECG monitor sinus rhythm, frequent PACs    Medications:   Scheduled Meds:   block, frequent PAC, occasional PVC. ECHO: reviewed. Ejection fraction: 50%, dilated RV, D-shaped septum, RVSP 36  Stress Test: not obtained. Cardiac Angiography: not obtained.         Assessment / Acute Cardiac Problems:     Supraventricular arrhythmias  Frequent PAC, occasional PVC  Ejection fraction 50 to 55%, dilated right ventricle, D-shaped septum, RVSP 36, 2D echo 8/4/2020  Severe COPD, silicosis, oxygen dependent  Hypothyroidism      Patient Active Problem List:     Myopathy     Essential hypertension     Depressive disorder, not elsewhere classified     Impotence of organic origin     Hypothyroidism     Insomnia     Pulmonary fibrosis (HCC)     Bronchiectasis ([address]     Hernia, umbilical     Current chronic use of systemic steroids     Pneumoconiosis ([address]     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

## 2020-08-04 NOTE — PROGRESS NOTES
OT/PT evaluation this date. Patient reports increasing fatigue and SOB/decreasing o2 levels with activity. Patient verbalizes his daily routine and difficulties with decreasing o2 saturations. Verbalizes the need for therapies and expresses need to be \"taught\" how to manage breathing and issues with phlegm build up  Overall Orientation Status: Within Functional Limits  Orientation Level: Oriented X4  Vision  Vision: Within Functional Limits  Hearing  Hearing: Within functional limits  Social/Functional History  Lives With: Spouse, Daughter  Type of Home: House  Home Layout: One level  Home Access: Stairs to enter without rails  Entrance Stairs - Number of Steps: 1  Home Equipment: Electric scooter(uses on bad days)  ADL Assistance: (needs extra time for ad's daughter and spouse do most work)  Ambulation Assistance: Independent(reports he can't walk too far)  Transfer Assistance: Independent  Active : Yes  Occupation: On disability(since 2008)  Additional Comments: History of multiple lung issues limiting mobility and endurance; spouse works during the day  Pain Assessment  Pain Assessment: 0-10  Pain Level: 2  Pain Location: Generalized  Pain Descriptors: Dull    Objective      Cognition  Overall Cognitive Status: WFL   Sensation  Overall Sensation Status: WFL   ADL  Feeding: Independent  Grooming: Stand by assistance  UE Bathing: Stand by assistance  LE Bathing: Stand by assistance  UE Dressing: Stand by assistance  LE Dressing: Stand by assistance  Toileting: Stand by assistance  Additional Comments: Patient physically able to perform self care tasks, however requires stand by assistance due to decreasing o2 sats with activity. Patient requires increased time for tasks and rest breaks for o2 recovery. Education provided on proper breathing techniques.     UE Function           LUE Strength  Gross LUE Strength: WFL  L Hand General: 5/5     LUE Tone: Normotonic     LUE AROM (degrees)  LUE AROM : WFL     Left Hand AROM (degrees)  Left Hand AROM: WFL  RUE Strength  Gross RUE Strength: WFL  R Hand General: 5/5      RUE Tone: Normotonic     RUE AROM (degrees)  RUE AROM : WFL     Right Hand AROM (degrees)  Right Hand AROM: WFL    Fine Motor Skills  Coordination  Movements Are Fluid And Coordinated:  Yes                           Mobility  Supine to Sit: Modified independent  Sit to Supine: Modified independent       Balance  Sitting Balance: Independent(seated edge of bed)  Standing Balance: Stand by assistance(no UE support or device)     Functional Mobility  Functional Mobility Comments: deferred mobility tasks due to patient fatigue and decreased o2 sats with activity  Bed mobility  Supine to Sit: Modified independent  Sit to Supine: Modified independent  Scooting: Modified independent  Comment: head of bed elevated, no use of handrail     Transfers  Sit to stand: Stand by assistance  Stand to sit: Stand by assistance  Transfer Comments: from edge of bed, no UE support or device required      Assessment  Performance deficits / Impairments: Decreased endurance, Decreased functional mobility , Decreased ADL status(decreasing o2 sats with activity)  Treatment Diagnosis: impaired self care status  Prognosis: Good  Decision Making: Medium Complexity  REQUIRES OT FOLLOW UP: Yes  Activity Tolerance: Patient limited by fatigue  Activity Tolerance: decreasing o2 sats 72-85 during session (RN reports they have not gotten a good reading)         Functional Outcome Measures  AM-PAC Daily Activity Inpatient   How much help for putting on and taking off regular lower body clothing?: None  How much help for Bathing?: None  How much help for Toileting?: None  How much help for putting on and taking off regular upper body clothing?: None  How much help for taking care of personal grooming?: None  How much help for eating meals?: None  AM-[facility] Inpatient Daily Activity Raw Score: 24  AM-PAC Inpatient ADL T-Scale Score : 57.54  ADL Inpatient CMS 0-100% Score: 0  ADL Inpatient CMS G-Code Modifier : [address]       Goals  Patient Goals   Patient goals : to breathe easier  Short term goals  Time Frame for Short term goals: by discharge, patient will  Short term goal 1: verbalize / demonstrate Good understanding of energy conservation / work simplification techniques for self care and mobility tasks  Short term goal 2: verbalize / demonstrate Good understanding of breathing techniques for o2 > 90% during self care and mobility  Short term goal 3: perform self care/mobility tasks with independence    Plan  Safety Devices  Safety Devices in place: Yes  Type of devices: Call light within reach, Left in bed     Plan  Times per week: 3-4  Times per day: Daily  Current Treatment Recommendations: Strengthening, Endurance Training, Self-Care / ADL, Equipment Evaluation, Education, & procurement, Balance Training, Functional Mobility Training, Safety Education & Training  OT Education  OT Education: OT Role, Plan of Care, ADL Adaptive Strategies, Transfer Training, Energy Conservation, Orientation, Equipment  Patient Education: breathing techniques       OT Individual Minutes  Time In: 4240  Time Out: 1000  Minutes: 32    Electronically signed by Ross Bernard OT on 8/4/20 at 4:09 PM EDT

## 2020-08-04 NOTE — PROGRESS NOTES
pantoprazole  40 mg Oral QAM AC    predniSONE  10 mg Oral Daily    sodium chloride flush  10 mL Intravenous 2 times per day    enoxaparin  30 mg Subcutaneous BID     Continuous Infusions:   PRN Meds: albuterol, sodium chloride, sodium chloride flush, acetaminophen **OR** acetaminophen, polyethylene glycol, promethazine **OR** ondansetron, potassium chloride **OR** potassium alternative oral replacement **OR** potassium chloride, LORazepam    Data:     Past Medical History:   has a past medical history of Depressive disorder, not elsewhere classified, Impotence of organic origin, Insomnia, unspecified, Myopathy, unspecified, Postinflammatory pulmonary fibrosis ([address] Unspecified essential hypertension, and Unspecified hypothyroidism. Social History:   reports that he quit smoking about 15 years ago. He has a 3.75 pack-year smoking history. He has never used smokeless tobacco. He reports current alcohol use. He reports that he does not use drugs. Family History:   Family History   Problem Relation Age of Onset    High Blood Pressure Mother     Cancer Mother     Stroke Mother     High Blood Pressure Father     Cancer Sister     Heart Disease Other         Family in general       Vitals:  /64   Pulse 50   Temp 97.9 °F (36.6 °C) (Oral)   Resp 16   Ht 6' 8\" (2.032 m)   Wt 260 lb 9.3 oz (118.2 kg)   SpO2 97%   BMI 28.63 kg/m²   Temp (24hrs), Av.6 °F (36.4 °C), Min:97.5 °F (36.4 °C), Max:97.9 °F (36.6 °C)    No results for input(s): POCGLU in the last 72 hours. I/O(24Hr):     Intake/Output Summary (Last 24 hours) at 2020 0914  Last data filed at 8/3/2020 1849  Gross per 24 hour   Intake 225 ml   Output --   Net 225 ml       Labs:    CBC with Differential:    Lab Results   Component Value Date    WBC 8.7 2020    RBC 4.82 2020    RBC 4.54 2011    HGB 14.1 2020    HCT 44.8 2020     2020     2011    MCV 93.0 2020    MCH 29.4 irregular. Pulses: Normal pulses. Heart sounds: No murmur. No gallop. Pulmonary:      Breath sounds: Rales: bilaterally in the lower lung bases. Comments: Respirations are symmetric bilaterally. Crackles heard at bilateral lung bases. Tolerating 4L of O2 on nasal cannula. Abdominal:      General: There is no distension. Palpations: Abdomen is soft. Tenderness: There is no abdominal tenderness. Hernia: A hernia (umbilical hernia) is present. Musculoskeletal: Normal range of motion. General: Signs of injury (right lower extremity dog injury wrapped, mild erythema) present. Comments: Mild edema above the ankles bilaterally. Skin:     General: Skin is warm and dry. Nails: There is clubbing. Neurological:      Mental Status: He is alert.    Psychiatric:         Mood and Affect: Mood normal.         Behavior: Behavior normal.           Assessment:        Primary Problem  <principal problem not specified>    Active Hospital Problems    Diagnosis Date Noted    Atrial fibrillation [facility]) [I48.91] 08/03/2020       Plan:        New-onset atrial fibrillation  - EKG done in office visit today showing possible new onset atrial fibrillation  - Repeat EKG in ED showed sinus rhythm with PVCs and PACs  - Consult to cardiology placed  - BNP on admission was 868  - Echo done 2018 showed LVEF of 45-50%, awaiting repeat echo today  - Magnesium on admission was 2.1, patient has had repeat PVCs/PACs on rhythm strips, given 2 g of magnesium  - Monitor potassium and magnesium  - Increased lasix to 40 mg IV BID    Chronic respiratory failure with hypoxemia secondary to pulmonary fibrosis and silicosis  - Diagnosed with silicosis in 2061, lung biopsy in 7/2019  - On home oxygen 4L, continue oxygen therapy here  - Continue home medication of albuterol and budesonide  - On prednisone 10 daily  - Consult to pulmonology placed    Right lower extremity wound  - From dog scratch, s/p closure with stitches on 7/30  - Continue augmetin    Hypothyroidism  - Continue 25 mg levothyroxine  - TSH 1.44 (normal) in ED    HTN  - Continue home medications: amlodipine, benazapril, and lasix    Major depression  - Continue cymbalta  - No suicidal ideation    Insomnia  - Zolpidem nightly at home, given ativan here    VTE prophylaxis: levenox 40 mg daily  PT/OT  Dispo: social work order placed       Radha Hampton MD  8/4/2020  9:14 AM     Attending Physician Statement    I have discussed the case of Jerel Charles, including pertinent history and exam findings with the resident. I have seen and examined the patient and the key elements of the encounter have been performed by me. I agree with the assessment, plan, and orders as documented by the resident.   Please see my comments on  H&P  Electronically signed by Hugh Bunch MD on 8/4/2020 at 6:33 PM

## 2020-08-04 NOTE — PLAN OF CARE
Problem: Breathing Pattern - Ineffective:  Goal: Ability to achieve and maintain a regular respiratory rate will improve  Description: Ability to achieve and maintain a regular respiratory rate will improve  8/4/2020 0444 by Fior Wolff RN  Outcome: Ongoing     Problem: Falls - Risk of:  Goal: Will remain free from falls  Description: Will remain free from falls  8/4/2020 0444 by Fior Wolff RN  Outcome: Ongoing     Problem: Skin Integrity:  Goal: Will show no infection signs and symptoms  Description: Will show no infection signs and symptoms  8/4/2020 0444 by Fior Wolff RN  Outcome: Ongoing    Monitor pt needs and pain on rounds. Encourage repositioning. Use threapeutic communication with pt. No fall this shift, precautions in place. Pt showed no signs of distress throughout.

## 2020-08-04 NOTE — CONSULTS
PULMONARY  CONSULT NOTE      Date of Admission: 8/3/2020 12:33 PM    Reason for Consult: Hypoxia, Silicosis    Referring Physician: Cole Hackett MD   PCP: Marry Diaz PA-C     History of Present Illness: Shade Parra is a 60 y.o. White   male, past medical history pulmonary fibrosis and silicosis with chronic respiratory failure on home oxygen, HTN, hypothyroidism, and major depression, who presents with new onset Atrial fibrillation and hypoxemia. He was at his PCP's office for wound recheck on his right lower extremity. He had an EKG done which showed A fib. He also ran out of oxygen in his tank during his appointment. His O2 sats were noted to 56% on room air. He reports some increased swelling in his legs. CXR showed cardiomegaly and extensive bilateral airspace disease. Repeat EKG showed NSR with frequent PVCS and PACs.      Problem:  Principal Problem: Hypoxia, silicosis     PMH:   Past Medical History:   Diagnosis Date    Depressive disorder, not elsewhere classified     Impotence of organic origin     Insomnia, unspecified     Myopathy, unspecified     Postinflammatory pulmonary fibrosis ([address]     Unspecified essential hypertension     Unspecified hypothyroidism        PSH:   Past Surgical History:   Procedure Laterality Date    LUNG BIOPSY Left        Allergies: No Known Allergies    Home Meds:  Medications Prior to Admission: amLODIPine (NORVASC) 5 MG tablet, TAKE 1 TABLET BY MOUTH ONCE DAILY  furosemide (LASIX) 20 MG tablet, Take 20 mg by mouth daily as needed  zolpidem (AMBIEN CR) 12.5 MG extended release tablet, Take 12.5 mg by mouth nightly as needed for Sleep.  amoxicillin-clavulanate (AUGMENTIN) 500-125 MG per tablet, Take 1 tablet by mouth 2 times daily for 10 days  pantoprazole (PROTONIX) 40 MG tablet, Take 1 tablet by mouth once daily  benazepril (LOTENSIN) 40 MG tablet, TAKE 1 TABLET BY MOUTH ONCE DAILY  ibuprofen (ADVIL;MOTRIN) 800 MG tablet, Take 1 tablet by mouth 2 times daily  levothyroxine (SYNTHROID) 25 MCG tablet, TAKE 1 TABLET BY MOUTH ONCE DAILY  albuterol (PROVENTIL) (2.5 MG/3ML) 0.083% nebulizer solution, Take 2.5 mg by nebulization every 6 hours as needed   PROAIR  (90 BASE) MCG/ACT inhaler, Inhale 2 puffs into the lungs every 6 hours as needed for Wheezing   predniSONE (DELTASONE) 10 MG tablet, Take 10 mg by mouth daily   sodium chloride (ALTAMIST SPRAY) 0.65 % nasal spray, 1 spray by Nasal route as needed for Congestion. DULoxetine (CYMBALTA) 30 MG capsule, Take 30 mg by mouth daily     Social History:   Social History     Socioeconomic History    Marital status:      Spouse name: Not on file    Number of children: Not on file    Years of education: Not on file    Highest education level: Not on file   Occupational History    Occupation: disability   Social Needs    Financial resource strain: Not on file    Food insecurity     Worry: Not on file     Inability: Not on file   [employer] needs     Medical: Not on file     Non-medical: Not on file   Tobacco Use    Smoking status: Former Smoker     Packs/day: 0.25     Years: 15.00     Pack years: 3.75     Last attempt to quit: 2/18/2005     Years since quitting: 15.4    Smokeless tobacco: Never Used   Substance and Sexual Activity    Alcohol use:  Yes     Alcohol/week: 0.0 standard drinks     Comment: every weekend- 6 Beers/[place]    Drug use: No    Sexual activity: Not on file   Lifestyle    Physical activity     Days per week: Not on file     Minutes per session: Not on file    Stress: Not on file   Relationships    Social connections     Talks on phone: Not on file     Gets together: Not on file     Attends Judaism service: Not on file     Active member of club or organization: Not on file     Attends meetings of clubs or organizations: Not on file     Relationship status: Not on file    Intimate partner violence     Fear of current or ex partner: Not on file     Emotionally abused: Not on file     Physically abused: Not on file     Forced sexual activity: Not on file   Other Topics Concern    Not on file   Social History Narrative    Not on file       Family History:   Family History   Problem Relation Age of Onset    High Blood Pressure Mother     Cancer Mother     Stroke Mother     High Blood Pressure Father     Cancer Sister     Heart Disease Other         Family in general       Review of Systems  Fever/ chills - no  Chest pain - no  Cough - yes  Expectoration / hemoptysis - no  shortness of breath - yes   Headache - no  Sinus drainage/ sore throat - no  abdominal pain - no  Swelling feet - yes   Nausea/ vomiting/ diarrhea/ constipation - no  RLE wound+    Physical Exam  Vital Signs: /64   Pulse 50   Temp 97.9 °F (36.6 °C) (Oral)   Resp 16   Ht 6' 8\" (2.032 m)   Wt 260 lb 9.3 oz (118.2 kg)   SpO2 97%   BMI 28.63 kg/m²       Admission Weight: Weight: 259 lb (117.5 kg)    General Appearance: Healthy, alert, active, cooperative, and in no distress  Head: Normocephalic, without obvious abnormality, atraumatic  Neck: no adenopathy, no JVD, supple, symmetrical, trachea midline\"thyroid not enlarged, symmetric, no tenderness/mass/nodule  Lungs: fair air entry bilaterally; breath sounds- vesicular; rhonchi- absent; rales/ crackles- absent  Heart: : regular rate and rhythm, S1, S2 normal, no murmur, click, rub or gallop  Abdomen: soft, non-tender; bowel sounds normal; no masses,  no organomegaly  Extremities: extremities normal, atraumatic, no cyanosis, +1 BLE edema  Skin: skin color, texture, turgor normal. Dressing to RLE   Neurologic: Grossly normal    [unfilled]    Recent labs, Imaging studies reviewed      Data ReviewCBC:   Recent Labs     08/03/20  1247 08/04/20  0454   WBC 10.0 8.7   RBC 4.68 4.82   HGB 14.6 14.1   HCT 44.4 44.8    228     BMP:   Recent Labs     08/03/20  1247 08/04/20  0454   GLUCOSE 127* 94    145*   K 4.0 4.6   BUN 10 11   CREATININE 0.82

## 2020-08-04 NOTE — CARE COORDINATION
CASE MANAGEMENT NOTE:    Admission Date:  8/3/2020 Vince Sanderson is a 60 y.o.  male    Admitted for : Atrial fibrillation ([facility] [I48.91]    Met with:  Patient    PCP:  Tess Medina NP                                Insurance:  [employer]      Current Residence/ Living Arrangements:  independently at home             Current Services PTA:  No    Is patient agreeable to VNS: Yes    Freedom of choice provided:  Yes    List of [address] provided: Yes    VNS chosen:  Yes - [facility]    DME:  straight cane and wheelchair    Home Oxygen: Yes @ 4L 24/7 through [address]: No - Would like a new one    CPAP/BIPAP: No    Supplier: N/A    Potential Assistance Needed: Yes    SNF needed: No    Freedom of choice and list provided: NA    Pharmacy:  [facility]       Does Patient want to use MEDS to BEDS? No    Is the Patient an [facility] with Readmission Risk Score greater than 14%? No  If yes, pt needs a follow up appointment made within 7 days. Family Members/Caregivers that pt would like involved in their care:    Yes    If yes, list name here: Wife Acosta Flores    Transportation Provider:  Patient             Is patient in Isolation/One on One/Altered Mental Status? No  If yes, skip next question. If no, would they like an I-Pad to  use? No  If yes, call [phone]. Discharge Plan:  8/4: [employer] - Patient is from 1-story home with wife and daughter. He is independent. DME - Cane, WC, home O2 @ 4L 24/7 ([facility]) - Wife can bring portable tank to get him home. Would like  [address] - Referral sent. Follow for nebulizer as patient does not have one at home. On PO augmentin, IV lasix 20 BID, ECHO. New onset A Fib - Follow for POAC. ORANGE HEADER - 9%.  PAMELA NEEDS SIGNED/COMPLETED. Dori Calderon                  Electronically signed by: Josee Cochran RN on 8/4/2020 at 1:49 PM

## 2020-08-05 NOTE — PROGRESS NOTES
Physical Therapy  Facility/Department: [facility] PROGRESSIVE CARE  Daily Treatment Note  NAME: Edward Carpio  : 1955  MRN: [id number]    Date of Service: 2020    Discharge Recommendations:  Patient would benefit from continued therapy after discharge(Outpatient pulmonary rehab)      Assessment   Treatment Diagnosis: difficulty walking  Patient Education: Energy conservation  Barriers to Learning: none  REQUIRES PT FOLLOW UP: Yes  Activity Tolerance  Activity Tolerance: Patient limited by endurance     Patient Diagnosis(es): The primary encounter diagnosis was Atrial fibrillation, unspecified type ([facility] A diagnosis of Silicosis ([facility] was also pertinent to this visit. has a past medical history of Depressive disorder, not elsewhere classified, Impotence of organic origin, Insomnia, unspecified, Myopathy, unspecified, Postinflammatory pulmonary fibrosis ([facility] Unspecified essential hypertension, and Unspecified hypothyroidism. has a past surgical history that includes Lung biopsy (Left). Restrictions  Restrictions/Precautions  Restrictions/Precautions: Up as Tolerated  Required Braces or Orthoses?: No  Subjective   General  Chart Reviewed: Yes  Family / Caregiver Present: No  Subjective  Subjective: Patient very pleasant. \"I don't walk. I just go into the bathroom and back and if I am having a bad day I use my scooter. \" Patient reports he does daily breathing tx at home and is not recieving them in hospital. RN notified. General Comment  Comments: SpO2 86% at rest on 5L via NC. Significant desat with stand. SpO2 61%, . RN notified and arrives to room, increases O2. Patient with >5 minutes to recover and places NC in mouth. Patient performs Supine>sit>stand transfer very quickly and impulsively. Education on slow movements for energy conservation. No c/o dizziness with desat.   Pain Screening  Patient Currently in Pain: Denies  Vital Signs  Pulse: 141(with stand)  Heart Rate Source: Monitor  Patient Position: Standing  Patient Currently in Pain: Denies  Oxygen Therapy  SpO2: (!) 61 %(with stand)  Pulse Oximeter Device Mode: Intermittent  O2 Device: Nasal cannula  O2 Flow Rate (L/min): 5 L/min       Objective   Bed mobility  Rolling to Right: Independent  Supine to Sit: Independent  Sit to Supine: Independent  Scooting: Independent  Comment: Patient performs supine>stand transfer very quickly. Education on performing slow transfers, sit EOB prior to stand to allow body to adjust to positional changes. Transfers  Sit to Stand: Supervision  Stand to sit: Supervision  Comment: Desat to 61% with transfer,  bpm.  Ambulation  Ambulation?: No  Stairs/Curb  Stairs?: No     Other exercises  Other exercises?: No(Requires significant time to recover from stand. Time spent educating patient on breathing techniques and energy conservation. RN present to assess patient.)      Goals  Short term goals  Time Frame for Short term goals: 10 visits  Short term goal 1: walk up/down 1 step with cristin HR supervision  Short term goal 2: ambulate 50 ft to get around his home Indep while wife at work . Short term goal 3: demo independence with HEP as above  Patient Goals   Patient goals : I want to get home health PT and breathing treatments.      Plan    Plan  Times per week: 5-6  Times per day: Daily  Plan weeks: 2  Specific instructions for Next Treatment: Attempt ambulation, review HEP  Current Treatment Recommendations: Functional Mobility Training, Endurance Training, Gait Training  Safety Devices  Type of devices: Call light within reach, Left in bed, Nurse notified     Therapy Time   Individual Concurrent Group Co-treatment   Time In 2602         Time Out 1356         Minutes [address]

## 2020-08-05 NOTE — CARE COORDINATION
ONGOING DISCHARGE PLAN:    Spoke with patient regarding discharge plan and patient confirms that plan is still to DC to home w/ Wife W/ VNS, [facility]. Pt. Would like a New Nebulizer, for his is broken, & paid OOP for it already. Pulmonary on Board. Pt. Remains on IV Lasix, 20 Bid, Po Augmentin. Awaiting Plans from Cardio, follow for PO AC, for new A Fib. Pt denies other needs at this time. Will continue to follow for additional discharge needs.     Electronically signed by Livia Chau RN on 8/5/2020 at 10:55 AM

## 2020-08-05 NOTE — CARE COORDINATION
Writer notified, Kristina, from [facility], that pt. Needs new Nebulizer. Writer Faxed, [facility] order for [facility] med list to office. Notified, office, David Lindsay is awaiting Face to Face notes, from Dr. Abel Platt. Informed Pt. To call [facility], when he gets home so they can deliver. Writer notified, Bonny Bruno, from [facility], that pt. Will most likely DC to home today. OL will follow in Epic & pull all information.

## 2020-08-05 NOTE — PLAN OF CARE
Problem: Breathing Pattern - Ineffective:  Goal: Ability to achieve and maintain a regular respiratory rate will improve  Description: Ability to achieve and maintain a regular respiratory rate will improve  Outcome: Ongoing  Note: Pt maintaining a regular respiratory rate. Continue to monitor. Problem: Falls - Risk of:  Goal: Will remain free from falls  Description: Will remain free from falls  Outcome: Ongoing  Note: Pt ambulates with a  steady gait. Safety maintained this shift. Continue to monitor. Goal: Absence of physical injury  Description: Absence of physical injury  Outcome: Ongoing     Problem: Skin Integrity:  Goal: Will show no infection signs and symptoms  Description: Will show no infection signs and symptoms  Outcome: Ongoing  Note: Skin is clean dry and intact. Continue to monitor. Goal: Absence of new skin breakdown  Description: Absence of new skin breakdown  Outcome: Ongoing     Problem: Pain:  Goal: Pain level will decrease  Description: Pain level will decrease  Outcome: Ongoing  Note: No pain at this time. 0/10 pain scale.     Goal: Control of acute pain  Description: Control of acute pain  Outcome: Ongoing  Goal: Control of chronic pain  Description: Control of chronic pain  Outcome: Ongoing

## 2020-08-05 NOTE — PLAN OF CARE
Problem: Breathing Pattern - Ineffective:  Goal: Ability to achieve and maintain a regular respiratory rate will improve  Description: Ability to achieve and maintain a regular respiratory rate will improve  8/4/2020 2253 by Tesfaye Erwin RN  Outcome: Met This Shift  8/4/2020 1659 by Ksenia Desai RN  Outcome: Ongoing     Problem: Falls - Risk of:  Goal: Will remain free from falls  Description: Will remain free from falls  8/4/2020 2253 by Tesfaye Erwin RN  Outcome: Met This Shift  8/4/2020 1659 by Ksenia Desai RN  Outcome: Ongoing  Goal: Absence of physical injury  Description: Absence of physical injury  8/4/2020 2253 by Tesfaye Erwin RN  Outcome: Met This Shift  8/4/2020 1659 by Ksenia Desai RN  Outcome: Ongoing     Problem: Skin Integrity:  Goal: Will show no infection signs and symptoms  Description: Will show no infection signs and symptoms  8/4/2020 2253 by Tesfaye Erwin RN  Outcome: Met This Shift  8/4/2020 1659 by Ksenia Desai RN  Outcome: Ongoing  Goal: Absence of new skin breakdown  Description: Absence of new skin breakdown  8/4/2020 2253 by Tesfaye Erwin RN  Outcome: Met This Shift  8/4/2020 1659 by Ksenia Desai RN  Outcome: Ongoing

## 2020-08-05 NOTE — CARE COORDINATION
Writer called, [address] at [phone], spoke to [employer]. Writer Called IN:    Eliquis, PO, 5 MG, 1 tab, Bid, #60, w 3 refills. Per Dr. Clark Dugan. Cost of Med is  $563. 48. Med needs a Prior Authorization:    Phone [phone]    ID # [id number]    Will need to get denice. Pt. & Charge Nurse Leti Kingston, & Nurse Bailey Paul informed & state understanding.

## 2020-08-05 NOTE — PROGRESS NOTES
[facility]    PROGRESS NOTE             8/5/2020    8:37 AM    Name:   Velma Valdovinos  MRN:     [id number]     Acct:      [de-identified]   Room:   2114/2114-[facility] Day:  2  Admit Date:  8/3/2020 12:33 PM    PCP:  Dara Henderson PA-C  Code Status:  Full Code    Subjective:     C/C:   Chief Complaint   Patient presents with    Shortness of Breath     Interval History Status: improved. Patient seen and examined at bedside this AM.  No acute events overnight. Patient says he is improved and his shortness of breath continues to be at baseline. Was on 5L of nasal cannula this AM, patient thinks it is too high and would like to be back to 4L if his O2 sat is okay. Says he woke up with a mild frontal HA (2/10 on pain scale) that he thinks might be from being on higher O2 than he is used to. BERG was gradually improving since waking up, no numbness/tingling or visual disturbances. Tolerated PO intake with no nausea/vomiting. No chest pain or palpitations. Brief History:     Please see H&P. Review of Systems:     Review of Systems   Constitutional: Negative for chills and fever. Eyes: Negative for visual disturbance. Respiratory: Positive for cough (chronic with phlegm) and shortness of breath (chronic, no change). Cardiovascular: Positive for leg swelling. Negative for chest pain and palpitations. Gastrointestinal: Negative for abdominal distention, abdominal pain, constipation, diarrhea, nausea and vomiting. Genitourinary: Negative for hematuria. Neurological: Positive for headaches (mild, dull, frontal). Negative for dizziness, syncope, facial asymmetry, weakness, light-headedness and numbness. Psychiatric/Behavioral: Negative for confusion, dysphoric mood and sleep disturbance. Medications:      Allergies:  No Known Allergies    Current Meds:   Scheduled Meds:    amoxicillin-clavulanate  1 tablet Oral 2 times per day    lisinopril  20 mg Oral Daily    furosemide  20 mg Intravenous Daily    amLODIPine  5 mg Oral Daily    budesonide-formoterol  2 puff Inhalation BID    DULoxetine  30 mg Oral Daily    levothyroxine  25 mcg Oral Daily    pantoprazole  40 mg Oral QAM AC    predniSONE  10 mg Oral Daily    sodium chloride flush  10 mL Intravenous 2 times per day    enoxaparin  30 mg Subcutaneous BID     Continuous Infusions:   PRN Meds: perflutren lipid microspheres, albuterol, sodium chloride, sodium chloride flush, acetaminophen **OR** acetaminophen, polyethylene glycol, promethazine **OR** ondansetron, potassium chloride **OR** potassium alternative oral replacement **OR** potassium chloride, LORazepam    Data:     Past Medical History:   has a past medical history of Depressive disorder, not elsewhere classified, Impotence of organic origin, Insomnia, unspecified, Myopathy, unspecified, Postinflammatory pulmonary fibrosis ([facility] Unspecified essential hypertension, and Unspecified hypothyroidism. Social History:   reports that he quit smoking about 15 years ago. He has a 3.75 pack-year smoking history. He has never used smokeless tobacco. He reports current alcohol use. He reports that he does not use drugs. Family History:   Family History   Problem Relation Age of Onset    High Blood Pressure Mother     Cancer Mother     Stroke Mother     High Blood Pressure Father     Cancer Sister     Heart Disease Other         Family in general       Vitals:  /69   Pulse 58   Temp 98.4 °F (36.9 °C) (Oral)   Resp 18   Ht 6' 8\" (2.032 m)   Wt 250 lb 10.4 oz (113.7 kg)   SpO2 92%   BMI 27.54 kg/m²   Temp (24hrs), Av.1 °F (36.7 °C), Min:97.6 °F (36.4 °C), Max:98.6 °F (37 °C)    No results for input(s): POCGLU in the last 72 hours. I/O(24Hr):     Intake/Output Summary (Last 24 hours) at 2020 0837  Last data filed at 2020 0549  Gross per 24 hour   Intake 650 ml   Output 2600 ml   Net -1950 ml Labs:    CBC with Differential:    Lab Results   Component Value Date    WBC 8.7 08/04/2020    RBC 4.82 08/04/2020    RBC 4.54 12/28/2011    HGB 14.1 08/04/2020    HCT 44.8 08/04/2020     08/04/2020     12/28/2011    MCV 93.0 08/04/2020    MCH 29.4 08/04/2020    MCHC 31.6 08/04/2020    RDW 15.7 08/04/2020    LYMPHOPCT 12 08/04/2020    MONOPCT 11 08/04/2020    BASOPCT 1 08/04/2020    MONOSABS 1.00 08/04/2020    LYMPHSABS 1.10 08/04/2020    EOSABS 0.60 08/04/2020    BASOSABS 0.10 08/04/2020    DIFFTYPE NOT REPORTED 08/04/2020     BMP:    Lab Results   Component Value Date     08/05/2020    K 4.2 08/05/2020     08/05/2020    CO2 38 08/05/2020    BUN 11 08/05/2020    LABALBU 3.1 08/03/2020    CREATININE 1.00 08/05/2020    CALCIUM 8.9 08/05/2020    GFRAA >60 08/05/2020    LABGLOM >60 08/05/2020    GLUCOSE 101 08/05/2020    GLUCOSE 157 12/28/2011       Lab Results   Component Value Date/Time    SPECIAL  12/26/2011 07:42 PM     RIGHT ANTECUBITAL Performed at [facility]     Lab Results   Component Value Date/Time    CULTURE NO GROWTH 6 DAYS 12/26/2011 07:42 PM    CULTURE  Performed at [facility] 12/26/2011 07:42 PM         Radiology:    Xr Chest Portable    Result Date: 8/4/2020  EXAMINATION: ONE X-RAY VIEW OF THE CHEST 8/3/2020 1:14 pm COMPARISON: December 26, 2011 HISTORY: ORDERING SYSTEM PROVIDED HISTORY: SOB TECHNOLOGIST PROVIDED HISTORY: SOB Reason for Exam: Patient states SOB 2 days Acuity: Acute Type of Exam: Initial FINDINGS: Extensive multifocal bilateral interstitial and airspace disease confluent throughout the mid and lower lungs. The heart is enlarged. The aorta is not well defined. Cardiomegaly with extensive bilateral airspace disease representing any form of edema, ARDS, or multifocal infection. Poorly defined aortic arch. While this likely reflects technique, consideration to follow-up CT imaging.          Physical Examination:        Physical 20 mg and lasix to 20 mg daily  - BNP on admission was 868  - Echo done 2018 showed LVEF of 45-50%  - Magnesium on admission was 2.1, patient has had repeat PVCs/PACs on rhythm strips, given 2 g of magnesium  - Monitor potassium and magnesium  - Repeat echo done on 8/4 showed LVEF 50%, mild to moderate LVH, D-sign indicating RV pressure/volume overload; dilated right ventricle with reduced systolic function    Chronic respiratory failure with hypoxemia secondary to pulmonary fibrosis and silicosis  - Diagnosed with silicosis in 5960, lung biopsy in 7/2019  - On home oxygen 4L, continue oxygen therapy here  - Continue home medication of albuterol and budesonide  - On prednisone 10 daily  - Consult to pulmonology placed    Systolic and diastolic CHF  - Echo done on 8/4 showed LVEF 50%, mild to moderate LVH, D-shaped septum indicating RV pressure/volume overload; dilated right ventricle with reduced systolic function  - Cardiology on board, on lisinopril, lasix, amlodipine already     Right lower extremity wound  - From dog scratch, s/p closure with stitches on 7/30  - Continue augmetin    Hypothyroidism  - Continue 25 mg levothyroxine  - TSH 1.44 (normal) in ED    HTN  - Continue home medications: amlodipine, benazapril, and lasix    Major depression  - Continue cymbalta  - No suicidal ideation    Insomnia  - Zolpidem nightly at home, given ativan here    VTE prophylaxis: levenox 30 mg daily  PT/OT  Dispo: social work order placed; possible d/c later today       Mango Hernandez MD  8/5/2020  8:37 AM     Attending Physician Statement    I have discussed the case of Elen Vaughns, including pertinent history and exam findings with the resident. I have seen and examined the patient and the key elements of the encounter have been performed by me. I agree with the assessment, plan, and orders as documented by the resident.   The patient's atrial fibrillation rate is well controlled with a rate of 72-80 and he is also less dyspneic. He can be discharged later today.   He did state that home he takes almost 1 hour of steam inhalation it helps him to bring up his thick sputum and secretions    Electronically signed by Sammi Castro MD on 8/5/2020 at 10:33 AM

## 2020-08-05 NOTE — DISCHARGE INSTR - COC
Continuity of Care Form    Patient Name: Geri Lance   :  1955  MRN:  [id number]    Admit date:  8/3/2020  Discharge date:  2020    Code Status Order: Full Code   Advance Directives:   [facility] Documentation     Date/Time Healthcare Directive Type of Healthcare Directive Copy in [facility] [address] Agent's Name Healthcare Agent's Phone Number    20 1851  No, patient does not have an advance directive for healthcare treatment -- -- -- -- --          Admitting Physician:  Sara Thompson MD  PCP: Renee Porter PA-C    Discharging Nurse: [facility] Unit/Room#: 2114/2114-01  Discharging Unit Phone Number: [phone]    Emergency Contact:   Extended Emergency Contact Information  Primary Emergency Contact: Samra Areraga  Address: [address] Phone: [phone]  Mobile Phone: [phone]  Relation: Spouse    Past Surgical History:  Past Surgical History:   Procedure Laterality Date    LUNG BIOPSY Left        Immunization History:   Immunization History   Administered Date(s) Administered    Influenza Vaccine, unspecified formulation 10/05/2016    Influenza Virus Vaccine 2009, 2011, 2014, 2015    Influenza Whole 2015    Influenza, Quadv, IM, PF (6 mo and older Fluzone, Flulaval, Fluarix, and 3 yrs and older Afluria) 10/05/2016, 2017    Pneumococcal Conjugate 13-valent (Kimmie Schanz) 2014    Tdap (Boostrix, Adacel) 10/05/2016, 10/23/2019       Active Problems:  Patient Active Problem List   Diagnosis Code    Myopathy G72.9    Essential hypertension I10    Depressive disorder, not elsewhere classified F32.9    Impotence of organic origin N52.9    Hypothyroidism E03.9    Insomnia G47.00    Pulmonary fibrosis ([address] J84.10    Bronchiectasis ([address] J47.9    Hernia, umbilical J24.7    Current chronic use of systemic steroids Z79.52    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    [facility]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 ([facility] 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    Major depressive disorder, recurrent, in full remission ([facility] F33.42    Atrial fibrillation ([facility] I48.91       Isolation/Infection:   Isolation          No Isolation        Patient Infection Status     Infection Onset Added Last Indicated Last Indicated By Review Planned Expiration Resolved Resolved By    None active    Resolved    COVID-19 Rule Out 08/03/20 08/03/20 08/03/20 COVID-19 (Ordered)   08/03/20 Rule-Out Test Resulted          Nurse Assessment:  Last Vital Signs: /68   Pulse 141 Comment: with stand  Temp 97.7 °F (36.5 °C) (Oral)   Resp 18   Ht 6' 8\" (2.032 m)   Wt 250 lb 10.4 oz (113.7 kg)   SpO2 93%   BMI 27.54 kg/m²     Last documented pain score (0-10 scale): Pain Level: 0  Last Weight:   Wt Readings from Last 1 Encounters:   08/05/20 250 lb 10.4 oz (113.7 kg)     Mental Status:  oriented and alert    IV Access:  - None    Nursing Mobility/ADLs:  Walking   Independent  Transfer  Independent  Bathing  Independent  Dressing  Independent  Toileting  Independent  Feeding  Independent  Med [address]  Med Delivery   whole    Wound Care Documentation and Therapy:  Wound 08/03/20 Pretibial Right; Anterior dog bite,, sutures intact--8cm  X  5 cm wide--echymotic areas noted--no drainage noted (Active)   Wound Other 08/03/20 2014   Dressing Status Clean;Dry; Intact 08/05/20 0815   Dressing Changed Dressing reinforced 08/05/20 0815   Dressing/Treatment Dry dressing 08/03/20 2014   Dressing Change Due 08/04/20 08/03/20 2014   Wound Length (cm) 8 cm 08/03/20 1735   Wound Width (cm) 5 cm 08/03/20 1735   Wound Depth (cm) 0 cm 08/03/20 1735   Wound Surface Area (cm^2) 40 cm^2 08/03/20 1735   Wound Volume (cm^3) 0 cm^3 08/03/20 1735   Wound Assessment Other (Comment) 08/05/20 0815   Drainage Amount None 08/03/20 2014   Odor None 08/03/20 2014   Number of days: 2        Elimination:  Continence:   · Bowel:  Yes  · Bladder: Yes  Urinary Catheter: None   Colostomy/Ileostomy/Ileal Conduit: No       Date of Last BM: 8/8    Intake/Output Summary (Last 24 hours) at 8/5/2020 1622  Last data filed at 8/5/2020 0549  Gross per 24 hour   Intake 650 ml   Output 1100 ml   Net -450 ml     I/O last 3 completed shifts: In: 650 [P.O.:650]  Out: 2600 [Urine:2600]    Safety Concerns: At Risk for Falls    Impairments/Disabilities:      Vision        Nutrition Therapy:  Current Nutrition Therapy:   - Oral Diet:  General    Routes of Feeding: Oral  Liquids: Thin Liquids  Daily Fluid Restriction: no  Last Modified Barium Swallow with Video (Video Swallowing Test): not done    Treatments at the Time of Hospital Discharge:   Respiratory Treatments: See Mar  Oxygen Therapy:  is on oxygen at 5 L/min per nasal cannula. Ventilator:    - BiPAP   IPAP: 12 cmH20, CPAP/EPAP: 6 cmH2O only when sleeping    Rehab Therapies: Physical Therapy and Occupational Therapy  Weight Bearing Status/Restrictions: No weight bearing restirctions  Other Medical Equipment (for information only, NOT a DME order):  wheelchair and cane  Other Treatments: Skilled Nursing Assessment Per Protocol. PT NEEDS A LOT OF MEDICATION EDUCATION/MONITORING/FOLLOWING. IS GETTING CONFUSED ABOUT MEDS, INHALERS, RESP TX. PLEASE FOLLOW. Right Lower Leg Wound dressing change daily. .. Cleanse with soap and water, dry, cover with nonadherent dressing or vaseline gauze, wrap with kerlex.     Patient's personal belongings (please select all that are sent with patient):  Glasses    RN SIGNATURE:  Electronically signed by Sarah Au RN on 8/11/20 at 11:06 AM EDT    CASE MANAGEMENT/SOCIAL WORK SECTION    Inpatient Status Date:     Readmission Risk Assessment Score:  Readmission Risk              Risk of Unplanned Readmission:        10           Discharging to Facility/ [address]  Phone: [phone]  · Fax [phone]    Dialysis Facility (if applicable)   · Name:  · Address:  · Dialysis Schedule:  · Phone:  · Fax:    / signature: Electronically signed by Pamela Lloyd RN on 8/5/20 at 4:25 PM EDT    PHYSICIAN SECTION    Prognosis: Good    Condition at Discharge: Stable    Rehab Potential (if transferring to Rehab): Good    Recommended Labs or Other Treatments After Discharge: Right Lower Leg Wound dressing change daily. .. Cleanse with soap and water, dry, cover with nonadherent dressing or vaseline gauze, wrap with kerlex. Physician Certification: I certify the above information and transfer of Yumiko Rondon  is necessary for the continuing treatment of the diagnosis listed and that he requires [address] for less 30 days. Update Admission H&P: No change in H&P    PHYSICIAN SIGNATURE: V/O Dr. Lucie Hood/LINDA Frederick RN, Electronically signed by Manuel Lezama MD on 8/7/2020 at 12:26 PM  Electronically signed by Kin Echevarria MD on 8/11/2020 at 1:40 PM

## 2020-08-05 NOTE — PROGRESS NOTES
Date:   8/5/2020  Patient name: Edward Carpio  Date of admission:  8/3/2020 12:33 PM  MRN:   [id number]  YOB: 1955  PCP: Taylor Zepeda PA-C    Reason for Admission: Atrial fibrillation [facility]) [I48.91]    Cardiology consult: Atrial fibrillation /ECG findings performed in the PCP office          Impression      Supraventricular arrhythmia, frequent multifocal PACs  CHF systolic and diastolic, ejection fraction 45 to 50%  Moderate LVH  Pulmonary fibrosis, chronic hypoxia, oxygen dependent  History of silicosis  Myopathy  Hypothyroidism  Essential hypertension  History of depression     ECG 8/3/2020 sinus rhythm, multifocal PACs, PVCs, ventricular couplets, incomplete right bundle branch block  Chest x-ray 8/3/2020 :Cardiomegaly with extensive bilateral airspace disease representing any form of edema, ARDS or multifocal infection     2D echo 5/26/2018:  Normal LV size, moderate LVH ejection fraction 45 to 50%, dilated RV with mildly reduced function, no significant valvular abnormality, trivial pericardial effusion     Lab work on admission sodium 141, potassium 4.0, BUN 10, creatinine 0.82  proBNP 868, high-sensitivity troponin 15 and 15  Hemoglobin 4.6, WBC 10.0, platelets 213  TSH 7.59    History of present illness  78-year-old male who has past medical history of pulmonary fibrosis, silicosis, pulmonary hypertension got admitted on 8/30/2020 with a diagnosis of new onset A. Fib. He went to see his physician and a he was very short of breath and hypoxic and electrocardiogram showed A. fib with RVR. He did not have chest pain or syncope. His ECG in this hospital showed sinus rhythm, frequent PAC, occasional PVC. No previous history of A. Fib. No previous history of coronary intervention. He has severely limited mobility secondary to his chronic pulmonary pathology.   He does get swelling over the ankles now and again and he takes diuretics as needed    Current evaluation 8/5/2020    Patient seen and examined  He was resting in upright position  Hemodynamically stable  Afebrile    2D monitor sinus rhythm, frequent multifocal PAC, PVC      Medications:   Scheduled Meds:   sodium chloride nebulizer  3 mL Nebulization Q4H While awake    ipratropium  0.5 mg Nebulization Q4H While awake    [START ON 8/6/2020] predniSONE  20 mg Oral Daily    guaiFENesin  1,200 mg Oral BID    amoxicillin-clavulanate  1 tablet Oral 2 times per day    lisinopril  20 mg Oral Daily    furosemide  20 mg Intravenous Daily    amLODIPine  5 mg Oral Daily    budesonide-formoterol  2 puff Inhalation BID    DULoxetine  30 mg Oral Daily    levothyroxine  25 mcg Oral Daily    pantoprazole  40 mg Oral QAM AC    sodium chloride flush  10 mL Intravenous 2 times per day    enoxaparin  30 mg Subcutaneous BID     Continuous Infusions:  CBC:   Recent Labs     08/03/20  1247 08/04/20  0454   WBC 10.0 8.7   HGB 14.6 14.1    228     BMP:    Recent Labs     08/03/20  1247 08/04/20  0454 08/05/20  0515    145* 144   K 4.0 4.6 4.2   CL 98 102 100   CO2 34* 37* 38*   BUN 10 11 11   CREATININE 0.82 0.94 1.00   GLUCOSE 127* 94 101*     Hepatic:   Recent Labs     08/03/20  1247   AST 16   ALT 13   BILITOT 0.56   ALKPHOS 61     Troponin: No results for input(s): TROPONINI in the last 72 hours. BNP: No results for input(s): BNP in the last 72 hours. Lipids: No results for input(s): CHOL, HDL in the last 72 hours. Invalid input(s): LDLCALCU  INR: No results for input(s): INR in the last 72 hours. Objective:   Vitals: /68   Pulse 141 Comment: with stand  Temp 97.7 °F (36.5 °C) (Oral)   Resp 18   Ht 6' 8\" (2.032 m)   Wt 250 lb 10.4 oz (113.7 kg)   SpO2 93%   BMI 27.54 kg/m²   General appearance: alert and cooperative with exam  HEENT: Head: Normal, normocephalic, atraumatic.   Neck: no adenopathy, no carotid bruit, no JVD, supple, symmetrical, trachea midline and thyroid not enlarged, symmetric, no tenderness/mass/nodules  Lungs: diminished breath sounds bibasilar  Heart: -Apical impulse not palpable heart sounds distant  Abdomen: Obese bowel sounds present  Extremities: Homans sign is negative, no sign of DVT  Neurologic: Mental status: Alert, oriented, thought content appropriate    EKG: Sinus rhythm with frequent multifocal PACs, occasionalatrialrun, PVC. ECHO: reviewed. Ejection fraction: 50%, mild to moderate LVH, D-shaped septum dilated RV with reduced function  Stress Test: not obtained. Cardiac Angiography: not obtained.         Assessment / Acute Cardiac Problems:     Supraventricular arrhythmias  Frequent multifocal PAC, occasional PVC occasional atrial run  Ejection fraction 50 to 55%, dilated right ventricle, D-shaped septum, RVSP 36, 2D echo 8/4/2020  Severe COPD, silicosis, oxygen dependent  Hypothyroidism    Patient Active Problem List:     Myopathy     Essential hypertension     Depressive disorder, not elsewhere classified     Impotence of organic origin     Hypothyroidism     Insomnia     Pulmonary fibrosis (HCC)     Bronchiectasis ([address]     Hernia, umbilical     Current chronic use of systemic steroids     Pneumoconiosis ([address]     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 once a day, DC amlodipine    Electronically signed by Nathen Sanches MD on 8/5/2020 at 5:38 PM

## 2020-08-05 NOTE — FLOWSHEET NOTE
Pt had just been out of bed, so was SOB. Writer didn't engage him in conversation very long, but he was pleasant and receptive. 08/05/20 1508   Encounter Summary   Services provided to: Patient   Referral/Consult From: [facility]   (8-5-20)   Complexity of Encounter Moderate   Length of Encounter 15 minutes   Spiritual Assessment Completed Yes   Routine   Type Initial   Spiritual/Sikhism   Type Spiritual support   Assessment Calm; Approachable   Intervention Active listening;Explored feelings, thoughts, concerns;Sustaining presence/ Ministry of presence; Discussed illness/injury and it's impact   Outcome Expressed gratitude;Engaged in conversation;Expressed feelings/needs/concerns;Coping;Receptive

## 2020-08-06 NOTE — PROGRESS NOTES
Date:   8/6/2020  Patient name: Edward Carpio  Date of admission:  8/3/2020 12:33 PM  MRN:   [id number]  YOB: 1955  PCP: Taylor Zepeda PA-C    Reason for Admission: Atrial fibrillation [facility]) [I48.91]    Cardiology consult: Atrial fibrillation/multifocal frequent PACs, atrial runs       Impression      Supraventricular arrhythmia  CHF systolic and diastolic  Moderate LVH  Pulmonary fibrosis, chronic hypoxia, oxygen dependent  History of silicosis  Myopathy  Hypothyroidism  Essential hypertension  History of depression     ECG 8/3/2020 sinus rhythm, multifocal PACs, PVCs, ventricular couplets, incomplete right bundle branch block  Chest x-ray 8/3/2020 :Cardiomegaly with extensive bilateral airspace disease representing any form of edema, ARDS or multifocal infection     2D echo 5/26/2018:  Normal LV size, moderate LVH ejection fraction 45 to 50%, dilated RV with mildly reduced function, no significant valvular abnormality, trivial pericardial effusion     Lab work on admission sodium 141, potassium 4.0, BUN 10, creatinine 0.82  proBNP 868, high-sensitivity troponin 15 and 15  Hemoglobin 4.6, WBC 10.0, platelets 909  TSH 7.81    History of present illness  73-year-old male who has past medical history of pulmonary fibrosis, silicosis, pulmonary hypertension got admitted on 8/30/2020 with a diagnosis of new onset A. Fib. He went to see his physician and a he was very short of breath and hypoxic and electrocardiogram showed A. fib with RVR. He did not have chest pain or syncope. His ECG in this hospital showed sinus rhythm, frequent PAC, occasional PVC. No previous history of A. Fib. No previous history of coronary intervention. He has severely limited mobility secondary to his chronic pulmonary pathology.   He does get swelling over the ankles now and again and he takes diuretics as needed    Patient seen and examined and discussed with the patient's nurse  I reviewed the ECG obtained in the PCP office on 8/3/2020  It showed sinus rhythm, frequent multifocal PACs, PVCs    Patient was resting well  Did not appear in any distress  He was started on IV prednisone by pulmonologist  ECG monitor sinus rhythm, frequent PAC and PVCs    PAC multifocal medications:   Scheduled Meds:   levalbuterol  1.25 mg Nebulization Q4H While awake    methylPREDNISolone  60 mg Intravenous Q6H    ipratropium  0.5 mg Nebulization Q4H While awake    guaiFENesin  1,200 mg Oral BID    dilTIAZem  120 mg Oral Daily    amoxicillin-clavulanate  1 tablet Oral 2 times per day    lisinopril  20 mg Oral Daily    furosemide  20 mg Intravenous Daily    budesonide-formoterol  2 puff Inhalation BID    DULoxetine  30 mg Oral Daily    levothyroxine  25 mcg Oral Daily    pantoprazole  40 mg Oral QAM AC    sodium chloride flush  10 mL Intravenous 2 times per day    enoxaparin  30 mg Subcutaneous BID     Continuous Infusions:  CBC:   Recent Labs     08/04/20  0454   WBC 8.7   HGB 14.1        BMP:    Recent Labs     08/04/20  0454 08/05/20  0515 08/06/20  0445   * 144 144   K 4.6 4.2 4.7    100 101   CO2 37* 38* 35*   BUN 11 11 13   CREATININE 0.94 1.00 0.82   GLUCOSE 94 101* 138*     Hepatic: No results for input(s): AST, ALT, ALB, BILITOT, ALKPHOS in the last 72 hours. Troponin: No results for input(s): TROPONINI in the last 72 hours. BNP: No results for input(s): BNP in the last 72 hours. Lipids: No results for input(s): CHOL, HDL in the last 72 hours. Invalid input(s): LDLCALCU  INR: No results for input(s): INR in the last 72 hours. Objective:   Vitals: BP (!) 110/52   Pulse 94   Temp 98.1 °F (36.7 °C) (Oral)   Resp 20   Ht 6' 8\" (2.032 m)   Wt 250 lb 10.4 oz (113.7 kg)   SpO2 93%   BMI 27.54 kg/m²   General appearance: alert and cooperative with exam  HEENT: Head: Normal, normocephalic, atraumatic.   Neck: no adenopathy, no carotid bruit, no JVD, supple, symmetrical, trachea midline and thyroid not enlarged, symmetric, no tenderness/mass/nodules  Lungs: diminished breath sounds bilaterally  Heart: Heart sounds are distant  Abdomen: Obese, bowel sounds present  Extremities: Homans sign is negative, no sign of DVT  Neurologic: Mental status: Alert, oriented, thought content appropriate    EKG: Sinus rhythm, multifocal PAC, PVC, unchanged from previous tracings. ECHO: reviewed.    Ejection fraction: 50%, dilated right ventricle, D-shaped septum, RVSP 36    Assessment / Acute Cardiac Problems:   Supraventricular arrhythmias  Frequent PAC, occasional PVC, occasional atrial run  Ejection fraction 50 to 55%, dilated right ventricle, D-shaped septum, RVSP 36, 2D echo 8/4/2020  Severe COPD, silicosis, oxygen dependent  Hypothyroidism    Patient Active Problem List:     Myopathy     Essential hypertension     Depressive disorder, not elsewhere classified     Impotence of organic origin     Hypothyroidism     Insomnia     Pulmonary fibrosis (HCC)     Bronchiectasis ([address]     Hernia, umbilical     Current chronic use of systemic steroids     Pneumoconiosis ([address]     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

## 2020-08-06 NOTE — PROGRESS NOTES
PULMONARY PROGRESS NOTE:      Interval History:Hypoxia, Silicosis    Shortness of Breath: yes   Cough: yes  Sputum: yes, whitish     Hemoptysis: no  Chest Pain: no  Fever: no                   Swelling Feet: yes   Headache: no                                           Nausea, Emesis, Abdominal Pain: no  Diarrhea: no         Constipation: no    Events since last visit: Working with therapy sats 76-low 80s on 5L after moving to side of bed. O2 increased to 6L with with sats in the mid 80s.      PAST MEDICAL HISTORY:      Scheduled Meds:   levalbuterol  1.25 mg Nebulization Q4H While awake    methylPREDNISolone  60 mg Intravenous Q6H    ipratropium  0.5 mg Nebulization Q4H While awake    guaiFENesin  1,200 mg Oral BID    dilTIAZem  120 mg Oral Daily    amoxicillin-clavulanate  1 tablet Oral 2 times per day    lisinopril  20 mg Oral Daily    furosemide  20 mg Intravenous Daily    budesonide-formoterol  2 puff Inhalation BID    DULoxetine  30 mg Oral Daily    levothyroxine  25 mcg Oral Daily    pantoprazole  40 mg Oral QAM AC    sodium chloride flush  10 mL Intravenous 2 times per day    enoxaparin  30 mg Subcutaneous BID     Continuous Infusions:  PRN Meds:perflutren lipid microspheres, albuterol, sodium chloride, sodium chloride flush, acetaminophen **OR** acetaminophen, polyethylene glycol, promethazine **OR** ondansetron, potassium chloride **OR** potassium alternative oral replacement **OR** potassium chloride, LORazepam        PHYSICAL EXAMINATION:  BP (!) 104/56   Pulse 70   Temp 98.1 °F (36.7 °C) (Oral)   Resp 20   Ht 6' 8\" (2.032 m)   Wt 250 lb 10.4 oz (113.7 kg)   SpO2 93%   BMI 27.54 kg/m²     General : Awake, alert, oriented to time, place, and person  Neck - supple, no lymphadenopathy, JVD not raised  Heart - regular rhythm, S1 and S2 normal; no additional sounds heard  Lungs - Air Entry- fair bilaterally; breath sounds : expiratory wheezes, 85% on 6L  Abdomen - soft, no

## 2020-08-06 NOTE — PROGRESS NOTES
supervision  Short term goal 2: ambulate 50 ft to get around his home Indep while wife at work .   Short term goal 3: demo independence with HEP as above    PT Individual Minutes  Time In: 135 [address]  Time Out: 1142  Minutes: 29    Electronically signed by Teofilo Holley PTA on 8/6/20 at 10:16 AM EDT

## 2020-08-06 NOTE — CARE COORDINATION
Writer called [facility], at [phone]. Writer spoke to [employer]", Transferred to [facility]oz[facility] Hartman. Writer received Approval from 7/7/20 to 8/6/20    Case ID # [id number]    Writer Called, [address] at [phone], spoke to [employer]. Called In Eliquis, 5 mg, PO, 1 tab, Bid, #60 w 3 Refills, Per Dr. Joshua Nina. Cost of Above Med is $15.00. Writer will give 30 Day Free card as well. ADD: Writer notified, Derek Bennett, from [address], that , DOES NOT want him on Eliquis, for he feels, he is not in A Fib. He will cancel order.

## 2020-08-06 NOTE — PROGRESS NOTES
[address] [facility].    PROGRESS NOTE             8/6/2020    8:43 AM    Name:   Author Freeman  MRN:     [id number]     Acct:      [de-identified]   Room:   2114/2114-01  IP Day:  3  Admit Date:  8/3/2020 12:33 PM    PCP:  Opal Goins PA-C  Code Status:  Full Code    Subjective:     C/C:   Chief Complaint   Patient presents with    Shortness of Breath     Interval History Status: improved. Patient seen and examined at bedside this AM.  No acute events overnight. Patient is awaiting input from cardiology for discharge. Cardiology thinks that the patient was never truly in atrial fibrillation and is awaiting copies of patient's outpatient ECG to determine if patient truly needs anticoagulation or not. Tolerating 5 L of nasal cannula this a.m., patient says that his shortness of breath remains at baseline. Tolerated p.o. intake with no nausea or vomiting. Patient also had ECG recordings on his phone for the month of July that he shared with us during AM rounds with our attending. These shows multiple PACs but no atrial fibrillation. Later this AM when patient got up with nurses his O2 sats dropped to 70% and his O2 was increased to 6L. Pulmonology administered another IV dose of solumedrol. Possible discharge tomorrow now. Brief History:     Please see H&P. Review of Systems:     Review of Systems   Constitutional: Negative for chills and fever. Eyes: Negative for visual disturbance. Respiratory: Positive for cough (chronic with phlegm) and shortness of breath (chronic, no change). Cardiovascular: Positive for leg swelling. Negative for chest pain and palpitations. Gastrointestinal: Negative for abdominal distention, abdominal pain, constipation, diarrhea, nausea and vomiting. Genitourinary: Negative for hematuria. Neurological: Negative for dizziness, syncope and light-headedness.    Psychiatric/Behavioral: Negative for dysphoric mood and sleep disturbance. Medications: Allergies:  No Known Allergies    Current Meds:   Scheduled Meds:    ipratropium  0.5 mg Nebulization Q4H While awake    predniSONE  20 mg Oral Daily    guaiFENesin  1,200 mg Oral BID    dilTIAZem  120 mg Oral Daily    amoxicillin-clavulanate  1 tablet Oral 2 times per day    lisinopril  20 mg Oral Daily    furosemide  20 mg Intravenous Daily    budesonide-formoterol  2 puff Inhalation BID    DULoxetine  30 mg Oral Daily    levothyroxine  25 mcg Oral Daily    pantoprazole  40 mg Oral QAM AC    sodium chloride flush  10 mL Intravenous 2 times per day    enoxaparin  30 mg Subcutaneous BID     Continuous Infusions:   PRN Meds: levalbuterol, perflutren lipid microspheres, albuterol, sodium chloride, sodium chloride flush, acetaminophen **OR** acetaminophen, polyethylene glycol, promethazine **OR** ondansetron, potassium chloride **OR** potassium alternative oral replacement **OR** potassium chloride, LORazepam    Data:     Past Medical History:   has a past medical history of Depressive disorder, not elsewhere classified, Impotence of organic origin, Insomnia, unspecified, Myopathy, unspecified, Postinflammatory pulmonary fibrosis ([address] Unspecified essential hypertension, and Unspecified hypothyroidism. Social History:   reports that he quit smoking about 15 years ago. He has a 3.75 pack-year smoking history. He has never used smokeless tobacco. He reports current alcohol use. He reports that he does not use drugs.      Family History:   Family History   Problem Relation Age of Onset    High Blood Pressure Mother     Cancer Mother     Stroke Mother     High Blood Pressure Father     Cancer Sister     Heart Disease Other         Family in general       Vitals:  BP (!) 104/56   Pulse 70   Temp 98.1 °F (36.7 °C) (Oral)   Resp 20   Ht 6' 8\" (2.032 m)   Wt 250 lb 10.4 oz (113.7 kg)   SpO2 93%   BMI 27.54 kg/m²   Temp (24hrs), Av.8 edema, ARDS, or multifocal infection. Poorly defined aortic arch. While this likely reflects technique, consideration to follow-up CT imaging. Physical Examination:        Physical Exam  Constitutional:       General: He is not in acute distress. HENT:      Head: Normocephalic and atraumatic. Mouth/Throat:      Mouth: Mucous membranes are moist.   Eyes:      Extraocular Movements: Extraocular movements intact. Conjunctiva/sclera: Conjunctivae normal.      Pupils: Pupils are equal, round, and reactive to light. Neck:      Vascular: No carotid bruit or JVD. Cardiovascular:      Rate and Rhythm: Normal rate. Rhythm irregular. Pulses: Normal pulses. Heart sounds: No murmur. No gallop. Pulmonary:      Breath sounds: Rales: bilaterally in the lower lung bases. Comments: Respirations are symmetric bilaterally. Crackles heard at bilateral lung bases. Tolerating 5L of O2 on nasal cannula. Abdominal:      General: There is no distension. Palpations: Abdomen is soft. Tenderness: There is no abdominal tenderness. Hernia: A hernia (umbilical hernia) is present. Musculoskeletal: Normal range of motion. General: Signs of injury (right lower extremity dog injury wrapped, mild erythema) present. Comments: Mild edema above the ankles bilaterally. Skin:     General: Skin is warm and dry. Nails: There is clubbing.    Psychiatric:         Mood and Affect: Mood normal.         Behavior: Behavior normal.           Assessment:        Primary Problem  <principal problem not specified>    Active Hospital Problems    Diagnosis Date Noted    Atrial fibrillation [facility]) [I48.91] 08/03/2020       Plan:        Possible new-onset atrial fibrillation versus other supraventricular arrhythmia  - EKG done in office visit day of admission showing atrial fibrillation; repeat EKG in ED showed sinus rhythm with PVCs and PACs; patient had EKG recordings on his phone that also showed PACs but no atrial fibrillation that he recorded for the month of July  - Cardiology on board, decreased lisinopril to 20 mg and lasix to 20 mg daily  - Cardiology is currently waiting on ECG results from patient's outpatient visit to determine if patient truly had atrial fibrillation and whether or not he needs anticoagulation  - BNP on admission was 868  - Echo done 2018 showed LVEF of 45-50%  - Magnesium on admission was 2.1, patient has had repeat PVCs/PACs on rhythm strips, given 2 g of magnesium; continue to monitor potassium  - Repeat echo done on 8/4 showed LVEF 50%, mild to moderate LVH, D-sign indicating RV pressure/volume overload; dilated right ventricle with reduced systolic function    Chronic respiratory failure with hypoxemia secondary to pulmonary fibrosis and silicosis  - Diagnosed with silicosis in 8898, lung biopsy in 7/2019  - On home oxygen 4L, continue oxygen therapy here; currently tolerating 5 L on nasal cannula  - Continue home medication of albuterol and budesonide  - Pulmonology on board  -Yesterday pulmonology gave a dose of IV Solu-Medrol and increased patient's home medication of prednisone to 20 mg a day from 10 mg a day; repeat dose of IV solumedrol today due to episode of hypoxemia    Systolic and diastolic CHF  - Echo done on 8/4 showed LVEF 50%, mild to moderate LVH, D-shaped septum indicating RV pressure/volume overload; dilated right ventricle with reduced systolic function  - Cardiology on board, on lisinopril, lasix, amlodipine already     Right lower extremity wound  - From dog scratch, s/p closure with stitches on 7/30  - Continue augmetin    Hypothyroidism  - Continue 25 mg levothyroxine  - TSH 1.44 (normal) in ED    HTN  - Continue home medications: amlodipine, benazapril, and lasix    Major depression  - Continue cymbalta  - No suicidal ideation    Insomnia  - Zolpidem nightly at home, given ativan here    VTE prophylaxis: levenox 30 mg daily  PT/OT  Dispo: possible discharge tomorrow to home with home health care and outpatient Julio Cesar Arzola MD  8/6/2020  8:43 AM     Attending Physician Statement    I have discussed the case of Vince Sanderson, including pertinent history and exam findings with the resident. I have seen and examined the patient and the key elements of the encounter have been performed by me. I agree with the assessment, plan, and orders as documented by the resident. Patient had episode of desaturation yesterday and he was started on intravenous steroids. He was also noted per cardiology that he does not have atrial fibrillation.   His basic rhythm is sinus and he has frequent PACs and PVCs  Electronically signed by Rich Smith MD on 8/6/2020 at 2:00 PM

## 2020-08-06 NOTE — PLAN OF CARE
Problem: Breathing Pattern - Ineffective:  Goal: Ability to achieve and maintain a regular respiratory rate will improve  Description: Ability to achieve and maintain a regular respiratory rate will improve  8/6/2020 0311 by Jordan Au RN  Outcome: Ongoing     Problem: Falls - Risk of:  Goal: Will remain free from falls  Description: Will remain free from falls  8/6/2020 0311 by Jordan Au RN  Outcome: Ongoing     Problem: Skin Integrity:  Goal: Will show no infection signs and symptoms  Description: Will show no infection signs and symptoms  8/6/2020 0311 by Jordan Au RN  Outcome: Ongoing     Problem: Pain:  Goal: Pain level will decrease  Description: Pain level will decrease  8/6/2020 0311 by Jordan Au RN  Outcome: Ongoing     Pt needs and pain assessed on hourly rounds. No new skin breakdown. Head to toe charted. Fall precautions in place.

## 2020-08-07 NOTE — PLAN OF CARE
Problem: Breathing Pattern - Ineffective:  Goal: Ability to achieve and maintain a regular respiratory rate will improve  Description: Ability to achieve and maintain a regular respiratory rate will improve  8/7/2020 0422 by Vicenta Alvarez RN  Outcome: Ongoing     Problem: Falls - Risk of:  Goal: Will remain free from falls  Description: Will remain free from falls  8/7/2020 0422 by Vicenta Alvarez RN  Outcome: Ongoing     Problem: Skin Integrity:  Goal: Will show no infection signs and symptoms  Description: Will show no infection signs and symptoms  8/7/2020 0422 by Vicenta Alvarez RN  Outcome: Ongoing     Problem: Pain:  Goal: Pain level will decrease  Description: Pain level will decrease  8/7/2020 0422 by Vicenta Alvarez RN  Outcome: Ongoing     Breathing pattern remains stable. 4L NC used to maintain o2 stats. Solumedrol IV started today. Needs and pain assessed on rounds. Head to toe assessment charted.

## 2020-08-07 NOTE — PROGRESS NOTES
PULMONARY PROGRESS NOTE:      Interval History:Hypoxia, Silicosis    Shortness of Breath: yes   Cough: yes  Sputum: yes, whitish     Hemoptysis: no  Chest Pain: no  Fever: no                   Swelling Feet: yes   Headache: no                                           Nausea, Emesis, Abdominal Pain: no  Diarrhea: no         Constipation: no    Events since last visit: breathing better; coughing up some phlegm    PAST MEDICAL HISTORY:      Scheduled Meds:   ipratropium  0.5 mg Nebulization Q4H WA    levalbuterol  1.25 mg Nebulization Q4H WA    methylPREDNISolone  40 mg Intravenous Q8H    guaiFENesin  1,200 mg Oral BID    dilTIAZem  120 mg Oral Daily    amoxicillin-clavulanate  1 tablet Oral 2 times per day    lisinopril  20 mg Oral Daily    furosemide  20 mg Intravenous Daily    budesonide-formoterol  2 puff Inhalation BID    DULoxetine  30 mg Oral Daily    levothyroxine  25 mcg Oral Daily    pantoprazole  40 mg Oral QAM AC    sodium chloride flush  10 mL Intravenous 2 times per day    enoxaparin  30 mg Subcutaneous BID     Continuous Infusions:  PRN Meds:perflutren lipid microspheres, albuterol, sodium chloride, sodium chloride flush, acetaminophen **OR** acetaminophen, polyethylene glycol, promethazine **OR** ondansetron, potassium chloride **OR** potassium alternative oral replacement **OR** potassium chloride, LORazepam        PHYSICAL EXAMINATION:  BP (!) 118/56   Pulse 65   Temp 97.8 °F (36.6 °C) (Oral)   Resp 16   Ht 6' 8\" (2.032 m)   Wt 254 lb 6.6 oz (115.4 kg)   SpO2 91%   BMI 27.95 kg/m²     General : Awake, alert, oriented to time, place, and person  Neck - supple, no lymphadenopathy, JVD not raised  Heart - regular rhythm, S1 and S2 normal; no additional sounds heard  Lungs - Air Entry- fair bilaterally; breath sounds : expiratory wheezes, O2 5L  Abdomen - soft, no tenderness  Upper Extremities  - no cyanosis, mottling; edema : absent  Lower Extremities: no cyanosis, mottling; edema : trace edema     Current Laboratory, Radiologic, Microbiologic, and Diagnostic studies reviewed  Data ReviewCBC:   No results for input(s): WBC, RBC, HGB, HCT, PLT in the last 72 hours. BMP:   Recent Labs     08/05/20  0515 08/06/20  0445 08/07/20  0451   GLUCOSE 101* 138* 164*    144 142   K 4.2 4.7 4.2   BUN 11 13 15   CREATININE 1.00 0.82 0.78   CALCIUM 8.9 9.1 8.8     ABGs: No results for input(s): PHART, PO2ART, VOB4TQK, MEB3YIZ, BEART, D1LWPNTA, DSU4ROR in the last 72 hours.    PT/INR:  No results found for: PTINR    ASSESSMENT / PLAN:  A fib- cardiology consult   Acute on chronic hypoxic respiratory failure- wean O2 to maintain sats >90%  Pulmonary Fibrosis/Silicosis- BD,   May need higher dose O2 concentrator at home   Reduce steroids, scheduled BD  Add BD, Acapella, mucinex  RLE wound- Abx  HTN    Electronically signed by Pamala Mcburney on 08/07/20 at 11:52 AM.

## 2020-08-07 NOTE — PROGRESS NOTES
[address] [facility]    [facility]ESS NOTE             8/7/2020    12:45 PM    Name:   Zaid Barron  MRN:     [id number]     Acct:      [de-identified]   Room:   2114/2114-01  IP Day:  4  Admit Date:  8/3/2020 12:33 PM    PCP:  Vianca Farley PA-C  Code Status:  Full Code    Subjective:     C/C:   Chief Complaint   Patient presents with    Shortness of Breath     Interval History Status: improved. Patient seen and examined at bedside this AM.  No acute events overnight. No more episodes of low O2 saturation since yesterday AM.  Patient since SOB is back to baseline. Denies chest pain, palpitations, nausea/vomiting, HA. Discussed that after he eats his shortness of breath increases and he has more wheezing sometimes. On pantoprazole 40 mg daily but will change to zegerid on discharge per Dr. Madelaine Hardy. Cardiology reviewed outpatient ECG and determined it was not a-fib. No need for anticoagulation. Awaiting pulmonology clearance for discharge. No other issues today. Says he feels ready to go home. Brief History:     Please see H&P. Review of Systems:     Review of Systems   Constitutional: Negative for chills and fever. Eyes: Negative for visual disturbance. Respiratory: Positive for cough (chronic with phlegm, yellow/white in color, improving) and shortness of breath (chronic, no change). Cardiovascular: Positive for leg swelling. Negative for chest pain and palpitations. Gastrointestinal: Negative for abdominal distention, abdominal pain, constipation, diarrhea, nausea and vomiting. Genitourinary: Negative for hematuria. Neurological: Negative for dizziness, syncope and light-headedness. Psychiatric/Behavioral: Negative for dysphoric mood and sleep disturbance. Medications:      Allergies:  No Known Allergies    Current Meds:   Scheduled Meds:    ipratropium  0.5 mg Nebulization Q4H WA    levalbuterol  1.25 mg Nebulization Q4H WA    methylPREDNISolone  40 mg Intravenous Q8H    guaiFENesin  1,200 mg Oral BID    dilTIAZem  120 mg Oral Daily    amoxicillin-clavulanate  1 tablet Oral 2 times per day    lisinopril  20 mg Oral Daily    furosemide  20 mg Intravenous Daily    budesonide-formoterol  2 puff Inhalation BID    DULoxetine  30 mg Oral Daily    levothyroxine  25 mcg Oral Daily    pantoprazole  40 mg Oral QAM AC    sodium chloride flush  10 mL Intravenous 2 times per day    enoxaparin  30 mg Subcutaneous BID     Continuous Infusions:   PRN Meds: perflutren lipid microspheres, albuterol, sodium chloride, sodium chloride flush, acetaminophen **OR** acetaminophen, polyethylene glycol, promethazine **OR** ondansetron, potassium chloride **OR** potassium alternative oral replacement **OR** potassium chloride, LORazepam    Data:     Past Medical History:   has a past medical history of Depressive disorder, not elsewhere classified, Impotence of organic origin, Insomnia, unspecified, Myopathy, unspecified, Postinflammatory pulmonary fibrosis ([address] Unspecified essential hypertension, and Unspecified hypothyroidism. Social History:   reports that he quit smoking about 15 years ago. He has a 3.75 pack-year smoking history. He has never used smokeless tobacco. He reports current alcohol use. He reports that he does not use drugs. Family History:   Family History   Problem Relation Age of Onset    High Blood Pressure Mother     Cancer Mother     Stroke Mother     High Blood Pressure Father     Cancer Sister     Heart Disease Other         Family in general       Vitals:  BP (!) 118/56   Pulse 65   Temp 97.8 °F (36.6 °C) (Oral)   Resp 16   Ht 6' 8\" (2.032 m)   Wt 254 lb 6.6 oz (115.4 kg)   SpO2 91%   BMI 27.95 kg/m²   Temp (24hrs), Av.6 °F (36.4 °C), Min:96.9 °F (36.1 °C), Max:98.2 °F (36.8 °C)    No results for input(s): POCGLU in the last 72 hours. I/O(24Hr):     Intake/Output Summary (Last 24 hours) at 8/7/2020 1245  Last data filed at 8/7/2020 0910  Gross per 24 hour   Intake 400 ml   Output --   Net 400 ml       Labs:    CBC with Differential:    Lab Results   Component Value Date    WBC 8.7 08/04/2020    RBC 4.82 08/04/2020    RBC 4.54 12/28/2011    HGB 14.1 08/04/2020    HCT 44.8 08/04/2020     08/04/2020     12/28/2011    MCV 93.0 08/04/2020    MCH 29.4 08/04/2020    MCHC 31.6 08/04/2020    RDW 15.7 08/04/2020    LYMPHOPCT 12 08/04/2020    MONOPCT 11 08/04/2020    BASOPCT 1 08/04/2020    MONOSABS 1.00 08/04/2020    LYMPHSABS 1.10 08/04/2020    EOSABS 0.60 08/04/2020    BASOSABS 0.10 08/04/2020    DIFFTYPE NOT REPORTED 08/04/2020     BMP:    Lab Results   Component Value Date     08/07/2020    K 4.2 08/07/2020     08/07/2020    CO2 35 08/07/2020    BUN 15 08/07/2020    LABALBU 3.1 08/03/2020    CREATININE 0.78 08/07/2020    CALCIUM 8.8 08/07/2020    GFRAA >60 08/07/2020    LABGLOM >60 08/07/2020    GLUCOSE 164 08/07/2020    GLUCOSE 157 12/28/2011       Lab Results   Component Value Date/Time    SPECIAL  12/26/2011 07:42 PM     RIGHT ANTECUBITAL Performed at [facility]     Lab Results   Component Value Date/Time    CULTURE NO GROWTH 6 DAYS 12/26/2011 07:42 PM    CULTURE  Performed at [facility] 12/26/2011 07:42 PM         Radiology:    Xr Chest Portable    Result Date: 8/4/2020  EXAMINATION: ONE X-RAY VIEW OF THE CHEST 8/3/2020 1:14 pm COMPARISON: December 26, 2011 HISTORY: ORDERING SYSTEM PROVIDED HISTORY: SOB TECHNOLOGIST PROVIDED HISTORY: SOB Reason for Exam: Patient states SOB 2 days Acuity: Acute Type of Exam: Initial FINDINGS: Extensive multifocal bilateral interstitial and airspace disease confluent throughout the mid and lower lungs. The heart is enlarged. The aorta is not well defined. Cardiomegaly with extensive bilateral airspace disease representing any form of edema, ARDS, or multifocal infection. Poorly defined aortic arch.   While this likely reflects technique, consideration to follow-up CT imaging. Physical Examination:        Physical Exam  Constitutional:       General: He is not in acute distress. HENT:      Head: Normocephalic and atraumatic. Mouth/Throat:      Mouth: Mucous membranes are moist.   Eyes:      Extraocular Movements: Extraocular movements intact. Conjunctiva/sclera: Conjunctivae normal.      Pupils: Pupils are equal, round, and reactive to light. Neck:      Vascular: No carotid bruit or JVD. Cardiovascular:      Rate and Rhythm: Normal rate. Rhythm irregular. Pulses: Normal pulses. Heart sounds: No murmur. No gallop. Pulmonary:      Breath sounds: Rales: bilaterally in the lower lung bases. Comments: Respirations are symmetric bilaterally. Crackles heard at bilateral lung bases, more on the left today. Tolerating 6L of O2 on nasal cannula. Abdominal:      General: There is no distension. Palpations: Abdomen is soft. Tenderness: There is no abdominal tenderness. Hernia: A hernia (umbilical hernia) is present. Musculoskeletal: Normal range of motion. General: Signs of injury (right lower extremity dog injury wrapped, mild erythema) present. Comments: Mild edema above the ankles bilaterally. Skin:     General: Skin is warm and dry. Nails: There is clubbing.    Psychiatric:         Mood and Affect: Mood normal.         Behavior: Behavior normal.           Assessment:        Primary Problem  <principal problem not specified>    Active Hospital Problems    Diagnosis Date Noted    Atrial fibrillation [facility]) [I48.91] 08/03/2020       Plan:        Supraventricular arhythmia with multiple PACs and PVCs  - EKG done in office visit day of admission showing atrial fibrillation; repeat EKG in ED showed sinus rhythm with PVCs and PACs; patient had EKG recordings on his phone that also showed PACs but no atrial fibrillation that he recorded for the month of July; cardiology reviewed outpatient ECG and determined it is not a-fib; no need for anticoagulation  - BNP on admission was 868  - Echo done 2018 showed LVEF of 45-50%  - Magnesium on admission was 2.1, patient has had repeat PVCs/PACs on rhythm strips, given 2 g of magnesium; continue to monitor potassium  - Repeat echo done on 8/4 showed LVEF 50%, mild to moderate LVH, D-sign indicating RV pressure/volume overload; dilated right ventricle with reduced systolic function    Chronic respiratory failure with hypoxemia secondary to pulmonary fibrosis and silicos  - Diagnosed with silicosis in 9817, lung biopsy in 7/2019  - On home oxygen 4L, continue oxygen therapy here; currently tolerating 5 L on nasal cannula  - Continue home medication of albuterol and budesonide  - Pulmonology on board, increased prednisone back to 20 mg daily and restarted IV solumedrol yesterday  - Awaiting pulmonology clearance for discharge    Systolic and diastolic CHF  - Echo done on 8/4 showed LVEF 50%, mild to moderate LVH, D-shaped septum indicating RV pressure/volume overload; dilated right ventricle with reduced systolic function  - Cardiology on board, on lisinopril, lasix, amlodipine already     Right lower extremity wound  - From dog scratch, s/p closure with stitches on 7/30  - Continue augmetin    Hypothyroidism  - Continue 25 mg levothyroxine  - TSH 1.44 (normal) in ED    HTN  - Continue home medications: amlodipine, benazapril, and lasix    Major depression  - Continue cymbalta  - No suicidal ideation    Insomnia  - Zolpidem nightly at home, given ativan here    VTE prophylaxis: levenox 30 mg daily  PT/OT  Dispo: possible discharge with home health care and outpatient Julio Cesar Arzola MD  8/7/2020  12:45 PM   Attending Physician Statement    I have discussed the case of Author Lydia, including pertinent history and exam findings with the resident.  I have seen and examined the patient and the key elements of the encounter have been performed by me. I agree with the assessment, plan, and orders as documented by the resident.   Is my impression that it is okay to discharge him on present therapy  Electronically signed by Deirdre Muñiz MD on 8/7/2020 at 2:23 PM

## 2020-08-07 NOTE — PLAN OF CARE
Problem: Breathing Pattern - Ineffective:  Goal: Ability to achieve and maintain a regular respiratory rate will improve  Description: Ability to achieve and maintain a regular respiratory rate will improve  8/7/2020 1644 by Fabian Bonilla RN  Outcome: Ongoing  Note: Pt still on 6L of nasal cannula. Home 02 is 4L nasal cannula. 8/7/2020 0422 by Angel Wong RN  Outcome: Ongoing     Problem: Falls - Risk of:  Goal: Will remain free from falls  Description: Will remain free from falls  8/7/2020 1644 by Fabian Bonilla RN  Outcome: Ongoing  Note: Pt remained free from falls this shift. Call light within reach. Bed in lowest position. 8/7/2020 0422 by Angel Wong RN  Outcome: Ongoing  Goal: Absence of physical injury  Description: Absence of physical injury  8/7/2020 1644 by Fabian Bonilla RN  Outcome: Ongoing  8/7/2020 0422 by Angel Wong RN  Outcome: Ongoing     Problem: Skin Integrity:  Goal: Will show no infection signs and symptoms  Description: Will show no infection signs and symptoms  8/7/2020 1644 by Fabian Bonilla RN  Outcome: Ongoing  Note: Pt has dog scratch on right lower leg. RN changed dressing this shift. No other signs on skin breakdown. 8/7/2020 0422 by Angel Wong RN  Outcome: Ongoing  Goal: Absence of new skin breakdown  Description: Absence of new skin breakdown  8/7/2020 1644 by Fabian Bonilla RN  Outcome: Ongoing  8/7/2020 0422 by Angel Wong RN  Outcome: Ongoing     Problem: Pain:  Goal: Pain level will decrease  Description: Pain level will decrease  8/7/2020 1644 by Fabian Bonilla RN  Outcome: Ongoing  Note: Pt having no complaints of pain this shift.    8/7/2020 0422 by Angel Wong RN  Outcome: Ongoing  Goal: Control of acute pain  Description: Control of acute pain  8/7/2020 1644 by Fabian Bonilla RN  Outcome: Ongoing  8/7/2020 0422 by Angel Wong RN  Outcome: Ongoing  Goal: Control of chronic pain  Description: Control of chronic pain  8/7/2020 1644 by Liyah Felix RN  Outcome: Ongoing  8/7/2020 0422 by Dennis Perez RN  Outcome: Ongoing

## 2020-08-07 NOTE — PROGRESS NOTES
Physical Therapy  Facility/Department: [facility] PROGRESSIVE CARE  Daily Treatment Note  NAME: Arminda Corado  : 1955  MRN: [id number]    Date of Service: 2020    Discharge Recommendations:  Patient would benefit from continued therapy after discharge      Assessment   Body structures, Functions, Activity limitations: Decreased functional mobility ; Decreased endurance  Treatment Diagnosis: difficulty walking  Patient Education: provided patient with energy conservation hand out and discussed breathing techniques. Provided patient with american lung association website/phone number for more information and support group information. Barriers to Learning: None Known  REQUIRES PT FOLLOW UP: Yes  Activity Tolerance  Activity Tolerance: Patient limited by endurance     Patient Diagnosis(es): The primary encounter diagnosis was Atrial fibrillation, unspecified type ([facility] [address] A diagnosis of Silicosis ([facility] [address] was also pertinent to this visit. has a past medical history of Depressive disorder, not elsewhere classified, Impotence of organic origin, Insomnia, unspecified, Myopathy, unspecified, Postinflammatory pulmonary fibrosis ([address] Unspecified essential hypertension, and Unspecified hypothyroidism. has a past surgical history that includes Lung biopsy (Left). Restrictions  Restrictions/Precautions  Restrictions/Precautions: Up as Tolerated  Required Braces or Orthoses?: No  Subjective   General  Chart Reviewed: Yes  Family / Caregiver Present: No  Subjective  Subjective: Patient up in chair upon arrival, reports he was just in bathroom and needed a second to recover.   General Comment  Comments: Patient on 6L O2  Pain Screening  Patient Currently in Pain: Denies  Vital Signs  Patient Currently in Pain: Denies       Orientation  Orientation  Overall Orientation Status: Within Normal Limits  Cognition      Objective   Bed mobility  Rolling to Left: Independent  Rolling to Right: Independent  Supine to Sit: Independent  Sit to Supine: Independent  Scooting: Independent  Comment: Verbal cues to slow pace and conserve energy  Transfers  Sit to Stand: Independent  Stand to sit: Independent  Bed to Chair: Independent  Comment: Patient somewhat impulsive, cues to slow pace and focus on breathing  Ambulation  Ambulation?: Yes  Ambulation 1  Surface: level tile  Device: No Device  Assistance: Supervision  Gait Deviations: Increased ALFIE  Distance: 15 ft  Comments: No LOB, somewhat impulsive, cues to slow pace. SpO2 88% on 6L O2 post ambulation. Stairs/Curb  Stairs?: Yes  Stairs  # Steps : 2  Stairs Height: 4\"  Rails: None  Assistance: Contact guard assistance  Comment: slightly unsteady, cues for breathing techniques, requires seated break after stair negotation, has 1 step into house. SpO2 87%        Other exercises  Other exercises?: Yes  Other exercises 1: Seated (B) LE AROM x10 (cues for slow and controlled movements)      Other activities: Patient requires frequent rest breaks to recover from activities. Goals  Short term goals  Time Frame for Short term goals: 10 visits  Short term goal 1: walk up/down 1 step with cristin HR supervision  Short term goal 2: ambulate 50 ft to get around his home Indep while wife at work . Short term goal 3: demo independence with HEP as above  Patient Goals   Patient goals : I want to get home health PT and breathing treatments.      Plan    Plan  Times per week: 5-6  Times per day: Daily  Plan weeks: 2  Specific instructions for Next Treatment: Attempt ambulation, review HEP  Current Treatment Recommendations: Functional Mobility Training, Endurance Training, Gait Training  Safety Devices  Type of devices: Call light within reach, Left in bed, Nurse notified     Therapy Time   Individual Concurrent Group Co-treatment   Time In 1005         Time Out 1033         Minutes [address] [facility] [place], [place]

## 2020-08-07 NOTE — PROGRESS NOTES
Physician Progress Note      PATIENT:               Tawanda Rock  CSN #:                  [id number]  :                       1955  ADMIT DATE:       8/3/2020 12:33 PM  [address] [place] DATE:  RESPONDING  PROVIDER #:        Pérez Welch MD          QUERY TEXT:    Pt admitted with new onset A-fib and has Systolic & Diastolic CHF documented. If possible, please document in progress notes and discharge summary further   specificity regarding the acuity of CHF:    The medical record reflects the following:  Risk Factors: 60 yr old male, HTN, Resp failure, Afib  Clinical Indicators: , Per notes Systolic and diastolic CHF, Echo done   on  showed LVEF 50%, mild to moderate LVH, D-shaped septum indicating RV   pressure/volume overload; dilated right ventricle with reduced systolic   function  Treatment: Admit, Cardio consult, echo, lisinopril, IV Lasix, Norvasc, I&O    Thank you, Shelia PERAZA RN MSN, CDS  Please call with any questions [phone]  Options provided:  -- Acute on Chronic Systolic and Diastolic CHF  -- Acute Systolic and Diastolic CHF  -- Chronic Systolic and Diastolic CHF  -- Other - I will add my own diagnosis  -- Disagree - Not applicable / Not valid  -- Disagree - Clinically unable to determine / Unknown  -- Refer to Clinical Documentation Reviewer    PROVIDER RESPONSE TEXT:    This patient has chronic systolic and diastolic CHF. Query created by:  Grayson Yoedr on 2020 1:49 PM      Electronically signed by:  Pérez Welch MD 2020 2:25 PM

## 2020-08-07 NOTE — CARE COORDINATION
ONGOING DISCHARGE PLAN:    Spoke with patient regarding discharge plan and patient confirms that plan is still to return to home w/ Wife & VNS, [facility]. Pt. Remains on IV Lasix, Bid & IV steroids, 60MG, Q6.     Pulmonary continues to follow. Pt. Is 90% on 6LNC, will most likely need another home O2 eval & follow for a higher capacity Concentrator, for his current one only goes up to [facility]. [facility], is following for New Nebulizer & they are also current w/ his Oxygen needs. Per [facility], No clear indication for Eliquis. Pt. Continues on PO Cardizem. Will continue to follow for additional discharge needs.     Electronically signed by Bebeto Restrepo RN on 8/7/2020 at 10:34 AM

## 2020-08-07 NOTE — PROGRESS NOTES
Date:   8/7/2020  Patient name: Geri Lance  Date of admission:  8/3/2020 12:33 PM  MRN:   [id number]  YOB: 1955  PCP: Renee Porter PA-C    Reason for Admission: Atrial fibrillation [facility]) [I48.91]    Cardiology follow-up: Atrial fibrillation/multifocal frequent PACs, atrial runs          Impression      Supraventricular arrhythmia  CHF systolic and diastolic  Moderate LVH  Pulmonary fibrosis, chronic hypoxia, oxygen dependent  History of silicosis  Myopathy  Hypothyroidism  Essential hypertension  History of depression     ECG 8/3/2020 sinus rhythm, multifocal PACs, PVCs, ventricular couplets, incomplete right bundle branch block  Chest x-ray 8/3/2020 :Cardiomegaly with extensive bilateral airspace disease representing any form of edema, ARDS or multifocal infection     2D echo 5/26/2018:  Normal LV size, moderate LVH ejection fraction 45 to 50%, dilated RV with mildly reduced function, no significant valvular abnormality, trivial pericardial effusion     Lab work on admission sodium 141, potassium 4.0, BUN 10, creatinine 0.82  proBNP 868, high-sensitivity troponin 15 and 15  Hemoglobin 4.6, WBC 10.0, platelets 872  TSH 7.02  Lab work 8/7/2020 sodium 142, potassium 4.2, BUN 15, creatinine 0.78, glucose 164     History of present illness  46-year-old male who has past medical history of pulmonary fibrosis, silicosis, pulmonary hypertension got admitted on 8/30/2020 with a diagnosis of new onset A. Fib.  He went to see his physician and a he was very short of breath and hypoxic and electrocardiogram showed A. fib with RVR.  He did not have chest pain or syncope.  His ECG in this hospital showed sinus rhythm, frequent PAC, occasional PVC.  No previous history of A. Fib.  No previous history of coronary intervention.  He has severely limited mobility secondary to his chronic pulmonary pathology.  He does get swelling over the ankles now and again and he takes diuretics as needed    Current evaluation 8/7/2020    Patient was resting in upright position, on oxygen with the nasal cannula  He said he walked very well  He denied any chest pain or palpitation no dizziness  Hemodynamically stable  ECG monitor A. fib, frequent multifocal PAC, occasional PVC      Medications:   Scheduled Meds:   ipratropium  0.5 mg Nebulization Q4H WA    levalbuterol  1.25 mg Nebulization Q4H WA    methylPREDNISolone  40 mg Intravenous Q8H    guaiFENesin  1,200 mg Oral BID    dilTIAZem  120 mg Oral Daily    amoxicillin-clavulanate  1 tablet Oral 2 times per day    lisinopril  20 mg Oral Daily    furosemide  20 mg Intravenous Daily    budesonide-formoterol  2 puff Inhalation BID    DULoxetine  30 mg Oral Daily    levothyroxine  25 mcg Oral Daily    pantoprazole  40 mg Oral QAM AC    sodium chloride flush  10 mL Intravenous 2 times per day    enoxaparin  30 mg Subcutaneous BID     Continuous Infusions:  CBC: No results for input(s): WBC, HGB, PLT in the last 72 hours. BMP:    Recent Labs     08/05/20  0515 08/06/20  0445 08/07/20  0451    144 142   K 4.2 4.7 4.2    101 101   CO2 38* 35* 35*   BUN 11 13 15   CREATININE 1.00 0.82 0.78   GLUCOSE 101* 138* 164*     Hepatic: No results for input(s): AST, ALT, ALB, BILITOT, ALKPHOS in the last 72 hours. Troponin: No results for input(s): TROPONINI in the last 72 hours. BNP: No results for input(s): BNP in the last 72 hours. Lipids: No results for input(s): CHOL, HDL in the last 72 hours. Invalid input(s): LDLCALCU  INR: No results for input(s): INR in the last 72 hours. Objective:   Vitals: /64   Pulse 86   Temp 98.6 °F (37 °C) (Oral)   Resp 24   Ht 6' 8\" (2.032 m)   Wt 254 lb 6.6 oz (115.4 kg)   SpO2 94%   BMI 27.95 kg/m²   General appearance: alert and cooperative with exam  HEENT: Head: Normal, normocephalic, atraumatic.   Neck: no adenopathy, no carotid bruit, no JVD, supple, symmetrical, trachea midline and thyroid not enlarged, symmetric, no tenderness/mass/nodules  Lungs: Chest expansion is poor, breath sounds are diminished both side, basal crackles  Heart: Cardiac apical impulse not palpable, heart sounds distant  Abdomen: soft, non-tender; bowel sounds normal; no masses,  no organomegaly  Extremities: Homans sign is negative, no sign of DVT  Neurologic: Mental status: Alert, oriented, thought content appropriate    EKG: Sinus rhythm with frequent multifocal PAC, occasional PVC. ECHO: reviewed.    Ejection fraction: 50%, dilated RV, D-shaped septum, RVSP 36    Assessment / Acute Cardiac Problems:   Supraventricular arrhythmias  Frequent PAC, occasional PVC, occasional atrial run  Ejection fraction 50 to 55%, dilated right ventricle, D-shaped septum, RVSP 36, 2D echo 8/4/2020  Severe COPD, silicosis, oxygen dependent  Hypothyroidism    Patient Active Problem List:     Myopathy     Essential hypertension     Depressive disorder, not elsewhere classified     Impotence of organic origin     Hypothyroidism     Insomnia     Pulmonary fibrosis (HCC)     Bronchiectasis ([address]     Hernia, umbilical     Current chronic use of systemic steroids     Pneumoconiosis ([address]     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

## 2020-08-08 NOTE — PROGRESS NOTES
Pulmonary Critical Care Progress Note  Flory Hdez MD     Patient seen for the follow up of acute on chronic hypoxic respiratory failure, acute exacerbation of pulmonary fibrosis, silicosis, suspected obstructive sleep apnea, A. fib     Subjective:  Patient has productive cough with clear sputum. He denies any chest pain. His shortness of breath slightly better. He is tolerating orals. He had uneventful night. Examination:  Vitals: /67   Pulse 55   Temp 97.7 °F (36.5 °C) (Oral)   Resp 22   Ht 6' 8\" (2.032 m)   Wt 254 lb 6.6 oz (115.4 kg)   SpO2 91%   BMI 27.95 kg/m²   General appearance:  alert and cooperative with exam  Neck: No JVD  Lungs: Bilateral crackles, moderate air exchange. Heart: regular rate and rhythm, S1, S2 normal, no gallop  Abdomen: Soft, non tender, + BS  Extremities: no cyanosis or clubbing. No significant edema    LABs:  CBC: No results for input(s): WBC, HGB, HCT, PLT in the last 72 hours. BMP:   Recent Labs     08/07/20  0451 08/08/20  0436    142   K 4.2 4.3   CO2 35* 34*   BUN 15 22   CREATININE 0.78 0.78   LABGLOM >60 >60   GLUCOSE 164* 160*     Radiology:  8/8/2020      Impression:  · Acute on chronic hypoxic respiratory failure  · Acute exacerbation of pulmonary fibrosis/silicosis  · Atelectasis  · A.  Fib  · Suspected obstructive sleep apnea    Recommendations:  · Continue Augmentin  · IV solu-medrol, no taper today  · Mucinex  · Xopenex and Ipratropium Q 4 hours and prn  · Symbicort  · IV Lasix  · Increase ambulation  · Incentive spirometry every hour while awake  · X-ray chest in am  · Labs: CBC and BMP in am  · BiPAP, while asleep and as needed  · Sleep apnea while he has an outpatient  · 4-6 liters/min via nasal cannula, patient will need reevaluation of her oxygen requirement at the time of discharge  · DVT prophylaxis with low molecular weight heparin  · Will follow with you    Flory Hdez MD, Ginny Tse  Pulmonary Critical Care and Sleep Medicine,  [facility] [place]  Cell: [phone]  Office: [phone]

## 2020-08-08 NOTE — PLAN OF CARE
Problem: Breathing Pattern - Ineffective:  Goal: Ability to achieve and maintain a regular respiratory rate will improve  Description: Ability to achieve and maintain a regular respiratory rate will improve  8/8/2020 1930 by Sandrine Ramirez RN  Outcome: Met This Shift  Note: Resp rate WNL. SOB continues with any exertion. Pt stated \"SOB has improved since being here\". Congested cough, productive at times, noted this shift. O2 continues at 5L/NC up to 6lL/NC prn. Problem: Falls - Risk of:  Goal: Will remain free from falls  Description: Will remain free from falls  8/8/2020 1930 by Sandrine Ramirez RN  Outcome: Met This Shift  Note: No falls this shift. Up to bathroom per self without difficulty. Used call light appropriately. Problem: Skin Integrity:  Goal: Will show no infection signs and symptoms  Description: Will show no infection signs and symptoms  8/8/2020 1930 by Sandrine Ramirez RN  Outcome: Met This Shift  Note: No new skin breakdown noted. Wound on RLE healing.

## 2020-08-09 PROBLEM — I47.1 SUPRAVENTRICULAR TACHYCARDIA (HCC): Status: ACTIVE | Noted: 2020-01-01

## 2020-08-09 PROBLEM — I47.10 SUPRAVENTRICULAR TACHYCARDIA: Status: ACTIVE | Noted: 2020-01-01

## 2020-08-09 NOTE — PROGRESS NOTES
[address] [employer] [employer]    PROGRESS NOTE             8/9/2020    11:12 AM    Name:   Vince Sanderson  MRN:     [id number]     Acct:      [de-identified]   Room:   [facility] Day:  6  Admit Date:  8/3/2020 12:33 PM    PCP:  Jose Menendez PA-C  Code Status:  Full Code    Subjective:     C/C:   Chief Complaint   Patient presents with    Shortness of Breath     Interval History Status: improved. Patient seen and examined at bedside this AM.  Used BiPaP overnight and said he tolerated it well and felt like his SOB was improved this AM and would like to use BiPaP at home. Continues to have productive cough with clear/yellow phlegm. Denies any increased SOB from baseline. No chest pain, dizziness, lightheadedness. Patient tolerating PO intake. Afebrile. Continues to be on IV solumedrol 40 mg q8h. Awaiting home O2 evaluation tomorrow. Brief History:     Please see H&P. Review of Systems:     Review of Systems   Constitutional: Negative for chills, fatigue and fever. Respiratory: Positive for cough (chronic with phlegm, yellow/white in color, improving) and shortness of breath (chronic, no change). Cardiovascular: Negative for chest pain and palpitations. Gastrointestinal: Negative for abdominal distention, abdominal pain, constipation, diarrhea, nausea and vomiting. Genitourinary: Negative for hematuria. Neurological: Negative for dizziness, syncope and light-headedness. Psychiatric/Behavioral: Negative for dysphoric mood and sleep disturbance. Medications:      Allergies:  No Known Allergies    Current Meds:   Scheduled Meds:    methylPREDNISolone  30 mg Intravenous Q8H    levalbuterol  1.25 mg Nebulization Q4H    ipratropium  0.5 mg Nebulization Q4H    guaiFENesin  1,200 mg Oral BID    dilTIAZem  120 mg Oral Daily    amoxicillin-clavulanate  1 tablet Oral 2 times per day    lisinopril  20 mg Oral Daily    furosemide 20 mg Intravenous Daily    budesonide-formoterol  2 puff Inhalation BID    DULoxetine  30 mg Oral Daily    levothyroxine  25 mcg Oral Daily    pantoprazole  40 mg Oral QAM AC    sodium chloride flush  10 mL Intravenous 2 times per day    enoxaparin  30 mg Subcutaneous BID     Continuous Infusions:   PRN Meds: perflutren lipid microspheres, albuterol, sodium chloride, sodium chloride flush, acetaminophen **OR** acetaminophen, polyethylene glycol, promethazine **OR** ondansetron, potassium chloride **OR** potassium alternative oral replacement **OR** potassium chloride, LORazepam    Data:     Past Medical History:   has a past medical history of Depressive disorder, not elsewhere classified, Impotence of organic origin, Insomnia, unspecified, Myopathy, unspecified, Postinflammatory pulmonary fibrosis ([address] Unspecified essential hypertension, and Unspecified hypothyroidism. Social History:   reports that he quit smoking about 15 years ago. He has a 3.75 pack-year smoking history. He has never used smokeless tobacco. He reports current alcohol use. He reports that he does not use drugs. Family History:   Family History   Problem Relation Age of Onset    High Blood Pressure Mother     Cancer Mother     Stroke Mother     High Blood Pressure Father     Cancer Sister     Heart Disease Other         Family in general       Vitals:  BP (!) 99/53   Pulse 97   Temp 97.9 °F (36.6 °C) (Oral)   Resp 21   Ht 6' 8\" (2.032 m)   Wt 260 lb 9.3 oz (118.2 kg)   SpO2 92%   BMI 28.63 kg/m²   Temp (24hrs), Av.8 °F (36.6 °C), Min:97.3 °F (36.3 °C), Max:97.9 °F (36.6 °C)    No results for input(s): POCGLU in the last 72 hours. I/O(24Hr):     Intake/Output Summary (Last 24 hours) at 2020 1112  Last data filed at 2020 0549  Gross per 24 hour   Intake 1145 ml   Output 700 ml   Net 445 ml       Labs:    CBC with Differential:    Lab Results   Component Value Date    WBC 10.2 2020    RBC 4.77 08/09/2020    RBC 4.54 12/28/2011    HGB 14.0 08/09/2020    HCT 43.7 08/09/2020     08/09/2020     12/28/2011    MCV 91.6 08/09/2020    MCH 29.3 08/09/2020    MCHC 32.0 08/09/2020    RDW 15.2 08/09/2020    LYMPHOPCT 3 08/09/2020    MONOPCT 7 08/09/2020    BASOPCT 0 08/09/2020    MONOSABS 0.71 08/09/2020    LYMPHSABS 0.31 08/09/2020    EOSABS 0.00 08/09/2020    BASOSABS 0.00 08/09/2020    DIFFTYPE NOT REPORTED 08/09/2020     BMP:    Lab Results   Component Value Date     08/09/2020    K 4.6 08/09/2020     08/09/2020    CO2 37 08/09/2020    BUN 23 08/09/2020    LABALBU 3.1 08/03/2020    CREATININE 0.95 08/09/2020    CALCIUM 8.7 08/09/2020    GFRAA >60 08/09/2020    LABGLOM >60 08/09/2020    GLUCOSE 175 08/09/2020    GLUCOSE 157 12/28/2011       Lab Results   Component Value Date/Time    SPECIAL  12/26/2011 07:42 PM     RIGHT ANTECUBITAL Performed at [facility]     Lab Results   Component Value Date/Time    CULTURE NO GROWTH 6 DAYS 12/26/2011 07:42 PM    CULTURE  Performed at [facility] 12/26/2011 07:42 PM         Radiology:    Xr Chest Portable    Result Date: 8/4/2020  EXAMINATION: ONE X-RAY VIEW OF THE CHEST 8/3/2020 1:14 pm COMPARISON: December 26, 2011 HISTORY: ORDERING SYSTEM PROVIDED HISTORY: SOB TECHNOLOGIST PROVIDED HISTORY: SOB Reason for Exam: Patient states SOB 2 days Acuity: Acute Type of Exam: Initial FINDINGS: Extensive multifocal bilateral interstitial and airspace disease confluent throughout the mid and lower lungs. The heart is enlarged. The aorta is not well defined. Cardiomegaly with extensive bilateral airspace disease representing any form of edema, ARDS, or multifocal infection. Poorly defined aortic arch. While this likely reflects technique, consideration to follow-up CT imaging. Physical Examination:        Physical Exam  Constitutional:       General: He is not in acute distress. HENT:      Head: Normocephalic and atraumatic. Neck:      Vascular: No carotid bruit or JVD. Cardiovascular:      Rate and Rhythm: Normal rate. Rhythm irregular. Pulses: Normal pulses. Heart sounds: No murmur. No gallop. Pulmonary:      Breath sounds: Rales: bilaterally in the lower lung bases. Comments: Respirations are symmetric bilaterally. Crackles heard at bilateral lung bases, more on the left today. Tolerating 6L of O2 on nasal cannula. Abdominal:      General: There is no distension. Palpations: Abdomen is soft. Tenderness: There is no abdominal tenderness. Hernia: A hernia (umbilical hernia) is present. Musculoskeletal: Normal range of motion. General: Signs of injury (right lower extremity dog injury wrapped, mild erythema) present. Comments: Mild edema above the ankles bilaterally. Skin:     General: Skin is warm and dry. Nails: There is clubbing. Psychiatric:         Mood and Affect: Mood normal.         Behavior: Behavior normal.         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     Supraventricular arhythmia with multiple PACs and PVCs  - Cardiology on board  - EKG done in office visit day of admission showing atrial fibrillation; repeat EKG in ED showed sinus rhythm with PVCs and PACs; patient had EKG recordings on his phone that also showed PACs but no atrial fibrillation that he recorded for the month of July; cardiology reviewed outpatient ECG and determined it is not a-fib; no need for anticoagulation  - Echo done on 8/4 showed LVEF 50%, mild to moderate LVH, D-sign indicating RV pressure/volume overload; dilated right ventricle with reduced systolic function    Acute on chronic respiratory failure with hypoxemia secondary to pulmonary fibrosis and silicosis  - Diagnosed with silicosis in 5219, lung biopsy in 7/2019  - On home oxygen 4L, continue oxygen therapy here; currently tolerating 5 L on nasal cannula, pending home O2 eval tomorrow  - On ipratropium and xopenex q4h and budesonide-formeterol inhaler 2 puff q12h  - Pulmonology on board, increased home dose of prednisone to 20 mg and on IV solumedrol 40 mg q8h  - BiPaP added for overnight  - 8/8 CXR showed increased opacification of right upper lung that could be superimposed pneumonia     Systolic and diastolic CHF  - Echo done on 8/4 showed LVEF 50%, mild to moderate LVH, D-shaped septum indicating RV pressure/volume overload; dilated right ventricle with reduced systolic function  - BNP on admission was 868  - Cardiology on board, on lisinopril and lasix already, cardiology decreased lisinopril to 20 mg and changed amlodipine to diltiazem    Right lower extremity wound  - From dog scratch, s/p closure with stitches on 7/30  - Continue augmetin    Hypothyroidism  - Continue 25 mg levothyroxine  - TSH 1.44 (normal) in ED    HTN  - Continue home medications: was on amlodipine, benazapril, and lasix  - Cardiology discontinued amlodipine and started diltiazem instead and decreased dose of lisinopril to 20 mg, continue with home lasix dose    Major depression  - Continue cymbalta  - No suicidal ideation    Insomnia  - Zolpidem nightly at home, given ativan here    VTE prophylaxis: lovenox 30 mg daily  GI prophylaxis: pantoprazole 40mg PO once daily, will discharge on zegerid after discussing GERD with Dr. Rashida Kay: awaiting pulmonology clearance and home O2 evaluation; discharge with home health care and outpatient OT    Will discuss plan with attending Dr. Tammy Aguirre.     Karyn Bunch MD  8/9/2020  11:12 AM

## 2020-08-09 NOTE — PLAN OF CARE
Problem: Falls - Risk of:  Goal: Will remain free from falls  Description: Will remain free from falls  8/8/2020 2254 by Michael Song RN  Outcome: Ongoing  8/8/2020 1930 by Wilber Koyanagi, RN  Outcome: Met This Shift  Note: No falls this shift. Up to bathroom per self without difficulty. Used call light appropriately. Problem: Falls - Risk of:  Goal: Absence of physical injury  Description: Absence of physical injury  Outcome: Ongoing   Pt has had zero falls or injuries so far this shift.

## 2020-08-09 NOTE — PROGRESS NOTES
Pulmonary Critical Care Progress Note  Lilia Ramsay MD     Patient seen for the follow up of acute on chronic hypoxic respiratory failure, acute exacerbation of pulmonary fibrosis, silicosis, suspected obstructive sleep apnea, A. fib     Subjective:  Patient has productive cough with clear sputum. He denies any chest pain. His shortness of breath slightly better. He is tolerating orals. He had uneventful night. He used BiPAP last night and tolerated well. Examination:  Vitals: BP (!) 99/53   Pulse 97   Temp 97.9 °F (36.6 °C) (Oral)   Resp 21   Ht 6' 8\" (2.032 m)   Wt 260 lb 9.3 oz (118.2 kg)   SpO2 92%   BMI 28.63 kg/m²   General appearance:  alert and cooperative with exam  Neck: No JVD  Lungs: Bilateral crackles, moderate air exchange. Heart: regular rate and rhythm, S1, S2 normal, no gallop  Abdomen: Soft, non tender, + BS  Extremities: no cyanosis or clubbing. No significant edema    LABs:  CBC:   Recent Labs     08/09/20  0426   WBC 10.2   HGB 14.0   HCT 43.7        BMP:   Recent Labs     08/08/20  0436 08/09/20  0426    142   K 4.3 4.6   CO2 34* 37*   BUN 22 23   CREATININE 0.78 0.95   LABGLOM >60 >60   GLUCOSE 160* 175*     Radiology:  8/9/2020      Impression:  · Acute on chronic hypoxic respiratory failure  · Acute exacerbation of pulmonary fibrosis/silicosis  · Bilateral pulmonary filtrate, radiologically improving  · Atelectasis  · A.  Fib  · Suspected obstructive sleep apnea    Recommendations:  · Continue Augmentin  · IV solu-medrol, start taper  · Mucinex  · Xopenex and Ipratropium Q 4 hours and prn  · Symbicort  · IV Lasix  · Increase ambulation  · Incentive spirometry every hour while awake  · Labs: CBC and BMP in am  · BiPAP, while asleep and as needed  · Sleep apnea while he has an outpatient  · 4-6 liters/min via nasal cannula, patient will need reevaluation of her oxygen requirement at the time of discharge  · DVT prophylaxis with low molecular weight

## 2020-08-09 NOTE — PROGRESS NOTES
Date:   8/9/2020  Patient name: Velma Valdovinos  Date of admission:  8/3/2020 12:33 PM  MRN:   [id number]  YOB: 1955  PCP: Dara Henderson PA-C    Reason for Admission: Atrial fibrillation [facility]) [I48.91]    Cardiology follow-up: Atrial fibrillation/multifocal frequent PACs, atrial runs       Impression      Supraventricular arrhythmia  CHF systolic and diastolic  Moderate LVH  Pulmonary fibrosis, chronic hypoxia, oxygen dependent  History of silicosis  Myopathy  Hypothyroidism  Essential hypertension  History of depression     ECG 8/3/2020 sinus rhythm, multifocal PACs, PVCs, ventricular couplets, incomplete right bundle branch block  Chest x-ray 8/3/2020 :Cardiomegaly with extensive bilateral airspace disease representing any form of edema, ARDS or multifocal infection     2D echo 5/26/2018:  Normal LV size, moderate LVH ejection fraction 45 to 50%, dilated RV with mildly reduced function, no significant valvular abnormality, trivial pericardial effusion     Lab work on admission sodium 141, potassium 4.0, BUN 10, creatinine 0.82  proBNP 868, high-sensitivity troponin 15 and 15  Hemoglobin 4.6, WBC 10.0, platelets 056  TSH 8.73  Lab work 8/7/2020 sodium 142, potassium 4.2, BUN 15, creatinine 0.78, glucose 164    History of present illness  71-year-old male who has past medical history of pulmonary fibrosis, silicosis, pulmonary hypertension got admitted on 8/30/2020 with a diagnosis of new onset A. Fib.  He went to see his physician and a he was very short of breath and hypoxic and electrocardiogram showed A. fib with RVR.  He did not have chest pain or syncope.  His ECG in this hospital showed sinus rhythm, frequent PAC, occasional PVC.  No previous history of A. Fib.  No previous history of coronary intervention.  He has severely limited mobility secondary to his chronic pulmonary pathology.  He does get swelling over the ankles now and again and he takes diuretics as needed     Current evaluation 8/9/2020    Patient seen and examined medications and labs checked  He was resting well  No chest pain no palpitation  ECG monitor sinus rhythm, PAC and PVC      Medications:   Scheduled Meds:   methylPREDNISolone  30 mg Intravenous Q8H    levalbuterol  1.25 mg Nebulization Q4H    ipratropium  0.5 mg Nebulization Q4H    guaiFENesin  1,200 mg Oral BID    dilTIAZem  120 mg Oral Daily    amoxicillin-clavulanate  1 tablet Oral 2 times per day    lisinopril  20 mg Oral Daily    furosemide  20 mg Intravenous Daily    budesonide-formoterol  2 puff Inhalation BID    DULoxetine  30 mg Oral Daily    levothyroxine  25 mcg Oral Daily    pantoprazole  40 mg Oral QAM AC    sodium chloride flush  10 mL Intravenous 2 times per day    enoxaparin  30 mg Subcutaneous BID     Continuous Infusions:  CBC:   Recent Labs     08/09/20  0426   WBC 10.2   HGB 14.0        BMP:    Recent Labs     08/07/20  0451 08/08/20  0436 08/09/20  0426    142 142   K 4.2 4.3 4.6    102 100   CO2 35* 34* 37*   BUN 15 22 23   CREATININE 0.78 0.78 0.95   GLUCOSE 164* 160* 175*     Hepatic: No results for input(s): AST, ALT, ALB, BILITOT, ALKPHOS in the last 72 hours. Troponin: No results for input(s): TROPONINI in the last 72 hours. BNP: No results for input(s): BNP in the last 72 hours. Lipids: No results for input(s): CHOL, HDL in the last 72 hours. Invalid input(s): LDLCALCU  INR: No results for input(s): INR in the last 72 hours. Objective:   Vitals: /60   Pulse 70   Temp 97.7 °F (36.5 °C) (Oral)   Resp 24   Ht 6' 8\" (2.032 m)   Wt 260 lb 9.3 oz (118.2 kg)   SpO2 92%   BMI 28.63 kg/m²   General appearance: alert and cooperative with exam  HEENT: Head: Normal, normocephalic, atraumatic.   Neck: no adenopathy, no carotid bruit, no JVD, supple, symmetrical, trachea midline and thyroid not enlarged, symmetric, no tenderness/mass/nodules  Lungs: Poor chest expansion, bilateral wheezing and basal

## 2020-08-09 NOTE — PROGRESS NOTES
New orders placed per text response by Dr Tori Baldwin via Nicole Perez. Message sent be the writer: [phone] From: Schuyler Cerda [facility] RESPIRATORY RE: Carter Landrum I noticed in your notes from today that you wanted the pt switched to Q4 bronchodilator treatments as well as bipap added HS and with increased work of breathing, but there are no new orders from this. Would you like me to add those orders or hold off at this time?  Janae Kerns, RRT  Read 7:16 PM

## 2020-08-09 NOTE — PROGRESS NOTES
Pt provided scheduled breathing tx and placed on overnight bipap, per updated orders via [employer] from Dr Shaw Corona. Mask and alarm settings appropriate. Pt breathes in the 30s as a baseline. Pt tolerating bipap set to 9/6 with 45% O2 at this time.  Back up RR is 20 bpm.

## 2020-08-09 NOTE — PLAN OF CARE
Problem: Skin Integrity:  Goal: Will show no infection signs and symptoms  Description: Will show no infection signs and symptoms  Outcome: Met This Shift  Note: No s/s of infection noted. Afebrile. Wound to RLE healing. Problem: Falls - Risk of:  Goal: Will remain free from falls  Description: Will remain free from falls  Outcome: Met This Shift  Note: No falls this shift. Up to bathroom per self. Pt did walk in the room today. Gait steady. No c/o weakness.

## 2020-08-10 NOTE — PLAN OF CARE
Problem: Breathing Pattern - Ineffective:  Goal: Ability to achieve and maintain a regular respiratory rate will improve  Description: Ability to achieve and maintain a regular respiratory rate will improve  Outcome: Ongoing  Note: Respiratory assessment as charted. No signs or symptoms of distress. Problem: Falls - Risk of:  Goal: Will remain free from falls  Description: Will remain free from falls  Outcome: Ongoing  Note: No falls this shift. Call light within reach and siderails x2. Bed in lowest position. Patient safety maintained. Goal: Absence of physical injury  Description: Absence of physical injury  Outcome: Ongoing  Note: No falls this shift. Call light within reach and siderails x2. Bed in lowest position. Patient safety maintained. Problem: Skin Integrity:  Goal: Will show no infection signs and symptoms  Description: Will show no infection signs and symptoms  Outcome: Ongoing  Note: Patient remains free from infection and no signs and symptoms of infection. Goal: Absence of new skin breakdown  Description: Absence of new skin breakdown  Outcome: Ongoing  Note: Skin assessment as charted. No new areas of breakdown. Problem: Pain:  Goal: Pain level will decrease  Description: Pain level will decrease  Outcome: Ongoing  Note: Patient denies any pain. Will continue to monitor.    Goal: Control of acute pain  Description: Control of acute pain  Outcome: Ongoing  Goal: Control of chronic pain  Description: Control of chronic pain  Outcome: Ongoing     Problem: Nutrition  Goal: Optimal nutrition therapy  Outcome: Ongoing

## 2020-08-10 NOTE — PROGRESS NOTES
Pt placed on hospital bipap unit for the night and schedule tx provided. Pt tolerating settings of 12/6, Back up RR 20bpm, and 40% O2. Pt RR baseline is in the high 20s-low 30s. Mask and alarms appropriate.

## 2020-08-10 NOTE — PROGRESS NOTES
Comprehensive Nutrition Assessment    Type and Reason for Visit:  Initial(LOS day 7)    Nutrition Recommendations/Plan: Continue General diet as ordered. Nutrition Assessment:  Patient admission is related to Arterial Fibrillation. Currently on General diet, eating well, weight stable since admission. Continue current diet, monitor PO intakes, weight and labs. Malnutrition Assessment:  Malnutrition Status:  No malnutrition    Context:  Acute Illness     Findings of the 6 clinical characteristics of malnutrition:  Energy Intake:  No significant decrease in energy intake  Weight Loss:  No significant weight loss     Body Fat Loss:  Unable to assess     Muscle Mass Loss:  Unable to assess    Fluid Accumulation:  1 - Mild Extremities   Strength:  Not Performed    Estimated Daily Nutrient Needs:  Energy (kcal):  2576 kcal based on [place]- [place]. Jeor and 1.2 factor; Weight Used for Energy Requirements:  Current     Protein (g):  123-144 gm based on 1.2-1.4 gm/kg of ideal body weight; Weight Used for Protein Requirements:  Ideal        Fluid (ml/day):   ; Weight Used for Fluid Requirements:         Nutrition Related Findings:  Edema: trace RLE, LLE. Bowel sounds active. Labs: ProBNP 868, glucose 159      Wounds:  (Traumatic wound right lower leg (dog bite))       Current Nutrition Therapies:    DIET GENERAL; Anthropometric Measures:  · Height: 6' 8\" (203.2 cm)  · Current Body Weight: 262 lb 12.6 oz (119.2 kg)   · Admission Body Weight: 260 lb (117.9 kg)    · Usual Body Weight:       · Ideal Body Weight: 226 lbs; % Ideal Body Weight 116.3 %   · BMI: 28.9  · BMI Categories: Overweight (BMI 25.0-29. 9)       Nutrition Diagnosis:   · Overweight/Obese related to cardiac dysfunction as evidenced by lab values    Nutrition Interventions:   Food and/or Nutrient Delivery:  Continue Current Diet  Nutrition Education/Counseling:  Education not indicated   Coordination of Nutrition Care:  Continued Inpatient Monitoring    Goals:  PO intakes to meet greater than 75% of estimated nutrition needs       Nutrition Monitoring and Evaluation:   Food/Nutrient Intake Outcomes:  Food and Nutrient Intake  Physical Signs/Symptoms Outcomes:  Biochemical Data, Weight, Skin     Discharge Planning:    Continue current diet       Some areas of assessment may be incomplete due to COVID-19 precautions    Samia Gutierrez RD, LD  Office phone [phone]

## 2020-08-10 NOTE — PROGRESS NOTES
budesonide-formoterol  2 puff Inhalation BID    DULoxetine  30 mg Oral Daily    levothyroxine  25 mcg Oral Daily    pantoprazole  40 mg Oral QAM AC    sodium chloride flush  10 mL Intravenous 2 times per day    enoxaparin  30 mg Subcutaneous BID     Continuous Infusions:   PRN Meds: perflutren lipid microspheres, albuterol, sodium chloride, sodium chloride flush, acetaminophen **OR** acetaminophen, polyethylene glycol, promethazine **OR** ondansetron, potassium chloride **OR** potassium alternative oral replacement **OR** potassium chloride, LORazepam    Data:     Past Medical History:   has a past medical history of Depressive disorder, not elsewhere classified, Impotence of organic origin, Insomnia, unspecified, Myopathy, unspecified, Postinflammatory pulmonary fibrosis ([address] Unspecified essential hypertension, and Unspecified hypothyroidism. Social History:   reports that he quit smoking about 15 years ago. He has a 3.75 pack-year smoking history. He has never used smokeless tobacco. He reports current alcohol use. He reports that he does not use drugs. Family History:   Family History   Problem Relation Age of Onset    High Blood Pressure Mother     Cancer Mother     Stroke Mother     High Blood Pressure Father     Cancer Sister     Heart Disease Other         Family in general       Vitals:  /76   Pulse 66   Temp 98.1 °F (36.7 °C) (Axillary)   Resp 30   Ht 6' 8\" (2.032 m)   Wt 262 lb 12.6 oz (119.2 kg)   SpO2 95%   BMI 28.87 kg/m²   Temp (24hrs), Av.8 °F (36.6 °C), Min:97.5 °F (36.4 °C), Max:98.1 °F (36.7 °C)    No results for input(s): POCGLU in the last 72 hours. I/O(24Hr):     Intake/Output Summary (Last 24 hours) at 8/10/2020 0701  Last data filed at 8/10/2020 4889  Gross per 24 hour   Intake 1430 ml   Output 1750 ml   Net -320 ml       Labs:    CBC with Differential:    Lab Results   Component Value Date    WBC 10.2 2020    RBC 4.77 2020    RBC 4.54 12/28/2011    HGB 14.0 08/09/2020    HCT 43.7 08/09/2020     08/09/2020     12/28/2011    MCV 91.6 08/09/2020    MCH 29.3 08/09/2020    MCHC 32.0 08/09/2020    RDW 15.2 08/09/2020    LYMPHOPCT 3 08/09/2020    MONOPCT 7 08/09/2020    BASOPCT 0 08/09/2020    MONOSABS 0.71 08/09/2020    LYMPHSABS 0.31 08/09/2020    EOSABS 0.00 08/09/2020    BASOSABS 0.00 08/09/2020    DIFFTYPE NOT REPORTED 08/09/2020     BMP:    Lab Results   Component Value Date     08/10/2020    K 4.5 08/10/2020    CL 99 08/10/2020    CO2 37 08/10/2020    BUN 21 08/10/2020    LABALBU 3.1 08/03/2020    CREATININE 0.76 08/10/2020    CALCIUM 8.4 08/10/2020    GFRAA >60 08/10/2020    LABGLOM >60 08/10/2020    GLUCOSE 159 08/10/2020    GLUCOSE 157 12/28/2011       Lab Results   Component Value Date/Time    SPECIAL  12/26/2011 07:42 PM     RIGHT ANTECUBITAL Performed at [facility]     Lab Results   Component Value Date/Time    CULTURE NO GROWTH 6 DAYS 12/26/2011 07:42 PM    CULTURE  Performed at [facility] 12/26/2011 07:42 PM         Radiology:    Xr Chest Portable    Result Date: 8/4/2020  EXAMINATION: ONE X-RAY VIEW OF THE CHEST 8/3/2020 1:14 pm COMPARISON: December 26, 2011 HISTORY: ORDERING SYSTEM PROVIDED HISTORY: SOB TECHNOLOGIST PROVIDED HISTORY: SOB Reason for Exam: Patient states SOB 2 days Acuity: Acute Type of Exam: Initial FINDINGS: Extensive multifocal bilateral interstitial and airspace disease confluent throughout the mid and lower lungs. The heart is enlarged. The aorta is not well defined. Cardiomegaly with extensive bilateral airspace disease representing any form of edema, ARDS, or multifocal infection. Poorly defined aortic arch. While this likely reflects technique, consideration to follow-up CT imaging. Physical Examination:        Physical Exam  Constitutional:       General: He is not in acute distress. HENT:      Head: Normocephalic and atraumatic.       Mouth/Throat: showing atrial fibrillation; repeat EKG in ED showed sinus rhythm with PVCs and PACs; patient had EKG recordings on his phone that also showed PACs but no atrial fibrillation that he recorded for the month of July; cardiology reviewed outpatient ECG and determined it is not a-fib; no need for anticoagulation  - Echo done on 8/4 showed LVEF 50%, mild to moderate LVH, D-sign indicating RV pressure/volume overload; dilated right ventricle with reduced systolic function    Acute on chronic respiratory failure with hypoxemia secondary to pulmonary fibrosis and silicosis  - Diagnosed with silicosis in 4042, lung biopsy in 7/2019  - On home oxygen 4L, continue oxygen therapy here; currently tolerating 4-6 L on nasal cannula, pending home O2 eval today  - On ipratropium and xopenex q4h and budesonide-formeterol inhaler 2 puff q12h  - Pulmonology on board, increased home dose of prednisone to 20 mg; started to wean IV steroids, currently at 30 mg q8h now; awaiting pulmonology clearance for discharge  - BiPaP added for overnight  - 8/8 CXR showed increased opacification of right upper lung that could be superimposed pneumonia     Systolic and diastolic CHF  - Echo done on 8/4 showed LVEF 50%, mild to moderate LVH, D-shaped septum indicating RV pressure/volume overload; dilated right ventricle with reduced systolic function  - BNP on admission was 868  - Cardiology on board, on lisinopril and lasix already, cardiology decreased lisinopril to 20 mg and changed amlodipine to diltiazem    Right lower extremity wound  - From dog scratch, s/p closure with stitches on 7/30  - Continue Augmentin, ends 7/10    Hypothyroidism  - Continue 25 mg levothyroxine  - TSH 1.44 (normal) in ED    HTN  - Continue home medications: was on amlodipine, benazapril, and lasix  - Cardiology discontinued amlodipine and started diltiazem instead and decreased dose of lisinopril to 20 mg, continue with home lasix dose    Major depression  - Continue cymbalta  - No suicidal ideation    Insomnia  - Zolpidem nightly at home, given ativan here    VTE prophylaxis: lovenox 30 mg daily  GI prophylaxis: pantoprazole 40mg PO once daily, will discharge on zegerid after discussing GERD with Dr. Arana Spine: awaiting pulmonology clearance and home O2 evaluation; discharge with home health care and outpatient OT    Will discuss plan with attending Dr. Pati Wood.     Teresa Richards MD  8/10/2020  7:01 AM

## 2020-08-10 NOTE — PROGRESS NOTES
Physical Therapy  DATE: 8/10/2020    NAME: Yumiko Rondon  MRN: [id number]   : 1955    Patient not seen this date for Physical Therapy due to:  [] Blood transfusion in progress  [] Cancel by RN  [] Hemodialysis  [x]  Refusal by Patient: \"You've seen me move, you know I can do it. I really want to focus on my breathing today. \" Will continue to follow.    [] Spine Precautions   [] Strict Bedrest  [] Surgery  [] Testing      [] Other        [] PT being discontinued at this time. Patient independent. No further needs. [] PT being discontinued at this time as the patient has been transferred to hospice care. No further needs.     Miguelina Obregon, PTA

## 2020-08-10 NOTE — PROGRESS NOTES
PULMONARY PROGRESS NOTE:      Interval History:Hypoxia, Silicosis    Shortness of Breath: yes   Cough: yes  Sputum: yes  Hemoptysis: no  Chest Pain: no  Fever: no                   Swelling Feet: yes   Headache: no                                           Nausea, Emesis, Abdominal Pain: no  Diarrhea: no         Constipation: no    Events since last visit: none    PAST MEDICAL HISTORY:      Scheduled Meds:   predniSONE  40 mg Oral Daily    levalbuterol  1.25 mg Nebulization Q4H    ipratropium  0.5 mg Nebulization Q4H    guaiFENesin  1,200 mg Oral BID    dilTIAZem  120 mg Oral Daily    amoxicillin-clavulanate  1 tablet Oral 2 times per day    lisinopril  20 mg Oral Daily    furosemide  20 mg Intravenous Daily    budesonide-formoterol  2 puff Inhalation BID    DULoxetine  30 mg Oral Daily    levothyroxine  25 mcg Oral Daily    pantoprazole  40 mg Oral QAM AC    sodium chloride flush  10 mL Intravenous 2 times per day    enoxaparin  30 mg Subcutaneous BID     Continuous Infusions:  PRN Meds:perflutren lipid microspheres, albuterol, sodium chloride, sodium chloride flush, acetaminophen **OR** acetaminophen, polyethylene glycol, promethazine **OR** ondansetron, potassium chloride **OR** potassium alternative oral replacement **OR** potassium chloride, LORazepam        PHYSICAL EXAMINATION:  /74   Pulse 77   Temp 98.2 °F (36.8 °C) (Oral)   Resp 24   Ht 6' 8\" (2.032 m)   Wt 262 lb 12.6 oz (119.2 kg)   SpO2 93%   BMI 28.87 kg/m²   afebrile  General : Awake, alert, oriented to time, place, and person  Neck - supple, no lymphadenopathy, JVD not raised  Heart - regular rhythm, S1 and S2 normal; no additional sounds heard  Lungs - Air Entry- fair bilaterally; breath sounds : expiratory wheezes, 92% on 5L  Abdomen - soft, no tenderness  Upper Extremities  - no cyanosis, mottling; edema : absent  Lower Extremities: no cyanosis, mottling; edema : none    Current Laboratory, Radiologic, Microbiologic, and Diagnostic studies reviewed  Data ReviewCBC:   Recent Labs     08/09/20  0426   WBC 10.2   RBC 4.77   HGB 14.0   HCT 43.7        BMP:   Recent Labs     08/08/20  0436 08/09/20  0426 08/10/20  0447   GLUCOSE 160* 175* 159*    142 141   K 4.3 4.6 4.5   BUN 22 23 21   CREATININE 0.78 0.95 0.76   CALCIUM 8.7 8.7 8.4*     ABGs:   Recent Labs     08/10/20  1154   PHART 7.425   PO2ART 71.7*   [id number] 60.1*   [id number] 39.4*   [id number] 93.8*      PT/INR:  No results found for: PTINR    ASSESSMENT / PLAN:  A fib- cardiology consult   Acute on chronic hypoxic respiratory failure- wean O2 to maintain sats >90%  Pulmonary Fibrosis/Silicosis - BD,   May need higher dose O2 concentrator at home   Steroids to PO  Add BD, Acapella, mucinex  RLE wound- Abx  HTN  Check random ABG - if pCO2 elevated, consider NIV    This is a late note on patient seen by me earlier today.   Electronically signed by Raeann Roland on 08/10/20 at 7:10 PM

## 2020-08-10 NOTE — CARE COORDINATION
ONGOING DISCHARGE PLAN:    Spoke with patient regarding discharge plan and patient confirms that plan is still to return to home w/ Wife, W/ VNS, [address] Writer following for High Level Oxygen Concentrator, for his at home only goes up to 5L. Will need new Home O2 Eval.    Last O2 Sat by Resp, was 93% on 5LNC. Writer notified, Kortney Puckett, from [facility] to follow for this as well as his new Nebulizer. Pt. Remains on PO Prednisone. IV Lasix, 20mg, Bid. Pulmonary continues to follow. ?DC today. Will continue to follow for additional discharge needs.     Electronically signed by Verenice Bush RN on 8/10/2020 at 12:05 PM

## 2020-08-10 NOTE — PROGRESS NOTES
PULMONARY PROGRESS NOTE:      Interval History:Hypoxia, Silicosis    Shortness of Breath: yes   Cough: yes  Sputum: yes  Hemoptysis: no  Chest Pain: no  Fever: no                   Swelling Feet: yes   Headache: no                                           Nausea, Emesis, Abdominal Pain: no  Diarrhea: no         Constipation: no    Events since last visit: none    PAST MEDICAL HISTORY:      Scheduled Meds:   methylPREDNISolone  30 mg Intravenous Q8H    levalbuterol  1.25 mg Nebulization Q4H    ipratropium  0.5 mg Nebulization Q4H    guaiFENesin  1,200 mg Oral BID    dilTIAZem  120 mg Oral Daily    amoxicillin-clavulanate  1 tablet Oral 2 times per day    lisinopril  20 mg Oral Daily    furosemide  20 mg Intravenous Daily    budesonide-formoterol  2 puff Inhalation BID    DULoxetine  30 mg Oral Daily    levothyroxine  25 mcg Oral Daily    pantoprazole  40 mg Oral QAM AC    sodium chloride flush  10 mL Intravenous 2 times per day    enoxaparin  30 mg Subcutaneous BID     Continuous Infusions:  PRN Meds:perflutren lipid microspheres, albuterol, sodium chloride, sodium chloride flush, acetaminophen **OR** acetaminophen, polyethylene glycol, promethazine **OR** ondansetron, potassium chloride **OR** potassium alternative oral replacement **OR** potassium chloride, LORazepam        PHYSICAL EXAMINATION:  BP (!) 108/59   Pulse 94   Temp 97.5 °F (36.4 °C) (Oral)   Resp 20   Ht 6' 8\" (2.032 m)   Wt 262 lb 12.6 oz (119.2 kg)   SpO2 94%   BMI 28.87 kg/m²   afebrile  General : Awake, alert, oriented to time, place, and person  Neck - supple, no lymphadenopathy, JVD not raised  Heart - regular rhythm, S1 and S2 normal; no additional sounds heard  Lungs - Air Entry- fair bilaterally; breath sounds : expiratory wheezes, 92% on 5L  Abdomen - soft, no tenderness  Upper Extremities  - no cyanosis, mottling; edema : absent  Lower Extremities: no cyanosis, mottling; edema : none    Current Laboratory, Radiologic, Microbiologic, and Diagnostic studies reviewed  Data ReviewCBC:   Recent Labs     08/09/20  0426   WBC 10.2   RBC 4.77   HGB 14.0   HCT 43.7        BMP:   Recent Labs     08/08/20  0436 08/09/20  0426 08/10/20  0447   GLUCOSE 160* 175* 159*    142 141   K 4.3 4.6 4.5   BUN 22 23 21   CREATININE 0.78 0.95 0.76   CALCIUM 8.7 8.7 8.4*     ABGs: No results for input(s): PHART, PO2ART, IVC2QED, XXU2WOD, BEART, G8PVXBJN, JAR4EIC in the last 72 hours.    PT/INR:  No results found for: PTINR    ASSESSMENT / PLAN:  A fib- cardiology consult   Acute on chronic hypoxic respiratory failure- wean O2 to maintain sats >90%  Pulmonary Fibrosis/Silicosis - BD,   May need higher dose O2 concentrator at home   Steroids to PO  Add BD, Acapella, mucinex  RLE wound- Abx  HTN  Check random ABG    Plan of care discussed with Dr Ray Daniel  Electronically signed by Annel Gonzales on 08/10/20 at 11:17 AM.

## 2020-08-10 NOTE — PROGRESS NOTES
Writer talked to Dr. Hodan Ward regarding discharge and ABG's. Dr Hodan Ward stated to tell  about patient needing trilogy and C9. Patient is on workers comp. [address]  was informed of information.

## 2020-08-10 NOTE — PROGRESS NOTES
[address] [address]   OCCUPATIONAL THERAPY MISSED TREATMENT NOTE   INPATIENT   Date: 8/10/20  Patient Name: Edward Carpio       Room:   MRN: [id number]   Account #: [de-identified]    : 1955  (63 y.o.)  Gender: male   Referring Practitioner: Joanne Hinkle MD  Diagnosis: A-Fib             REASON FOR MISSED TREATMENT:  Patient refusal   -    Refusal by Patient Pt refusing stateing he wasn't awake yet and \" I want to focus on breathing today, I don't want to mess that up\" as pt says he is learning to nose breath and wants that to be his only focus today.  OT will check back at next avaiable date     ALICIA Retana

## 2020-08-10 NOTE — PROGRESS NOTES
Date:   8/10/2020  Patient name: Author Freeman  Date of admission:  8/3/2020 12:33 PM  MRN:   [id number]  YOB: 1955  PCP: Opal Goins PA-C    Reason for Admission: Atrial fibrillation [facility]) [I48.91]    Cardiology follow-up: Atrial fibrillation/multifocal frequent PACs, atrial runs       Impression      Supraventricular arrhythmia  CHF systolic and diastolic  Moderate LVH  Pulmonary fibrosis, chronic hypoxia, oxygen dependent  History of silicosis  Myopathy  Hypothyroidism  Essential hypertension  History of depression     ECG 8/3/2020 sinus rhythm, multifocal PACs, PVCs, ventricular couplets, incomplete right bundle branch block  Chest x-ray 8/3/2020 :Cardiomegaly with extensive bilateral airspace disease representing any form of edema, ARDS or multifocal infection     2D echo 5/26/2018:  Normal LV size, moderate LVH ejection fraction 45 to 50%, dilated RV with mildly reduced function, no significant valvular abnormality, trivial pericardial effusion     Lab work on admission sodium 141, potassium 4.0, BUN 10, creatinine 0.82  proBNP 868, high-sensitivity troponin 15 and 15  Hemoglobin 4.6, WBC 10.0, platelets 371  TSH 6.66  Lab work 8/7/2020 sodium 142, potassium 4.2, BUN 15, creatinine 0.78, glucose 164    History of present illness  59-year-old male who has past medical history of pulmonary fibrosis, silicosis, pulmonary hypertension got admitted on 8/30/2020 with a diagnosis of new onset A. Fib.  He went to see his physician and a he was very short of breath and hypoxic and electrocardiogram showed A. fib with RVR.  He did not have chest pain or syncope.  His ECG in this hospital showed sinus rhythm, frequent PAC, occasional PVC.  No previous history of A. Fib.  No previous history of coronary intervention.  He has severely limited mobility secondary to his chronic pulmonary pathology.  He does get swelling over the ankles now and again and he takes diuretics as needed     Current evaluation 8/10/2020     Patient seen and examined medications and labs checked  He was resting well  No chest pain no palpitation  No fever no chills  No productive cough  ECG monitor sinus rhythm, PAC and PVC    Medications:   Scheduled Meds:   predniSONE  40 mg Oral Daily    levalbuterol  1.25 mg Nebulization Q4H    ipratropium  0.5 mg Nebulization Q4H    guaiFENesin  1,200 mg Oral BID    dilTIAZem  120 mg Oral Daily    amoxicillin-clavulanate  1 tablet Oral 2 times per day    lisinopril  20 mg Oral Daily    furosemide  20 mg Intravenous Daily    budesonide-formoterol  2 puff Inhalation BID    DULoxetine  30 mg Oral Daily    levothyroxine  25 mcg Oral Daily    pantoprazole  40 mg Oral QAM AC    sodium chloride flush  10 mL Intravenous 2 times per day    enoxaparin  30 mg Subcutaneous BID     Continuous Infusions:  CBC:   Recent Labs     08/09/20  0426   WBC 10.2   HGB 14.0        BMP:    Recent Labs     08/08/20  0436 08/09/20  0426 08/10/20  0447    142 141   K 4.3 4.6 4.5    100 99   CO2 34* 37* 37*   BUN 22 23 21   CREATININE 0.78 0.95 0.76   GLUCOSE 160* 175* 159*     Hepatic: No results for input(s): AST, ALT, ALB, BILITOT, ALKPHOS in the last 72 hours. Troponin: No results for input(s): TROPONINI in the last 72 hours. BNP: No results for input(s): BNP in the last 72 hours. Lipids: No results for input(s): CHOL, HDL in the last 72 hours. Invalid input(s): LDLCALCU  INR: No results for input(s): INR in the last 72 hours. Objective:   Vitals: /74   Pulse 77   Temp 98.2 °F (36.8 °C) (Oral)   Resp 18   Ht 6' 8\" (2.032 m)   Wt 262 lb 12.6 oz (119.2 kg)   SpO2 94%   BMI 28.87 kg/m²   General appearance: alert and cooperative with exam  HEENT: Head: Normal, normocephalic, atraumatic.   Neck: no adenopathy, no JVD, supple, symmetrical, trachea midline and thyroid not enlarged, symmetric, no tenderness/mass/nodules  Lungs: Poor chest expansion, bilateral basal crackles, wheezing  Heart: Heart sounds are distant  Abdomen: Obese abdominal hernia noted bowel sounds present  Extremities: Homans sign is negative, no sign of DVT  Neurologic: Mental status: Alert, oriented, thought content appropriate    EKG: Sinus rhythm PAC PVC. ECHO: reviewed. Ejection fraction: 55%, dilated RV, D-shaped septum, RVSP 37  Stress Test: not obtained. Cardiac Angiography: not obtained.         

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
## 2020-08-11 NOTE — CARE COORDINATION
Continuity of Care Form    Patient Name: Geri Lance   :  1955  MRN:  [id number]    Admit date:  8/3/2020  Discharge date:  2020    Code Status Order: Full Code   Advance Directives:   [facility] Documentation     Date/Time Healthcare Directive Type of Healthcare Directive Copy in [facility] [address] Agent's Name Healthcare Agent's Phone Number    20 1851  No, patient does not have an advance directive for healthcare treatment -- -- -- -- --          Admitting Physician:  Sara Thompson MD  PCP: Renee Porter PA-C    Discharging Nurse: [facility] Unit/Room#: 2114/2114-01  Discharging Unit Phone Number: [phone]    Emergency Contact:   Extended Emergency Contact Information  Primary Emergency Contact: Samra Arreaga  Address: [address] Phone: [phone]  Mobile Phone: [phone]  Relation: Spouse    Past Surgical History:  Past Surgical History:   Procedure Laterality Date    LUNG BIOPSY Left        Immunization History:   Immunization History   Administered Date(s) Administered    Influenza Vaccine, unspecified formulation 10/05/2016    Influenza Virus Vaccine 2009, 2011, 2014, 2015    Influenza Whole 2015    Influenza, Quadv, IM, PF (6 mo and older Fluzone, Flulaval, Fluarix, and 3 yrs and older Afluria) 10/05/2016, 2017    Pneumococcal Conjugate 13-valent (Kimmie Schanz) 2014    Tdap (Boostrix, Adacel) 10/05/2016, 10/23/2019       Active Problems:  Patient Active Problem List   Diagnosis Code    Myopathy G72.9    Essential hypertension I10    Depressive disorder, not elsewhere classified F32.9    Impotence of organic origin N52.9    Hypothyroidism E03.9    Insomnia G47.00    Pulmonary fibrosis ([address] J84.10    Bronchiectasis ([address] J47.9    Hernia, umbilical X29.0    Current chronic use of systemic steroids Z79.52    Pneumoconiosis Catheter: None   Colostomy/Ileostomy/Ileal Conduit: No       Date of Last BM: 8/8    Intake/Output Summary (Last 24 hours) at 8/5/2020 1628  Last data filed at 8/5/2020 0549  Gross per 24 hour   Intake 650 ml   Output 1100 ml   Net -450 ml     I/O last 3 completed shifts: In: 650 [P.O.:650]  Out: 2600 [Urine:2600]    Safety Concerns: At Risk for Falls    Impairments/Disabilities:      Vision        Nutrition Therapy:  Current Nutrition Therapy:   - Oral Diet:  General    Routes of Feeding: Oral  Liquids: Thin Liquids  Daily Fluid Restriction: no  Last Modified Barium Swallow with Video (Video Swallowing Test): not done    Treatments at the Time of Hospital Discharge:   Respiratory Treatments: See Mar  Oxygen Therapy:  is on oxygen at 5 L/min per nasal cannula. Ventilator:    - BiPAP   IPAP: 12 cmH20, CPAP/EPAP: 6 cmH2O only when sleeping    Rehab Therapies: Physical Therapy and Occupational Therapy  Weight Bearing Status/Restrictions: No weight bearing restirctions  Other Medical Equipment (for information only, NOT a DME order):  wheelchair and cane  Other Treatments: Skilled Nursing Assessment Per Protocol. PT NEEDS A LOT OF MEDICATION EDUCATION/MONITORING/FOLLOWING. IS GETTING CONFUSED ABOUT MEDS, INHALERS, RESP TX. PLEASE FOLLOW. Right Lower Leg Wound dressing change daily. .. Cleanse with soap and water, dry, cover with nonadherent dressing or vaseline gauze, wrap with kerlex.     Patient's personal belongings (please select all that are sent with patient):  Glasses    RN SIGNATURE:  Electronically signed by Adriana Solis RN on 8/11/20 at 11:06 AM EDT    CASE MANAGEMENT/SOCIAL WORK SECTION    Inpatient Status Date:     Readmission Risk Assessment Score:  Readmission Risk              Risk of Unplanned Readmission:        10           Discharging to Facility/ [address]  Phone: [phone]  · Fax [phone]    Dialysis Facility (if applicable)   · Name:  · Address:  · Dialysis Schedule:  · Phone:  · Fax:    / signature: Electronically signed by Katerin Key RN on 8/5/20 at 4:25 PM EDT    PHYSICIAN SECTION    Prognosis: Good    Condition at Discharge: Stable    Rehab Potential (if transferring to Rehab): Good    Recommended Labs or Other Treatments After Discharge: Right Lower Leg Wound dressing change daily. .. Cleanse with soap and water, dry, cover with nonadherent dressing or vaseline gauze, wrap with kerlex. Physician Certification: I certify the above information and transfer of Karson Lilly  is necessary for the continuing treatment of the diagnosis listed and that he requires [address] for less 30 days. Update Admission H&P: No change in H&P    PHYSICIAN SIGNATURE: V/O Dr. Ani Hood/LINDA Holman RN, Electronically signed by Mehul Mendez MD on 8/7/2020 at 12:26 PM  Electronically signed by Sebastian Cagle MD on 8/11/2020 at 1:40 PM      Current Discharge Medication List     START taking these medications     Medication  Dose    Omeprazole-Sodium Bicarbonate  MG PACK  20 mg    Take 20 mg by mouth 2 times daily    Quantity: 30 each  Refills: 3        lisinopril (PRINIVIL;ZESTRIL) 20 MG tablet  20 mg    Take 1 tablet by mouth daily    Quantity: 30 tablet  Refills: 3        amoxicillin-clavulanate (AUGMENTIN) 875-125 MG per tablet  1 tablet    Take 1 tablet by mouth every 12 hours for 5 days    Quantity: 10 tablet  Refills: 0            CONTINUE these medications which have CHANGED or have new prescriptions     Medication  Dose    predniSONE (DELTASONE) 20 MG tablet  20 mg    Take 1 tablet by mouth daily for 10 days    Quantity: 10 tablet  Refills: 0            CONTINUE these medications which have NOT CHANGED     Medication  Dose    furosemide (LASIX) 20 MG tablet  20 mg    Take 20 mg by mouth daily as needed        zolpidem (AMBIEN CR) 12.5 MG extended release tablet  12.5 mg    Take 12.5 mg by mouth nightly as needed for Sleep. ibuprofen (ADVIL;MOTRIN) 800 MG tablet  800 mg    Take 1 tablet by mouth 2 times daily    Quantity: 60 tablet  Refills: 2        Comments: Please consider 90 day supplies to promote better adherence       levothyroxine (SYNTHROID) 25 MCG tablet     TAKE 1 TABLET BY MOUTH ONCE DAILY    Quantity: 90 tablet  Refills: 3        albuterol (PROVENTIL) (2.5 MG/3ML) 0.083% nebulizer solution  2.5 mg    Take 2.5 mg by nebulization every 6 hours as needed    Refills: 5         PROAIR  (90 BASE) MCG/ACT inhaler  2 puffs    Inhale 2 puffs into the lungs every 6 hours as needed for Wheezing        sodium chloride (ALTAMIST SPRAY) 0.65 % nasal spray  1 spray    1 spray by Nasal route as needed for Congestion.     Quantity: 1 Bottle  Refills: 3        DULoxetine (CYMBALTA) 30 MG capsule  30 mg    Take 30 mg by mouth daily    Refills: 0             STOP taking these previous medications     Medication  Dose  Reason for Stopping  Comments    (STOP TAKING) amLODIPine (NORVASC) 5 MG tablet             (STOP TAKING) amoxicillin-clavulanate (AUGMENTIN) 500-125 MG per tablet  1 tablet            (STOP TAKING) pantoprazole (PROTONIX) 40 MG tablet             (STOP TAKING) sucralfate (CARAFATE) 1 GM tablet             (STOP TAKING) benazepril (LOTENSIN) 40 MG tablet             (STOP TAKING) budesonide-formoterol (SYMBICORT) 160-4.5 MCG/ACT AERO  2 puffs            (STOP TAKING) Calcium Carbonate-Vitamin D (OYSTER SHELL CALCIUM/D) 500-200 MG-UNIT TABS  1 tablet            (STOP TAKING) sildenafil (VIAGRA) 100 MG tablet  100 mg            Printing Report     Report ID  Report Name  Print    [id number]  Discharge Meds  Print

## 2020-08-11 NOTE — CARE COORDINATION
Writer faxed updated notes for Pt's [facility] to [facility] [place]. Notified, Chandrakant Lazo, & she is working on getting a portable oxygen tank to the room.

## 2020-08-11 NOTE — PROGRESS NOTES
Home Oxygen Evaluation    Home Oxygen Evaluation completed. Patient is on 4 liters per minute via NC. Resting SpO2 = 88%  Resting SpO2 on room air = 83%    SpO2 on room air with exercise = NA  SpO2 on oxygen as above with exercise = 82% on 6L    Patient walked approximately 20 feet in his room on 6L. SpO2 down to 82%. Patient sat in the chair and recovered to 91% after 3 minutes resting on 6L.          Tabby Thurman  10:13 AM

## 2020-08-11 NOTE — PROGRESS NOTES
8/11/2020   Patient seen and examined  Patient denied any chest pain or palpitation  Able to walk few steps on oxygen  No fever no chills no productive cough  No paroxysmal nocturnal dyspnea        Medications:   Scheduled Meds:   predniSONE  40 mg Oral Daily    levalbuterol  1.25 mg Nebulization Q4H    ipratropium  0.5 mg Nebulization Q4H    guaiFENesin  1,200 mg Oral BID    dilTIAZem  120 mg Oral Daily    lisinopril  20 mg Oral Daily    furosemide  20 mg Intravenous Daily    budesonide-formoterol  2 puff Inhalation BID    DULoxetine  30 mg Oral Daily    levothyroxine  25 mcg Oral Daily    pantoprazole  40 mg Oral QAM AC    sodium chloride flush  10 mL Intravenous 2 times per day    enoxaparin  30 mg Subcutaneous BID     Continuous Infusions:  CBC:   Recent Labs     08/09/20  0426   WBC 10.2   HGB 14.0        BMP:    Recent Labs     08/09/20  0426 08/10/20  0447 08/11/20  0514    141 140   K 4.6 4.5 4.5    99 98   CO2 37* 37* 37*   BUN 23 21 26*   CREATININE 0.95 0.76 0.77   GLUCOSE 175* 159* 119*     Hepatic: No results for input(s): AST, ALT, ALB, BILITOT, ALKPHOS in the last 72 hours. Troponin: No results for input(s): TROPONINI in the last 72 hours. BNP: No results for input(s): BNP in the last 72 hours. Lipids: No results for input(s): CHOL, HDL in the last 72 hours. Invalid input(s): LDLCALCU  INR: No results for input(s): INR in the last 72 hours. Objective:   Vitals: BP (!) 110/46   Pulse 75   Temp 97.6 °F (36.4 °C) (Oral)   Resp 20   Ht 6' 8\" (2.032 m)   Wt 262 lb 12.6 oz (119.2 kg)   SpO2 98%   BMI 28.87 kg/m²   General appearance: alert and cooperative with exam  HEENT: Head: Normal, normocephalic, atraumatic. Neck: no adenopathy, no JVD, supple, symmetrical, trachea midline and thyroid not enlarged, symmetric, no tenderness/mass/nodules  Lungs:  For chest expansion, diminished breath sounds, basal crackles  Heart: Cardiac apical impulse not palpable heart sounds distant  Abdomen: Obese bowel sounds present  Extremities: Homans sign is negative, no sign of DVT  Neurologic: Mental status: Alert, oriented, thought content appropriate    EKG: normal sinus rhythm, PAC's noted, occasional PVC noted, unifocal, unchanged from previous tracings. ECHO: reviewed. Ejection fraction: 55%, dilated right ventricle, D-shaped septum, RVSP 37  Stress Test: not obtained. Cardiac Angiography: not obtained.         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

## 2020-08-11 NOTE — PLAN OF CARE
Problem: Falls - Risk of:  Goal: Will remain free from falls  Description: Will remain free from falls  8/10/2020 2339 by Bishnu Tariq RN  Outcome: Ongoing  8/10/2020 1613 by Roque Che RN  Outcome: Ongoing  Note: No falls this shift. Call light within reach and siderails x2. Bed in lowest position. Patient safety maintained. Problem: Falls - Risk of:  Goal: Absence of physical injury  Description: Absence of physical injury  8/10/2020 2339 by Bishnu Tariq RN  Outcome: Ongoing  8/10/2020 1613 by Roque Che RN  Outcome: Ongoing  Note: No falls this shift. Call light within reach and siderails x2. Bed in lowest position. Patient safety maintained. Pt has had zero falls or injuries so far this shift.

## 2020-08-11 NOTE — PROGRESS NOTES
RN spoke with Dr. Ray Daniel, he stated that patient can discharge to home on PO Prednisone 40mg until patient sees Dr. Ray Daniel in the office in 1 month.  Electronically signed by Shara Mosley RN on 8/11/2020 at 3:14 PM

## 2020-08-11 NOTE — PROGRESS NOTES
PULMONARY PROGRESS NOTE:      Interval History:Hypoxia, Silicosis    Shortness of Breath: yes, worse with exertion  Cough: yes   Sputum: yes           Hemoptysis: no  Chest Pain: no  Fever: no                   Swelling Feet: yes   Headache: no                                           Nausea, Emesis, Abdominal Pain: no  Diarrhea: no         Constipation: no    Events since last visit: Prolonged recovery time for O2 sats after activity.      PAST MEDICAL HISTORY:      Scheduled Meds:   furosemide  40 mg Oral Daily    predniSONE  40 mg Oral Daily    levalbuterol  1.25 mg Nebulization Q4H    ipratropium  0.5 mg Nebulization Q4H    guaiFENesin  1,200 mg Oral BID    dilTIAZem  120 mg Oral Daily    lisinopril  20 mg Oral Daily    budesonide-formoterol  2 puff Inhalation BID    DULoxetine  30 mg Oral Daily    levothyroxine  25 mcg Oral Daily    pantoprazole  40 mg Oral QAM AC    sodium chloride flush  10 mL Intravenous 2 times per day    enoxaparin  30 mg Subcutaneous BID     Continuous Infusions:  PRN Meds:perflutren lipid microspheres, albuterol, sodium chloride, sodium chloride flush, acetaminophen **OR** acetaminophen, polyethylene glycol, promethazine **OR** ondansetron, potassium chloride **OR** potassium alternative oral replacement **OR** potassium chloride, LORazepam        PHYSICAL EXAMINATION:  BP (!) 106/57   Pulse 84   Temp 97.5 °F (36.4 °C) (Oral)   Resp 20   Ht 6' 8\" (2.032 m)   Wt 262 lb 12.6 oz (119.2 kg)   SpO2 92%   BMI 28.87 kg/m²     General : Awake, alert, oriented to time, place, and person  Neck - supple, no lymphadenopathy, JVD not raised  Heart - regular rhythm, S1 and S2 normal; no additional sounds heard  Lungs - Air Entry- fair bilaterally; breath sounds : expiratory wheezes, 92% on 5L  Abdomen - soft, no tenderness  Upper Extremities  - no cyanosis, mottling; edema : absent  Lower Extremities: no cyanosis, mottling; edema : absent    Current Laboratory, Radiologic, Microbiologic, and Diagnostic studies reviewed  Data ReviewCBC:   Recent Labs     08/09/20  0426   WBC 10.2   RBC 4.77   HGB 14.0   HCT 43.7        BMP:   Recent Labs     08/09/20  0426 08/10/20  0447 08/11/20  0514   GLUCOSE 175* 159* 119*    141 140   K 4.6 4.5 4.5   BUN 23 21 26*   CREATININE 0.95 0.76 0.77   CALCIUM 8.7 8.4* 8.5*     ABGs:   Recent Labs     08/10/20  1154   PHART 7.425   PO2ART 71.7*   LRV5HQX 60.1*   TGF3ECH 39.4*   A6LKKWWE 93.8*      PT/INR:  No results found for: PTINR    ASSESSMENT / PLAN:  · A fib- cardiology consult   · Acute on chronic hypoxic respiratory failure- wean O2 to maintain sats >90%  · Pulmonary Fibrosis/Silicosis - BD,   · Needs 5-10L concentrator for home, 5L at rest, 7-8L with activity    · Steroids to PO  · Add BD, Acapella, mucinex  · RLE wound- Abx  · HTN  · 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 This will reduce hospitalizations. Discussed need for new higher O2 concentrator 5-10L for acute on chronic hypoxic respiratory failure. · No objection to discharge.  Follow up in office 1 month      Electronically signed by Val Ridley on 08/11/20 at 2:22 PM.

## 2020-08-11 NOTE — PROGRESS NOTES
[address]    PROGRESS NOTE             8/11/2020    7:37 AM    Name:   Nubia Lawrence  MRN:     [id number]     Acct:      [de-identified]   Room:   2114[facility] Day:  8  Admit Date:  8/3/2020 12:33 PM    PCP:  Argentina Parisi PA-C  Code Status:  Full Code    Subjective:     C/C:   Chief Complaint   Patient presents with    Shortness of Breath     Interval History Status: no change. Patient seen and examined at bedside this AM.  No acute events overnight. Continues to tolerate BiPaP well and has no new shortness of breath apart from baseline. On 4-6L of O2 nasal cannula. No chest pain or dizziness. Patient says he has some mild abdominal pain and has not had a BM in a few days. Spoke with nurse to give something for constipation. He is awaiting O2 eval and Trilogy eval today. Transitioned to PO steroids by pulmonology yesterday. Continues to cough up phlegm. Brief History:     Please see H&P. Review of Systems:     Review of Systems   Constitutional: Negative for chills, fatigue and fever. HENT: Negative for congestion and rhinorrhea. Respiratory: Positive for cough (chronic with phlegm, yellow/white in color, improving) and shortness of breath (chronic, no change). Cardiovascular: Negative for chest pain and palpitations. Gastrointestinal: Positive for abdominal pain (mild, feels constipated) and constipation. Negative for abdominal distention, diarrhea, nausea and vomiting. Genitourinary: Negative for difficulty urinating, frequency and hematuria. Neurological: Negative for dizziness, syncope and light-headedness. Psychiatric/Behavioral: Negative for dysphoric mood and sleep disturbance. Medications:      Allergies:  No Known Allergies    Current Meds:   Scheduled Meds:    predniSONE  40 mg Oral Daily    levalbuterol  1.25 mg Nebulization Q4H    ipratropium  0.5 mg Nebulization Q4H    guaiFENesin Labs:    CBC with Differential:    Lab Results   Component Value Date    WBC 10.2 08/09/2020    RBC 4.77 08/09/2020    RBC 4.54 12/28/2011    HGB 14.0 08/09/2020    HCT 43.7 08/09/2020     08/09/2020     12/28/2011    MCV 91.6 08/09/2020    MCH 29.3 08/09/2020    MCHC 32.0 08/09/2020    RDW 15.2 08/09/2020    LYMPHOPCT 3 08/09/2020    MONOPCT 7 08/09/2020    BASOPCT 0 08/09/2020    MONOSABS 0.71 08/09/2020    LYMPHSABS 0.31 08/09/2020    EOSABS 0.00 08/09/2020    BASOSABS 0.00 08/09/2020    DIFFTYPE NOT REPORTED 08/09/2020     BMP:    Lab Results   Component Value Date     08/11/2020    K 4.5 08/11/2020    CL 98 08/11/2020    CO2 37 08/11/2020    BUN 26 08/11/2020    LABALBU 3.1 08/03/2020    CREATININE 0.77 08/11/2020    CALCIUM 8.5 08/11/2020    GFRAA >60 08/11/2020    LABGLOM >60 08/11/2020    GLUCOSE 119 08/11/2020    GLUCOSE 157 12/28/2011       Lab Results   Component Value Date/Time    SPECIAL  12/26/2011 07:42 PM     RIGHT ANTECUBITAL Performed at [facility]     Lab Results   Component Value Date/Time    CULTURE NO GROWTH 6 DAYS 12/26/2011 07:42 PM    CULTURE  Performed at [facility] 12/26/2011 07:42 PM         Radiology:    Xr Chest Portable    Result Date: 8/4/2020  EXAMINATION: ONE X-RAY VIEW OF THE CHEST 8/3/2020 1:14 pm COMPARISON: December 26, 2011 HISTORY: ORDERING SYSTEM PROVIDED HISTORY: SOB TECHNOLOGIST PROVIDED HISTORY: SOB Reason for Exam: Patient states SOB 2 days Acuity: Acute Type of Exam: Initial FINDINGS: Extensive multifocal bilateral interstitial and airspace disease confluent throughout the mid and lower lungs. The heart is enlarged. The aorta is not well defined. Cardiomegaly with extensive bilateral airspace disease representing any form of edema, ARDS, or multifocal infection. Poorly defined aortic arch. While this likely reflects technique, consideration to follow-up CT imaging.          Physical Examination:        Physical to pulmonary fibrosis and silicosis.     Supraventricular arhythmia with multiple PACs and PVCs  - Cardiology on board  - EKG done in office visit day of admission showing atrial fibrillation; repeat EKG in ED showed sinus rhythm with PVCs and PACs; patient had EKG recordings on his phone that also showed PACs but no atrial fibrillation that he recorded for the month of July; cardiology reviewed outpatient ECG and determined it is not a-fib; no need for anticoagulation  - Echo done on 8/4 showed LVEF 50%, mild to moderate LVH, D-sign indicating RV pressure/volume overload; dilated right ventricle with reduced systolic function    Acute on chronic respiratory failure with hypoxemia secondary to pulmonary fibrosis and silicosis  - Diagnosed with silicosis in 7693, lung biopsy in 7/2019  - On home oxygen 4L, continue oxygen therapy here; currently tolerating 4-6 L on nasal cannula, pending home O2 eval today  - On ipratropium and xopenex q4h and budesonide-formeterol inhaler 2 puff q12h  - Pulmonology on board, increased home dose of prednisone to 20 mg; pulmonology transitioned from IV to PO steroids yesterday; awaiting pulmonology clearance for discharge  - BiPaP added for overnight, Trilogy evaluation today  - 8/8 CXR showed increased opacification of right upper lung that could be superimposed pneumonia     Systolic and diastolic CHF  - Echo done on 8/4 showed LVEF 50%, mild to moderate LVH, D-shaped septum indicating RV pressure/volume overload; dilated right ventricle with reduced systolic function  - BNP on admission was 868  - Cardiology on board, on lisinopril and lasix already, cardiology decreased lisinopril to 20 mg and changed amlodipine to diltiazem    Right lower extremity wound  - From dog scratch, s/p closure with stitches on 7/30  - Continue Augmentin, ends 8/10    Hypothyroidism  - Continue 25 mg levothyroxine  - TSH 1.44 (normal) in ED    HTN  - Continue home medications: was on amlodipine, benazapril, and lasix  - Cardiology discontinued amlodipine and started diltiazem instead and decreased dose of lisinopril to 20 mg, continue with home lasix dose    Major depression  - Continue cymbalta  - No suicidal ideation    Insomnia  - Zolpidem nightly at home, given ativan here    VTE prophylaxis: lovenox 30 mg daily  GI prophylaxis: pantoprazole 40mg PO once daily, will discharge on zegerid after discussing GERD with Dr. Char Phalen: awaiting pulmonology clearance and home O2 evaluation/Triology evaluation; discharge with home health care and outpatient OT    Will discuss plan with attending Dr. Torie Lopez.     Nando Song MD  8/11/2020  7:37 AM

## 2020-08-11 NOTE — DISCHARGE SUMMARY
[address]    Discharge Summary     Patient ID: Isra Cueva  :  1955   MRN: [id number]     ACCOUNT:  [de-identified]   Patient's PCP: Tamra Ramirez PA-C  Admit Date: 8/3/2020   Discharge Date: 2020  Length of Stay: 8  Code Status:  Full Code  Admitting Physician: Preeti Medina MD  Discharge Physician: Shayy Saleh MD     Active Discharge Diagnoses:       Primary Problem  Supraventricular tachycardia [facility])      [place] Problems    Diagnosis Date Noted    Dog scratch [B32. 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 male who was admitted on 8/3 for the management of  Supraventricular tachycardia ([address] and hypoxemia secondary to pulmonary fibrosis/silicosis who presented to the ER after being transferred from his outpatient PCP visit for possible atrial fibrillation and hypoxemia. Patient got an EKG done outpatient that showed possible atrial fibrillation and at that same time he developed worsening SOB and O2 sats dropped to 56% after running out of home O2 so EMS was called and patient was admitted for management. His CXR showed no acute processes. Repeat ECGs in the hospital showed multiple PACs/PVCs and cardiology reviewed his outpatient EKG and it was determined patient does not have atrial fibrillation, just PACs/PVCs.   Echo showed LVEF 50% with D-sign indicating RV pressure/volume overload and reduced systolic function. Cardiology optimized his outpatient medications. On 8/6 patient was going to be discharged but had another episode of hypoxemia with activity secondary to his pulmonary fibrosis/silicosis. Pulmonology started him on IV steroids from 8/6-8/10 and PO on 8/11. His home O2 therapy was optimized and patient was sent home with BiPaP until he can get Trilogy. The following medication changes were made: pantoprazole changed to zegerid 20 mg BID, lisinopril decreased from 40 to 20 mg daily, and amlodipine changed for cardizem 120 mg PO. Patient discharged on 40 mg prednisone until pulmonology appointment. Significant therapeutic interventions: None. Significant Diagnostic Studies:   Labs / Micro:  CBC:   Lab Results   Component Value Date    WBC 10.2 08/09/2020    RBC 4.77 08/09/2020    RBC 4.54 12/28/2011    HGB 14.0 08/09/2020    HCT 43.7 08/09/2020    MCV 91.6 08/09/2020    MCH 29.3 08/09/2020    MCHC 32.0 08/09/2020    RDW 15.2 08/09/2020     08/09/2020     12/28/2011     BMP:    Lab Results   Component Value Date    GLUCOSE 119 08/11/2020    GLUCOSE 157 12/28/2011     08/11/2020    K 4.5 08/11/2020    CL 98 08/11/2020    CO2 37 08/11/2020    ANIONGAP 5 08/11/2020    BUN 26 08/11/2020    CREATININE 0.77 08/11/2020    BUNCRER NOT REPORTED 08/11/2020    CALCIUM 8.5 08/11/2020    LABGLOM >60 08/11/2020    GFRAA >60 08/11/2020    GFR      08/11/2020    GFR NOT REPORTED 08/11/2020       Radiology:    Xr Chest (2 Vw)    Result Date: 8/8/2020  EXAMINATION: TWO XRAY VIEWS OF THE CHEST 8/8/2020 8:39 am COMPARISON: August 6, 2020 HISTORY: ORDERING SYSTEM PROVIDED HISTORY: silocosis, resp failure TECHNOLOGIST PROVIDED HISTORY: silocosis, resp failure Reason for Exam: silocosis, resp failure Acuity: Chronic Type of Exam: Ongoing FINDINGS: Scattered bilateral lung opacities with slightly increased right upper lobe opacity. Cardiomegaly.      Bilateral lung opacities which may be predominantly chronic. Slightly increased right upper lobe opacity could represent superimposed pneumonia. Xr Chest (2 Vw)    Result Date: 8/6/2020  EXAMINATION: TWO XRAY VIEWS OF THE CHEST 8/6/2020 1:24 pm COMPARISON: 08/03/2020. HISTORY: ORDERING SYSTEM PROVIDED HISTORY: dyspnea TECHNOLOGIST PROVIDED HISTORY: dyspnea Reason for Exam: dyspnea Acuity: Acute Type of Exam: Initial FINDINGS: The cardiac silhouette is enlarged and stable. There is coarsening of the interstitial markings throughout the lungs. There is improvement in the superimposed airspace disease, particularly in the perihilar regions bilaterally. There is no pleural fluid or pneumothorax. Interval improvement in bilateral pulmonary opacification, possibly partial clearing of edema or infiltrate. Diffuse interstitial changes and cardiomegaly. Xr Chest Portable    Result Date: 8/9/2020  EXAMINATION: ONE XRAY VIEW OF THE CHEST 8/9/2020 8:43 am COMPARISON: August 8, 2020 August 3, 2020 HISTORY: ORDERING SYSTEM PROVIDED HISTORY: infiltrate TECHNOLOGIST PROVIDED HISTORY: infiltrate Reason for Exam: infiltrate Acuity: Unknown Type of Exam: Unknown FINDINGS: Diffuse bilateral interstitial opacities are unchanged from the most recent study but improved from August 3, 2020. Cardiomegaly. Diffuse bilateral lung opacities with improvement since August 3, 2020. Xr Chest Portable    Result Date: 8/4/2020  EXAMINATION: ONE X-RAY VIEW OF THE CHEST 8/3/2020 1:14 pm COMPARISON: December 26, 2011 HISTORY: ORDERING SYSTEM PROVIDED HISTORY: SOB TECHNOLOGIST PROVIDED HISTORY: SOB Reason for Exam: Patient states SOB 2 days Acuity: Acute Type of Exam: Initial FINDINGS: Extensive multifocal bilateral interstitial and airspace disease confluent throughout the mid and lower lungs. The heart is enlarged. The aorta is not well defined.      Cardiomegaly with extensive bilateral airspace disease representing any form of edema, ARDS, or multifocal infection. Poorly defined aortic arch. While this likely reflects technique, consideration to follow-up CT imaging. Consultations:    Consults:     Final Specialist Recommendations/Findings:   IP CONSULT TO INTERNAL MEDICINE  IP CONSULT TO CARDIOLOGY  IP CONSULT TO PULMONOLOGY  IP CONSULT TO SOCIAL WORK      The patient was seen and examined on day of discharge and this discharge summary is in conjunction with any daily progress note from day of discharge. Discharge plan:       Disposition: Home with home health care    Physician Follow Up: Vianca Farley PA-C  [address] [facility] [address]  [address] [facility] [address] Follow up. 8/17/20 at 8:30, If unable to keep this scheduled apt. call within 24 hours to cancel. [id number]  [address] [facility]  [phone]    They will call you at home to set up a time to come to your house. [address] [facility]  [address] [facility]  [phone]      They are the company providing your new nebulizer/ Oxygen equipment/Concentrator/Trilogy. Call them when you get home so they can deliver.     Taylor Duran MD  [address] [place]  [phone]    Schedule an appointment as soon as possible for a visit in 1 month  follow up    Clark Dugan MD    Schedule an appointment as soon as possible for a visit in 4 weeks  follow-up       Requiring Further Evaluation/Follow Up POST HOSPITALIZATION/Incidental Findings:  - Follow with pulmonology for further prednisone dosing    Diet: regular diet    Activity: As tolerated    Instructions to Patient:  - Follow with cardiology outpatient in 4-6 weeks  - Follow with pulmonology outpatient in 4 weeks  - New baseline O2 is 5L, 7-8L with activity, discharged with different O2 concentrator and BiPaP    Discharge Medications:      Medication List      START taking these medications    dilTIAZem 120 MG extended release capsule  Commonly known as:  CARDIZEM CD  Take 1 capsule by mouth daily  Start taking on:  August 12, 2020     lisinopril 20 MG tablet  Commonly known as:  PRINIVIL;ZESTRIL  Take 1 tablet by mouth daily  Replaces:  benazepril 40 MG tablet     Omeprazole-Sodium Bicarbonate  MG Pack  Take 20 mg by mouth 2 times daily        CHANGE how you take these medications    predniSONE 20 MG tablet  Commonly known as:  DELTASONE  Take 2 tablets by mouth daily  Start taking on:  August 12, 2020  What changed:    · medication strength  · how much to take        CONTINUE taking these medications    Ambien CR 12.5 MG extended release tablet  Generic drug:  zolpidem     DULoxetine 30 MG extended release capsule  Commonly known as:  CYMBALTA     furosemide 20 MG tablet  Commonly known as:  LASIX     ibuprofen 800 MG tablet  Commonly known as:  ADVIL;MOTRIN  Take 1 tablet by mouth 2 times daily     levothyroxine 25 MCG tablet  Commonly known as:  SYNTHROID  TAKE 1 TABLET BY MOUTH ONCE DAILY     * ProAir  (90 Base) MCG/ACT inhaler  Generic drug:  albuterol sulfate HFA     * albuterol (2.5 MG/3ML) 0.083% nebulizer solution  Commonly known as:  PROVENTIL     sodium chloride 0.65 % nasal spray  Commonly known as: [place] Spray  1 spray by Nasal route as needed for Congestion. * This list has 2 medication(s) that are the same as other medications prescribed for you. Read the directions carefully, and ask your doctor or other care provider to review them with you. STOP taking these medications    amLODIPine 5 MG tablet  Commonly known as:  NORVASC     amoxicillin-clavulanate 500-125 MG per tablet  Commonly known as:   Augmentin     benazepril 40 MG tablet  Commonly known as:  LOTENSIN  Replaced by:  lisinopril 20 MG tablet     pantoprazole 40 MG tablet  Commonly known as:  PROTONIX           Where to Get Your Medications      These medications were sent to [address] - P [phone] Paresh Rouse [phone]  [address] [place] [address]    Phone:  [phone]   · dilTIAZem 120 MG extended release capsule  · lisinopril 20 MG tablet  · Omeprazole-Sodium Bicarbonate  MG Pack     Information about where to get these medications is not yet available    Ask your nurse or doctor about these medications  · predniSONE 20 MG tablet         Time Spent on discharge is  35 mins in patient examination, evaluation, counseling as well as medication reconciliation, prescriptions for required medications, discharge plan and follow up. Electronically signed by   Federica Shahid MD  8/11/2020  4:33 PM      Thank you Dr. Tristian Joshi PA-C for the opportunity to be involved in this patient's care.

## 2020-08-11 NOTE — CARE COORDINATION
ONGOING DISCHARGE PLAN:    Spoke with patient regarding discharge plan and patient confirms that plan is still to DC to home w/ Wife & VNS, [facility]. Pt.did qualify for Higher Dose Oxygen Concentrator as per Home O2 Eval, was 82% on 6LNC. Pt. Is current w/ HCS. Kortney Puckett, from [facility] is following for his new Concentrator, [address]. Pt. Is Current w/ Workers [employer] for all Oxygen needs. Has C9 signed by Dr. Liz Esteves & all necessary paperwork for his Trilogy. This may need a PA, however, they are working on getting him a Loner BIPAP for home. Kortney Puckett, also has DME orders for [facility] is awaiting DME order from Dr. Liz Esteves, for New O2/Concentrator. They will deliver portable tank to room for home. Pt. Remains on PO Prednisone & IV Lasix, 20 mg, BID. Cardio is following for this. Anticipate DC today. Will continue to follow for additional discharge needs.     Electronically signed by Verenice Bush RN on 8/11/2020 at 10:28 AM

## 2020-08-11 NOTE — PROGRESS NOTES
Radiologic, Microbiologic, and Diagnostic studies reviewed  Data ReviewCBC:   Recent Labs     08/09/20  0426   WBC 10.2   RBC 4.77   HGB 14.0   HCT 43.7        BMP:   Recent Labs     08/09/20  0426 08/10/20  0447 08/11/20  0514   GLUCOSE 175* 159* 119*    141 140   K 4.6 4.5 4.5   BUN 23 21 26*   CREATININE 0.95 0.76 0.77   CALCIUM 8.7 8.4* 8.5*     ABGs:   Recent Labs     08/10/20  1154   PHART 7.425   PO2ART 71.7*   QCX4TDG 60.1*   HVH6LKU 39.4*   L5YSYYOJ 93.8*      PT/INR:  No results found for: PTINR    ASSESSMENT / PLAN:  · A fib- cardiology consult   · Acute on chronic hypoxic respiratory failure- wean O2 to maintain sats >90%  · Pulmonary Fibrosis/Silicosis - BD,   · Needs 5-10L concentrator for home, 5L at rest, 7-8L with activity    · Steroids to PO  · Add BD, Acapella, mucinex  · RLE wound- Abx  · HTN  · Chronic hypercapnic respiratory failure   · Face to face with patient regarding need for NIV for chronic hypercapnic respiratory failure. This will reduce hospitalizations. Discussed need for new higher O2 concentrator 5-10L for acute on chronic hypoxic respiratory failure. · No objection to discharge.  Follow up in office 1 month  · Discussed with Dr. Krystal Shah        Electronically signed by Mary Easley on 08/11/20 at 10:01 AM.

## 2020-08-11 NOTE — PROGRESS NOTES
Physical Therapy  Facility/Department: [facility] PROGRESSIVE CARE  Daily Treatment Note  NAME: Antonietta Saldana  : 1955  MRN: [id number]    Date of Service: 2020    Discharge Recommendations:  Patient would benefit from continued therapy after discharge     Assessment   Body structures, Functions, Activity limitations: Decreased functional mobility ; Decreased endurance  Assessment: Patient functionally independent. Significantly limited by oxygen saturation and desat with minimal activity. Patient would benefit from home health PT with the intention to progress to outpatient pulmonary rehab if able. Treatment Diagnosis: difficulty walking  Barriers to Learning: None Known  REQUIRES PT FOLLOW UP: Yes  Activity Tolerance  Activity Tolerance: Patient limited by endurance;Treatment limited secondary to medical complications (free text)     Patient Diagnosis(es): The primary encounter diagnosis was Atrial fibrillation, unspecified type ([facility] A diagnosis of Silicosis ([address] was also pertinent to this visit. has a past medical history of Depressive disorder, not elsewhere classified, Impotence of organic origin, Insomnia, unspecified, Myopathy, unspecified, Postinflammatory pulmonary fibrosis ([address] Unspecified essential hypertension, and Unspecified hypothyroidism. has a past surgical history that includes Lung biopsy (Left). Restrictions  Restrictions/Precautions  Restrictions/Precautions: Up as Tolerated  Required Braces or Orthoses?: No  Subjective   General  Chart Reviewed: Yes  Family / Caregiver Present: No  Subjective  Subjective: Patient up in chair upon arrival. Pleasant and cooperative, limited by medical condition and requiring significant time to recover from minimal activity. General Comment  Comments: Patient on 5L O2. Treatment ended early due to MD, RN and  entering room.     Orientation  Orientation  Overall Orientation Status: Within Normal Limits  Objective   Transfers  Sit to Stand: Independent  Stand to sit: Independent  Bed to Chair: Independent  Ambulation  Ambulation?: No     Other exercises  Other exercises?: Yes  Other exercises 1: Seated ankle pumps x15 bilaterally  Other exercises 2: Sit to stand x1 (Desat, SpO2 72% on 5L, cues for breathing techniques. After 8 minutes of recovery time, SpO2 85%. MD and RN present in room and provided update)      Goals  Short term goals  Time Frame for Short term goals: 10 visits  Short term goal 1: walk up/down 1 step with cristin HR supervision  Short term goal 2: ambulate 50 ft to get around his home Indep while wife at work . Short term goal 3: demo independence with HEP as above  Patient Goals   Patient goals : I want to get home health PT and breathing treatments.      Plan    Plan  Times per week: 5-6  Times per day: Daily  Plan weeks: 2  Specific instructions for Next Treatment: Attempt ambulation, review HEP  Current Treatment Recommendations: Functional Mobility Training, Endurance Training, Gait Training  Safety Devices  Type of devices: Left in chair, Nurse notified(Patient left in chair with luisa, RN My Agent and .)     Therapy Time   Individual Concurrent Group Co-treatment   Time In formerly [facility]         Time Out 0936         Minutes [place], [place]

## 2020-08-12 NOTE — TELEPHONE ENCOUNTER
Leti [facility] Transitions Initial Follow Up Call    Outreach made within 2 business days of discharge: yes    Patient: Elen Caraballo Patient : 1955   MRN: [id number]  Reason for Admission: There are no discharge diagnoses documented for the most recent discharge. Discharge Date: 20       Spoke with: Daphne Murguia    Discharge department/facility: [facility] Interactive Patient Contact:  Was patient able to fill all prescriptions: Yes  Was patient instructed to bring all medications to the follow-up visit: Yes  Is patient taking all medications as directed in the discharge summary?  Yes  Does patient understand their discharge instructions: Yes  Does patient have questions or concerns that need addressed prior to 7-14 day follow up office visit: yes -     Scheduled appointment with PCP within 7-14 days    Follow Up  Future Appointments   Date Time Provider Andrea Watson   2020  8:30 AM Radha Ray PA-C [address] [place] [address]

## 2020-08-17 NOTE — TELEPHONE ENCOUNTER
Patient called in stating the Omeprazole- Sodium is not covered by his insurance and will cost over $2,000.  Patient is requesting another medication that is covered by his insurance be sent to the pharmacy ([address] [place] [address] on [facility])

## 2020-08-26 NOTE — TELEPHONE ENCOUNTER
Patients wound is still open. It does look like it is getting better. Would like another round of antibiotics please or please advise. Patient has an appointment scheduled for this Friday. This was changed to a Phone visit due to the patient is still having SOB and they are trying to get his oxygen level straightened out. Please advise on antibiotics.

## 2020-09-03 PROBLEM — S81.801S LEG WOUND, RIGHT, SEQUELA: Status: ACTIVE | Noted: 2020-01-01

## 2020-09-03 NOTE — PROGRESS NOTES
[address] [facility]:     [address]  [address] [facility] [address] [address] [facility] p: [phone] f: [facility] [facility],[address] Floor:     [address]  [address] [place] [address]  [place] [address] [phone]  WOUND CARE Dept: [phone]   FAX NUMBER [unfilled]    Patient Information:      Yumiko Rondon   [address]  [facility] [address]   [phone]   : 1955  AGE: 60 y.o. GENDER: male   TODAYS DATE:  9/3/2020    Insurance:      PRIMARY INSURANCE:  Plan: [employer] [address]  Coverage: [employer]ICAL [employer]  Effective Date: 2015  [id number] - (Worker's Comp)    SECONDARY INSURANCE:  Plan: MEDICARE PART A AND B  Coverage: MEDICARE  Effective Date: 2015  [unfilled]    [unfilled]   [unfilled]     Patient Wound Information:      Problem List Items Addressed This Visit     Current chronic use of systemic steroids    Dog scratch    Leg wound, right, sequela - Primary          WOUNDS REQUIRING DRESSING SUPPLIES:     Wound 20 Pretibial Right; Anterior #1 right lower medial pretib? s/p dog scratch (Active)   Wound Image   20 1447   Wound Traumatic 20 1447   Dressing Status Clean; Intact; Other (Comment) 20 1447   Dressing Changed Changed/New 20 1447   Dressing/Treatment Vaseline gauze;Dry dressing 20 0844   Wound Cleansed Rinsed/Irrigated with saline 20 1447   Dressing Change Due 20 0844   Wound Length (cm) 2.7 cm 20 1447   Wound Width (cm) 5.6 cm 20 1447   Wound Depth (cm) 0.1 cm 20 1447   Wound Surface Area (cm^2) 15.12 cm^2 20 1447   Change in Wound Size % (l*w) 62.2 20 1447   Wound Volume (cm^3) 1.51 cm^3 20 1447   Post-Procedure Length (cm) 2.7 cm 20 1447   Post-Procedure Width (cm) 5.6 cm 20 1447   Post-Procedure Depth (cm) 0.1 cm 20 1447   Post-Procedure Surface Area (cm^2) 15.12 cm^2 20 1447   Post-Procedure Volume (cm^3) 1.51 cm^3 09/03/20 1447   Wound Assessment Clean;Denuded;Drainage;Edema;Painful;Red;Yellow; Other (Comment) 09/03/20 1447   Drainage Amount Moderate 09/03/20 1447   Drainage Description Serosanguinous 09/03/20 1447   Odor None 09/03/20 1447   Margins Attached edges 09/03/20 1447   Ruth-wound Assessment Maceration;Pink 09/03/20 1447   Red%Wound Bed 50 09/03/20 1447   Yellow%Wound Bed 50 09/03/20 1447   Culture Taken No 08/09/20 1400   Number of days: 31          Supplies Requested :      WOUND #: 1 right medial pretib   PRIMARY DRESSING:  Other: abdifatah to wound   Cover and Secure with:  Other cover with dry gauze and ABD, wrap with kerlix, secure with paper tape     FREQUENCY OF DRESSING CHANGES:  Daily       ADDITIONAL ITEMS:  [] Gloves Small  [x] Gloves Medium [] Gloves Large [] Gloves XLarge  [] Tape 1\" [x] Tape 2\" [] Tape 3\"  [] Medipore Tape  [x] Saline  [] Skin Prep   [] Adhesive Remover   [] Cotton Tip Applicators   [x] Other: 4x4 gauze    Patient Wound(s) Debrided: [x] Yes   [] No    Debribement Type: subcutaneous tissue    Debridement Date: 09/03/2020    Patient currently being seen by Home Health: [x] Yes   [] No    Duration for needed supplies:  []15  [x]30  []60  []90 Days    Provider Information:      Provider: Suzy Bullock CNP    NPI: [id number]

## 2020-09-03 NOTE — PLAN OF CARE
Problem: Pain:  Goal: Pain level will decrease  Description: Pain level will decrease  Outcome: Ongoing  Goal: Control of acute pain  Description: Control of acute pain  Outcome: Ongoing  Goal: Control of chronic pain  Description: Control of chronic pain  Outcome: Ongoing     Problem: Wound:  Goal: Will show signs of wound healing; wound closure and no evidence of infection  Description: Will show signs of wound healing; wound closure and no evidence of infection  Outcome: Ongoing     Problem: Falls - Risk of:  Goal: Will remain free from falls  Description: Will remain free from falls  Outcome: Ongoing

## 2020-09-03 NOTE — PROGRESS NOTES
[address]   Progress Note and Procedure Note      [address] RECORD NUMBER:  [id number]  AGE: 60 y.o. GENDER: male  : 1955  EPISODE DATE:  9/3/2020    Subjective:     Chief Complaint   Patient presents with    Wound Check         HISTORY of PRESENT ILLNESS HPI     Sachi Fajardo is a 60 y.o. male who presents today for wound/ulcer evaluation. History of Wound Context: patient presents to wound clinic today for evaluation of right lower leg wound that occurred 2020 when his dog scratched his leg with her paw. Was seen in ER at [facility] and had sutures placed. Had follow up with PCP on 8/3/2020 to have sutures removed however was not done d/t him requiring admission to hospital d/t a fib and hypoxia. Was admitted 8/3/2020 - 2020 and now has home care [facility] coming into home. Has been putting dry dressing on wound daily. Had more drainage last week - started doxycycline per PCP. Finished all doses of antibiotic.  Sutures still in place   Wound/Ulcer Pain Timing/Severity: intermittent  Quality of pain: aching  Severity:  2 / 10   Modifying Factors: Pain worsens with debridement  Associated Signs/Symptoms: drainage and pain    Ulcer Identification:  Ulcer Type: traumatic  Contributing Factors: decreased mobility and decreased tissue oxygenation    Wound: N/A        PAST MEDICAL HISTORY        Diagnosis Date    Depressive disorder, not elsewhere classified     Impotence of organic origin     Insomnia, unspecified     Myopathy, unspecified     Postinflammatory pulmonary fibrosis ([facility] [address]     Unspecified essential hypertension     Unspecified hypothyroidism        PAST SURGICAL HISTORY    Past Surgical History:   Procedure Laterality Date    LUNG BIOPSY Left     LUNG BIOPSY Left     10 years ago       FAMILY HISTORY    Family History   Problem Relation Age of Onset    High Blood Pressure Mother     Cancer Mother     Stroke Mother     High Blood Pressure Father     Cancer Sister     Heart Disease Other         Family in general       SOCIAL HISTORY    Social History     Tobacco Use    Smoking status: Former Smoker     Packs/day: 0.25     Years: 15.00     Pack years: 3.75     Last attempt to quit: 2/18/2005     Years since quitting: 15.5    Smokeless tobacco: Never Used   Substance Use Topics    Alcohol use: Yes     Alcohol/week: 0.0 standard drinks     Comment: every weekend- 6 Beers/Burbin    Drug use: No       ALLERGIES    No Known Allergies    MEDICATIONS    Current Outpatient Medications on File Prior to Encounter   Medication Sig Dispense Refill    furosemide (LASIX) 20 MG tablet Take 1 tablet by mouth daily as needed (swelling) 90 tablet 3    ibuprofen (ADVIL;MOTRIN) 800 MG tablet Take 1 tablet by mouth 2 times daily 180 tablet 3    levothyroxine (SYNTHROID) 25 MCG tablet Take 1 tablet by mouth Daily 90 tablet 3    albuterol (PROVENTIL) (2.5 MG/3ML) 0.083% nebulizer solution Take 3 mLs by nebulization every 6 hours as needed for Wheezing or Shortness of Breath 360 each 3    dilTIAZem (CARDIZEM CD) 120 MG extended release capsule Take 1 capsule by mouth daily 30 capsule 3    predniSONE (DELTASONE) 20 MG tablet Take 2 tablets by mouth daily 62 tablet 0    Omeprazole-Sodium Bicarbonate  MG PACK Take 20 mg by mouth 2 times daily 30 each 3    lisinopril (PRINIVIL;ZESTRIL) 20 MG tablet Take 1 tablet by mouth daily 30 tablet 3    zolpidem (AMBIEN CR) 12.5 MG extended release tablet Take 12.5 mg by mouth nightly as needed for Sleep.  PROAIR  (90 BASE) MCG/ACT inhaler Inhale 2 puffs into the lungs every 6 hours as needed for Wheezing       sodium chloride (ALTAMIST SPRAY) 0.65 % nasal spray 1 spray by Nasal route as needed for Congestion. 1 Bottle 3    DULoxetine (CYMBALTA) 30 MG capsule Take 30 mg by mouth daily   0     No current facility-administered medications on file prior to encounter.         REVIEW OF SYSTEMS    Pertinent items are noted in HPI.     Objective:      /75   Pulse 85   Temp 97.5 °F (36.4 °C) (Tympanic)   Resp 20   Ht 6' 8\" (2.032 m)   Wt 262 lb (118.8 kg)   BMI 28.78 kg/m²     Wt Readings from Last 3 Encounters:   09/03/20 262 lb (118.8 kg)   08/10/20 262 lb 12.6 oz (119.2 kg)   08/03/20 259 lb (117.5 kg)       PHYSICAL EXAM    General Appearance: alert and oriented to person, place and time, well developed and well- nourished, in no acute distress  Skin: warm and dry, no rash or erythema, right lower leg wound   Head: normocephalic and atraumatic  Eyes: pupils equal, round, and conjunctivae normal  Pulmonary/Chest: clear to auscultation bilaterally- no wheezes, rales or rhonchi, normal air movement, no respiratory distress  Cardiovascular: normal rate, regular rhythm, normal S1 and S2, no murmurs  Abdomen: soft, non-tender, non-distended, normal bowel sounds  Extremities: no cyanosis, clubbing or edema  Musculoskeletal: no joint swelling, deformity or tenderness  Neurologic: gait, coordination and speech normal      Assessment:     Problem List Items Addressed This Visit     Current chronic use of systemic steroids    Relevant Medications    lidocaine (XYLOCAINE) 4 % external solution (Start on 9/3/2020  4:15 PM)    Other Relevant Orders    Supply: Wound Cleanser    Supply: Wound Dressings    Supply: Pack Wound    Supply: Cover and Secure    Dog scratch    Relevant Medications    lidocaine (XYLOCAINE) 4 % external solution (Start on 9/3/2020  4:15 PM)    Other Relevant Orders    Supply: Wound Cleanser    Supply: Wound Dressings    Supply: Pack Wound    Supply: Cover and Secure    Leg wound, right, sequela - Primary    Relevant Medications    lidocaine (XYLOCAINE) 4 % external solution (Start on 9/3/2020  4:15 PM)    Other Relevant Orders    Supply: Wound Cleanser    Supply: Wound Dressings    Supply: Pack Wound    Supply: Cover and Secure           Procedure Note  Indications:  Based on my examination of this patient's wound(s)/ulcer(s) today, debridement is required to promote healing and evaluate the wound base. Performed by: YOSSI Rae CNP    Consent obtained:  Yes    Time out taken:  Yes    Pain Control: Anesthetic  Anesthetic: 4% Lidocaine Liquid Topical       Debridement:Excisional Debridement    Using curette the wound(s)/ulcer(s) was/were sharply debrided down through and including the removal of subcutaneous tissue. Devitalized Tissue Debrided:  fibrin, biofilm and slough    Pre Debridement Measurements:  Are located in the [facility]  Documentation Flow Sheet    Wound/Ulcer #: 1    Post Debridement Measurements:  Wound/Ulcer Descriptions are Pre Debridement except measurements:    Wound 08/03/20 Pretibial Right; Anterior #1 right lower medial pretib? s/p dog scratch (Active)   Wound Image   09/03/20 1447   Wound Traumatic 09/03/20 1447   Dressing Status Clean; Intact; Other (Comment) 09/03/20 1447   Dressing Changed Changed/New 09/03/20 1447   Dressing/Treatment Vaseline gauze;Dry dressing 08/11/20 0844   Wound Cleansed Rinsed/Irrigated with saline 09/03/20 1447   Dressing Change Due 08/12/20 08/11/20 0844   Wound Length (cm) 2.7 cm 09/03/20 1447   Wound Width (cm) 5.6 cm 09/03/20 1447   Wound Depth (cm) 0.1 cm 09/03/20 1447   Wound Surface Area (cm^2) 15.12 cm^2 09/03/20 1447   Change in Wound Size % (l*w) 62.2 09/03/20 1447   Wound Volume (cm^3) 1.51 cm^3 09/03/20 1447   Post-Procedure Length (cm) 2.7 cm 09/03/20 1447   Post-Procedure Width (cm) 5.6 cm 09/03/20 1447   Post-Procedure Depth (cm) 0.1 cm 09/03/20 1447   Post-Procedure Surface Area (cm^2) 15.12 cm^2 09/03/20 1447   Post-Procedure Volume (cm^3) 1.51 cm^3 09/03/20 1447   Wound Assessment Clean;Denuded;Drainage;Edema;Painful;Red;Yellow; Other (Comment) 09/03/20 1447   Drainage Amount Moderate 09/03/20 1447   Drainage Description Serosanguinous 09/03/20 1447   Odor None 09/03/20 1447   Margins Attached edges 09/03/20 1447   Ruth-wound Assessment Maceration;Pink 09/03/20 1447   Red%Wound Bed 50 09/03/20 1447   Yellow%Wound Bed 50 09/03/20 1447   Culture Taken No 08/09/20 1400   Number of days: 31          Percent of Wound(s)/Ulcer(s) Debrided: 100%    Total Surface Area Debrided:  15.12 sq cm       Diabetic/Pressure/Non Pressure Ulcers only:  Ulcer: Non-Pressure ulcer, fat layer exposed      Estimated Blood Loss:  Minimal    Hemostasis Achieved:  by pressure    Procedural Pain:  2  / 10     Post Procedural Pain:  0 / 10     Response to treatment:  Well tolerated by patient. Plan:     Removed 13 intact sutures from wound, patient tolerated very well    Treatment Note please see attached Discharge Instructions    Written patient dismissal instructions given to patient and signed by patient or POA. Discharge Instructions         [address]      Visit  Discharge Instructions / Physician Orders  DATE: 09/03/2020     Home Care: [facility] (established patient- pt or daughter may be able to do dressing changes if taught)     SUPPLIES ORDERED THRU: [employer] (ordered 09/03/2020)     Wound Location:  Right lower leg     Cleanse with: Liquid antibacterial soap and water, rinse well      Dressing Orders:  Brittani to wound, cover with dry gauze and ABD, wrap with kerlix, secure with paper tape     Frequency:  Daily     Additional Orders: Increase protein to diet (meat, cheese, eggs, fish, peanut butter, nuts and beans)  Multivitamin daily    MY CHART []     Smart Device  []     HYPERBARIC TREATMENT-                TREATMENT #     Your next appointment with the [address] [place] [address] is in 1 week                                                                                                   ROOM TYPE   [] CHAIR     [] BED   [] EITHER        TIME [] 45 MIN     [] 60 MIN     (Please note your next appointment above and if you are unable to keep, kindly give a 24 hour notice.  Thank you.)     If you experience any of the following, please call the [facility] [address] during business hours:  [phone]     * Increase in Pain  * Temperature over 101  * Increase in drainage from your wound  * Drainage with a foul odor  * Bleeding  * Increase in swelling  * Need for compression bandage changes due to slippage, breakthrough drainage. If you need medical attention outside of the business hours of the [address] please contact your PCP or go to the nearest emergency room. The information contained in the After Visit Summary has been reviewed with me, the patient and/or responsible adult, by my health care provider(s). I had the opportunity to ask questions regarding this information.  I have elected to receive;      []After Visit Summary  [x]Comprehensive Discharge Instruction      Patient signature______________________________________Date:________  Electronically signed by Tadeo Sellers RN on 9/3/2020 at 3:07 PM  Electronically signed by YOSSI Jackson CNP on 9/3/2020 at 3:24 PM              Electronically signed by YOSSI Jackson CNP on 9/3/2020 at 3:48 PM

## 2020-09-10 NOTE — PROGRESS NOTES
[address]   Progress Note and Procedure Note      [address] RECORD NUMBER:  [id number]  AGE: 60 y.o. GENDER: male  : 1955  EPISODE DATE:  9/10/2020    Subjective:     Chief Complaint   Patient presents with    Wound Check         HISTORY of PRESENT ILLNESS HPI     Rita Ojeda is a 60 y.o. male who presents today for wound/ulcer evaluation. History of Wound Context: right lower leg ulcer since 2020 when his dog scratched his leg with her paw. Wound/Ulcer Pain Timing/Severity: intermittent  Quality of pain: aching  Severity:  2  10   Modifying Factors: Pain worsens with debridement  Associated Signs/Symptoms: none    Ulcer Identification:  Ulcer Type: traumatic  Contributing Factors: decreased mobility and decreased tissue oxygenation    Wound: N/A        PAST MEDICAL HISTORY        Diagnosis Date    Depressive disorder, not elsewhere classified     Impotence of organic origin     Insomnia, unspecified     Myopathy, unspecified     Postinflammatory pulmonary fibrosis (HCC)     Unspecified essential hypertension     Unspecified hypothyroidism        PAST SURGICAL HISTORY    Past Surgical History:   Procedure Laterality Date    LUNG BIOPSY Left     LUNG BIOPSY Left     10 years ago       FAMILY HISTORY    Family History   Problem Relation Age of Onset    High Blood Pressure Mother     Cancer Mother     Stroke Mother     High Blood Pressure Father     Cancer Sister     Heart Disease Other         Family in general       SOCIAL HISTORY    Social History     Tobacco Use    Smoking status: Former Smoker     Packs/day: 0.25     Years: 15.00     Pack years: 3.75     Last attempt to quit: 2005     Years since quitting: 15.5    Smokeless tobacco: Never Used   Substance Use Topics    Alcohol use:  Yes     Alcohol/week: 0.0 standard drinks     Comment: every weekend- 6 Beers/[place]in    Drug use: No       ALLERGIES    No Known Allergies    MEDICATIONS    Current Outpatient Medications on File Prior to Encounter   Medication Sig Dispense Refill    furosemide (LASIX) 20 MG tablet Take 1 tablet by mouth daily as needed (swelling) 90 tablet 3    levothyroxine (SYNTHROID) 25 MCG tablet Take 1 tablet by mouth Daily 90 tablet 3    dilTIAZem (CARDIZEM CD) 120 MG extended release capsule Take 1 capsule by mouth daily 30 capsule 3    predniSONE (DELTASONE) 20 MG tablet Take 2 tablets by mouth daily 62 tablet 0    Omeprazole-Sodium Bicarbonate  MG PACK Take 20 mg by mouth 2 times daily 30 each 3    lisinopril (PRINIVIL;ZESTRIL) 20 MG tablet Take 1 tablet by mouth daily 30 tablet 3    sodium chloride (ALTAMIST SPRAY) 0.65 % nasal spray 1 spray by Nasal route as needed for Congestion. 1 Bottle 3    DULoxetine (CYMBALTA) 30 MG capsule Take 30 mg by mouth daily   0    ibuprofen (ADVIL;MOTRIN) 800 MG tablet Take 1 tablet by mouth 2 times daily 180 tablet 3    albuterol (PROVENTIL) (2.5 MG/3ML) 0.083% nebulizer solution Take 3 mLs by nebulization every 6 hours as needed for Wheezing or Shortness of Breath 360 each 3    zolpidem (AMBIEN CR) 12.5 MG extended release tablet Take 12.5 mg by mouth nightly as needed for Sleep.  PROAIR  (90 BASE) MCG/ACT inhaler Inhale 2 puffs into the lungs every 6 hours as needed for Wheezing        No current facility-administered medications on file prior to encounter. REVIEW OF SYSTEMS    Pertinent items are noted in HPI.     Objective:      /67   Pulse 96   Temp 97.1 °F (36.2 °C) (Tympanic)   Resp 22   Ht 6' 8\" (2.032 m)   Wt 262 lb (118.8 kg)   BMI 28.78 kg/m²     Wt Readings from Last 3 Encounters:   09/10/20 262 lb (118.8 kg)   09/03/20 262 lb (118.8 kg)   08/10/20 262 lb 12.6 oz (119.2 kg)       PHYSICAL EXAM    General Appearance: alert and oriented to person, place and time, well-developed and well-nourished, in no acute distress  Skin: warm and dry, no rash or erythema  Head: normocephalic and atraumatic  Eyes: pupils equal, round, and conjunctivae normal  Pulmonary/Chest: normal air movement, no respiratory distress  Extremities: no cyanosis and no clubbing  Musculoskeletal: no joint swelling, deformity or tenderness  Neurologic: gait, coordination normal and speech normal      Assessment:     Problem List Items Addressed This Visit     Current chronic use of systemic steroids    Relevant Orders    Supply: Wound Cleanser    Supply: Wound Dressings    Supply: Pack Wound    Supply: Cover and Secure    Dog scratch    Relevant Orders    Supply: Wound Cleanser    Supply: Wound Dressings    Supply: Pack Wound    Supply: Cover and Secure    Leg wound, right, sequela - Primary    Relevant Orders    Supply: Wound Cleanser    Supply: Wound Dressings    Supply: Pack Wound    Supply: Cover and Secure           Procedure Note  Indications:  Based on my examination of this patient's wound(s)/ulcer(s) today, debridement is required to promote healing and evaluate the wound base. Performed by: YOSSI Padilla CNP    Consent obtained:  Yes    Time out taken:  Yes    Pain Control: Anesthetic  Anesthetic: 4% Lidocaine Liquid Topical       Debridement:Excisional Debridement    Using curette the wound(s)/ulcer(s) was/were sharply debrided down through and including the removal of subcutaneous tissue. Devitalized Tissue Debrided:  fibrin, biofilm and slough    Pre Debridement Measurements:  Are located in the [place]  Documentation Flow Sheet    Wound/Ulcer #: 1    Post Debridement Measurements:  Wound/Ulcer Descriptions are Pre Debridement except measurements:    Wound 08/03/20 Pretibial Right; Anterior #1 right lower medial pretib? s/p dog scratch (Active)   Wound Image   09/03/20 1447   Wound Traumatic 09/10/20 1501   Dressing Status Clean; Old drainage 09/10/20 1501   Dressing Changed Changed/New 09/10/20 1501   Dressing/Treatment Vaseline gauze;Dry dressing 08/11/20 1244   Wound Cleansed Rinsed/Irrigated with saline 09/10/20 1501   Dressing Change Due 08/12/20 08/11/20 0844   Wound Length (cm) 2.7 cm 09/10/20 1501   Wound Width (cm) 4.6 cm 09/10/20 1501   Wound Depth (cm) 0.2 cm 09/10/20 1501   Wound Surface Area (cm^2) 12.42 cm^2 09/10/20 1501   Change in Wound Size % (l*w) 68.95 09/10/20 1501   Wound Volume (cm^3) 2.48 cm^3 09/10/20 1501   Post-Procedure Length (cm) 2.7 cm 09/10/20 1501   Post-Procedure Width (cm) 4.6 cm 09/10/20 1501   Post-Procedure Depth (cm) 0.2 cm 09/10/20 1501   Post-Procedure Surface Area (cm^2) 12.42 cm^2 09/10/20 1501   Post-Procedure Volume (cm^3) 2.48 cm^3 09/10/20 1501   Wound Assessment Clean;Drainage;Granulation tissue;Painful;Red;Yellow 09/10/20 1501   Drainage Amount Moderate 09/10/20 1501   Drainage Description Serosanguinous 09/10/20 1501   Odor None 09/10/20 1501   Margins Attached edges 09/10/20 1501   Ruth-wound Assessment Edema; Red 09/10/20 1501   Red%Wound Bed 50 09/10/20 1501   Yellow%Wound Bed 50 09/10/20 1501   Culture Taken No 08/09/20 1400   Number of days: 38          Percent of Wound(s)/Ulcer(s) Debrided: 100%    Total Surface Area Debrided:  12.42 sq cm       Diabetic/Pressure/Non Pressure Ulcers only:  Ulcer: Non-Pressure ulcer, fat layer exposed      Estimated Blood Loss:  Minimal    Hemostasis Achieved:  by pressure    Procedural Pain:  2  / 10     Post Procedural Pain:  0 / 10     Response to treatment:  Well tolerated by patient. Plan:     Treatment Note please see attached Discharge Instructions    Written patient dismissal instructions given to patient and signed by patient or POA. Discharge Instructions         [id number]                                 Visit  Discharge Instructions / Physician Orders  DATE: 09/10/2020     Home Care:  [facility]      SUPPLIES ORDERED THRU: [facility] (ordered 09/03/2020)     Wound Location:  Right lower leg     Cleanse with: Liquid antibacterial soap and water, rinse well      Dressing Orders:  Brittani to wound, cover with dry gauze and ABD, wrap with kerlix, secure with paper tape     Frequency:  Daily     Additional Orders: Increase protein to diet (meat, cheese, eggs, fish, peanut butter, nuts and beans)  Multivitamin daily     MY CHART []?     Smart Device  []?      HYPERBARIC TREATMENT-                TREATMENT #     Your next appointment with the [facility] is in 2 weeks                                                                                                   ROOM TYPE   []? CHAIR     []? BED   []? EITHER        TIME []? 45 MIN     []? 60 MIN     (Please note your next appointment above and if you are unable to keep, kindly give a 24 hour notice. Thank you.)     If you experience any of the following, please call the [facility] during business hours:  [phone]     * Increase in Pain  * Temperature over 101  * Increase in drainage from your wound  * Drainage with a foul odor  * Bleeding  * Increase in swelling  * Need for compression bandage changes due to slippage, breakthrough drainage.     If you need medical attention outside of the business hours of the [facility] please contact your PCP or go to the nearest emergency room. The information contained in the After Visit Summary has been reviewed with me, the patient and/or responsible adult, by my health care provider(s). I had the opportunity to ask questions regarding this information. I have elected to receive;      []? After Visit Summary  [x]? Comprehensive Discharge Instruction        Patient signature______________________________________Date:________  Electronically signed by Idalmis Ascencio RN on 9/10/2020 at 3:29 PM  Electronically signed by YOSSI Barajas CNP on 9/10/2020 at 3:34 PM          Electronically signed by YOSSI Barajas CNP on 9/10/2020 at 4:10 PM

## 2020-09-23 NOTE — TELEPHONE ENCOUNTER
Patient calling to cancel his [facility] wound care for 9-24-20 due to not feeling well and breathing difficult, does not want to come out, rescheduled for 9-28-20

## 2020-09-28 NOTE — TELEPHONE ENCOUNTER
Patient calling to cancel his [facility] wound care appointment today stating he is still having breathing issues (not related to Covid 19) and would like to reschedule for next Monday. glasses

## 2020-10-05 PROBLEM — D48.9 NEOPLASM OF UNCERTAIN BEHAVIOR: Status: ACTIVE | Noted: 2020-01-01

## 2020-10-05 NOTE — PLAN OF CARE
Problem: Wound:  Goal: Will show signs of wound healing; wound closure and no evidence of infection  Description: Will show signs of wound healing; wound closure and no evidence of infection  Outcome: Ongoing     Problem: Falls - Risk of:  Goal: Will remain free from falls  Description: Will remain free from falls  Outcome: Ongoing     Problem: Pain:  Goal: Pain level will decrease  Description: Pain level will decrease  Outcome: Ongoing  Goal: Control of acute pain  Description: Control of acute pain  Outcome: Ongoing  Goal: Control of chronic pain  Description: Control of chronic pain  Outcome: Ongoing

## 2020-10-05 NOTE — PROGRESS NOTES
[address]   Progress Note and Procedure Note      [address] RECORD NUMBER:  [id number]  AGE: 60 y.o. GENDER: male  : 1955  EPISODE DATE:  10/5/2020    Subjective:     Chief Complaint   Patient presents with    Wound Check         HISTORY of PRESENT ILLNESS HPI     Lev Fay is a 60 y.o. male who presents today for wound/ulcer evaluation. History of Wound Context: Right lower extremity wound healed without drainage  Wound/Ulcer Pain Timing/Severity: none  Quality of pain: N/A  Severity:  0 / 10   Modifying Factors: None  Associated Signs/Symptoms: none    Ulcer Identification:  Ulcer Type: traumatic  Contributing Factors: decreased mobility and decreased tissue oxygenation    Wound: N/A        PAST MEDICAL HISTORY        Diagnosis Date    Depressive disorder, not elsewhere classified     Impotence of organic origin     Insomnia, unspecified     Myopathy, unspecified     Postinflammatory pulmonary fibrosis (HCC)     Unspecified essential hypertension     Unspecified hypothyroidism        PAST SURGICAL HISTORY    Past Surgical History:   Procedure Laterality Date    LUNG BIOPSY Left     LUNG BIOPSY Left     10 years ago       FAMILY HISTORY    Family History   Problem Relation Age of Onset    High Blood Pressure Mother     Cancer Mother     Stroke Mother     High Blood Pressure Father     Cancer Sister     Heart Disease Other         Family in general       SOCIAL HISTORY    Social History     Tobacco Use    Smoking status: Former Smoker     Packs/day: 0.25     Years: 15.00     Pack years: 3.75     Last attempt to quit: 2005     Years since quitting: 15.6    Smokeless tobacco: Never Used   Substance Use Topics    Alcohol use:  Yes     Alcohol/week: 0.0 standard drinks     Comment: every weekend- 6 Beers/[place]    Drug use: No       ALLERGIES    No Known Allergies    MEDICATIONS    Current Outpatient Medications on File Prior to Encounter   Medication Sig Dispense Refill    predniSONE (DELTASONE) 20 MG tablet Take 20 mg by mouth daily      furosemide (LASIX) 20 MG tablet Take 1 tablet by mouth daily as needed (swelling) 90 tablet 3    ibuprofen (ADVIL;MOTRIN) 800 MG tablet Take 1 tablet by mouth 2 times daily 180 tablet 3    levothyroxine (SYNTHROID) 25 MCG tablet Take 1 tablet by mouth Daily 90 tablet 3    albuterol (PROVENTIL) (2.5 MG/3ML) 0.083% nebulizer solution Take 3 mLs by nebulization every 6 hours as needed for Wheezing or Shortness of Breath 360 each 3    dilTIAZem (CARDIZEM CD) 120 MG extended release capsule Take 1 capsule by mouth daily 30 capsule 3    Omeprazole-Sodium Bicarbonate  MG PACK Take 20 mg by mouth 2 times daily 30 each 3    lisinopril (PRINIVIL;ZESTRIL) 20 MG tablet Take 1 tablet by mouth daily 30 tablet 3    zolpidem (AMBIEN CR) 12.5 MG extended release tablet Take 12.5 mg by mouth nightly as needed for Sleep.  PROAIR  (90 BASE) MCG/ACT inhaler Inhale 2 puffs into the lungs every 6 hours as needed for Wheezing       sodium chloride (ALTAMIST SPRAY) 0.65 % nasal spray 1 spray by Nasal route as needed for Congestion. 1 Bottle 3    DULoxetine (CYMBALTA) 30 MG capsule Take 30 mg by mouth daily   0     No current facility-administered medications on file prior to encounter. REVIEW OF SYSTEMS    Pertinent items are noted in HPI. Objective:      /70   Pulse 80   Temp 96.3 °F (35.7 °C) (Oral)   Resp 20   Ht 6' 8\" (2.032 m)   Wt 262 lb (118.8 kg)   BMI 28.78 kg/m²     Wt Readings from Last 3 Encounters:   10/05/20 262 lb (118.8 kg)   09/10/20 262 lb (118.8 kg)   09/03/20 262 lb (118.8 kg)       PHYSICAL EXAM    Skin: warm and dry, no rash or erythema. Patient has a lesion on his forehead left side. It has been present for several months. Patient continues to remove it with constant recurrence. He may be consistent with basal cell.               Assessment: Problem List Items Addressed This Visit     Current chronic use of systemic steroids    Dog scratch    Leg wound, right, sequela - Primary           Wound 08/03/20 Pretibial Right; Anterior #1 right lower medial pretib? s/p dog scratch (Active)   Wound Image   10/05/20 1319   Wound Traumatic 09/10/20 1501   Dressing Status Clean; Old drainage 09/10/20 1501   Dressing Changed Changed/New 09/10/20 1501   Wound Cleansed Rinsed/Irrigated with saline 09/10/20 1501   Wound Length (cm) 0 cm 10/05/20 1319   Wound Width (cm) 0 cm 10/05/20 1319   Wound Depth (cm) 0 cm 10/05/20 1319   Wound Surface Area (cm^2) 0 cm^2 10/05/20 1319   Change in Wound Size % (l*w) 100 10/05/20 1319   Wound Volume (cm^3) 0 cm^3 10/05/20 1319   Post-Procedure Length (cm) 0 cm 10/05/20 1319   Post-Procedure Width (cm) 0 cm 10/05/20 1319   Post-Procedure Depth (cm) 0 cm 10/05/20 1319   Post-Procedure Surface Area (cm^2) 0 cm^2 10/05/20 1319   Post-Procedure Volume (cm^3) 0 cm^3 10/05/20 1319   Wound Assessment Clean;Drainage;Granulation tissue;Painful;Red;Yellow 09/10/20 1501   Drainage Amount Moderate 09/10/20 1501   Drainage Description Serosanguinous 09/10/20 1501   Odor None 09/10/20 1501   Margins Attached edges 09/10/20 1501   Ruth-wound Assessment Edema; Red 09/10/20 1501   Red%Wound Bed 50 09/10/20 1501   Yellow%Wound Bed 50 09/10/20 1501   Number of days: 63          Wound is healed    Plan:     Treatment Note please see attached Discharge Instructions  Plan:  Patient with a neoplasm of uncertain behavior left forehead. We will plan to excise the lesion. The risks were discussed with patient in detail. All questions were answered. The following information was discussed with the patient, but this is not an all-inclusive-other issue were also discussed with patient. GENERAL INFORMATION  The surgical removal of skin lesions and tumors is frequently performed by plastic surgeons.   Certain skin lesions and skin tumors will not disappear spontaneously, surgical removal is a treatment option. There are many different techniques for removing skin lesions and skin tumors. ALTERNATIVE TREATMENTS  Alternative forms of management consist of not treating the skin lesion/skin tumor condition. Removal of skin lesions and some superficial skin tumors may be accomplished by other treatments including the use of liquid nitrogen (freezing), lasers, topical medications, and electric cautery. Risks and potential complications are associated with alternative forms of treatment. Deeper tumors cannot be treated by this. RISKS OF SKIN LESION/SKIN TUMOR SURGERY    I have explained to the patient that every surgical procedure involves a certain amount of risk and it is important that an understanding of these risks and the possible complications associated with them is understood. In addition, every procedure has limitations. An individual's choice to undergo a surgical procedure is based on the comparison of the risk to potential benefit. Although some of patients do not experience these complications. Bleeding- It is possible, to experience a bleeding episode during or after surgery. Intraoperative blood transfusions may be required. Should post-operative bleeding occur, it may require an emergency treatment to drain the accumulated blood or blood transfusion. Do not take any aspirin or anti-inflammatory medications for ten days before or after surgery, as this may increase the risk of bleeding. Non-prescription herbs and dietary supplements can increase the risk of surgical bleeding. Hematoma can occur at any time following injury. If blood transfusions are necessary to treat blood loss, there is the risk of blood-related infections such as hepatitis and HIV (AIDS). Heparin medications that are used to prevent blood clots in veins can produce bleeding and decreased blood platelets.   Please check with the physician who prescribed that blood surgical site can appear either lighter or darker than surrounding skin. Although uncommon, swelling and skin discoloration may persist for long periods of time and, in rare situations, may be permanent. Skin Sensitivity- Itching, tenderness, or exaggerated responses to hot or cold temperatures may occur after surgery. Usually this resolves during healing, but in some situations, it may be chronic, permanent. Pain- You will experience pain after your surgery. Pain of varying intensity and duration may occur and persist after surgery. Chronic pain may occur from nerves becoming trapped in scar tissue. Sutures- Some surgical techniques use deep sutures. You may notice these sutures after your surgery. Sutures may spontaneously poke through the skin, become visible or produce irritation that requires removal.  Delayed Healing- Wound disruption or delayed wound healing is possible. Some areas of the skin may not heal normally and may take a long time to heal.  Some areas of skin may die, requiring frequent dressing changes or further surgery to remove the non-healed tissue. Smokers have a greater risk of skin loss and wound healing complications. Skin Contour Irregularities- Contour irregularities and depressions may occur after surgery. Visible and palpable wrinkling of skin can occur. Residual skin irregularities at the ends of the incisions or dog ears are always a possibility and may require additional surgery. This may improve with time, or it can be surgically corrected. Allergic Reactions- In some cases, local allergies to tape, suture materials and glues, blood products, topical preparations or injected agents have been reported. Serious systemic reactions including shock (anaphylaxis) may occur to drugs used during surgery and prescription medications. Allergic reactions may require additional treatment. Surgical Anesthesia- Both local and general anesthesia involve risk.   There is the possibility of complications, injury, and even death from all forms of surgical anesthesia or sedation. Change in Skin Sensation- It is common to experience diminished (or loss) of skin sensation in areas that have had surgery. Diminished (or complete loss of skin sensation) may not totally resolve. Shock- In rare circumstances, your surgical procedure can cause severe trauma, particularly when multiple or extensive procedures are performed. Although serious complications are infrequent, infections or excessive fluid loss can lead to severe illness and even death. If surgical shock occurs, hospitalization and additional treatment would be necessary. Unsatisfactory Result- There is no guarantee or warranty expressed or implied, on the results that may be obtained. There is the possibility of a poor result from the removal of skin lesions and tumors. This would include risks such as unacceptable visible deformities, loss of function, poor healing, wound disruption, skin death and loss of sensation. You may be disappointed with the results of reconstructive surgery. It may be necessary to perform additional surgery to improve your results. Cardiac and Pulmonary Complications- Surgery, especially longer procedures, may be associated with the formation of, or increase in, blood clots in the venous system. Pulmonary complications may occur secondarily to both blood clots (pulmonary emboli), fat deposits (fat emboli) or partial collapse of the lungs after general anesthesia. Pulmonary and fat emboli can be life-threatening or fatal in some circumstances. Air travel, inactivity and other conditions may increase the incidence of blood clots traveling to the lungs causing a major blood clot that may result in death. It is important to discuss with your physician any past history of blood clots or swollen legs that may contribute to this condition.   Cardiac complications are a risk with any surgery and anesthesia, even in patients without symptoms. If you experience shortness of breath, chest pains, or unusual heart beats, seek medical attention immediately. Should any of these complications occur, you may require hospitalization and additional treatment. Smoking, Second-Hand Smoke Exposure, Nicotine Products (Patch, Gum, Nasal Spray)-   Patients who are currently smoking, use tobacco products, or nicotine products (patch, gum, or nasal spray) are at a greater risk for significant surgical complications of skin dying, delayed healing, and additional scarring. Individuals exposed to second-hand smoke are also at potential risk for similar complications attributable to nicotine exposure. Additionally, smoking may have a significant negative effect on anesthesia and recovery from anesthesia, with coughing and possibly increased bleeding. Individuals who are not exposed to tobacco smoke or nicotine-containing products have a significantly lower risk of this type of complication. It is important to refrain from smoking at least 8-week weeks before and after surgery. This may apply to secondhand smoking. Mental Health Disorders and Surgery- It is important that all patients seeking to undergo surgery have realistic expectations that focus on improvement rather than perfection. Complications or less than satisfactory results are sometimes unavoidable, may require additional surgery and often are stressful. Please openly discuss with your surgeon, prior to surgery, any history that you may have of significant emotional depression or mental health disorders. Although many individuals may benefit psychologically from the results of surgery, effects on mental health cannot be accurately predicted. Medications- There are many adverse reactions that occur as the result of taking over the counter, herbal, and/or prescription medications.   Be sure to check with your physician about any drug interactions that may exist with medications which you are already taking. If you have an adverse reaction, stop the drugs immediately and call your plastic surgeon for further instructions. If the reaction is severe, go immediately to the nearest emergency room. When taking the prescribed pain medications after surgery, realize that they can affect your thought process and coordination. Do not drive, do not operate complex equipment, do not make any important decisions, and do not drink any alcohol while taking these medications. Be sure to take your prescribed medication only as directed. ADDITIONAL SURGERY NECESSARY  Should complications occur, additional surgery or other treatments may be necessary. Even though risks and complications occur infrequently, the risks cited are the ones that are particularly associated with skin lesion and skin tumor removal.  Other complications and risks can occur but are even more uncommon. The practice of medicine and surgery is not an exact science. Although good results are expected, there is no guarantee or warranty expressed or implied, on the results that may be obtained. PATIENT COMPLIANCE   Follow all physician instructions carefully; this is essential for the success of your outcome. It is important that the surgical incisions are not subjected to excessive force, swelling, abrasion, or motion during the time of healing. Protective dressings and drains should not be removed unless instructed by your plastic surgeon. Successful post-operative function depends on both surgery and subsequent care. Physical activity that increases your pulse or heart rate may cause bruising, swelling, fluid accumulation and the need for return to surgery. It is wise to refrain from intimate physical activities after surgery until your physician states it is safe. It is important that you participate in follow-up care, return for aftercare, and promote your recovery after surgery. Written patient dismissal instructions given to patient and signed by patient or POA. Discharge Instructions         [address]                                 Visit Radha Instructions / Physician Orders  DATE: 10/5/2020     Home Care: [address]      SUPPLIES ORDERED THRU: [employer] (ordered 09/03/2020)     Wound Location:  Right lower leg     Cleanse with: Liquid antibacterial soap and water, rinse well      Dressing Orders: none      Frequency:  Daily     Additional Orders: Increase protein to diet (meat, cheese, eggs, fish, peanut butter, nuts and beans)  Multivitamin daily     MY CHART []????     Smart Device  []????      HYPERBARIC TREATMENT-                TREATMENT #     Your next appointment with the [address] [place] [address] as needed.                                                                                                   ROOM TYPE   []???? CHAIR     []???? BED   []???? EITHER        TIME []???? 45 MIN     []???? 60 MIN     (Please note your next appointment above and if you are unable to keep, kindly give a 24 hour notice. Thank you.)     If you experience any of the following, please call the [address] [place] [address] during business hours:  [phone]     * Increase in Pain  * Temperature over 101  * Increase in drainage from your wound  * Drainage with a foul odor  * Bleeding  * Increase in swelling  * Need for compression bandage changes due to slippage, breakthrough drainage.     If you need medical attention outside of the business hours of the [address] [place] [address] please contact your PCP or go to the nearest emergency room.     The information contained in the After Visit Summary has been reviewed with me, the patient and/or responsible adult, by my health care provider(s). I had the opportunity to ask questions regarding this information. I have elected to receive;      []?? ?? After Visit Summary  [x]????Comprehensive Discharge Instruction        Patient signature______________________________________Date:________  Electronically signed by Virgil Clark RN on 10/5/2020 at 1:32 PM     Electronically signed by Lukas Goode MD on 10/5/2020 at 1:52 PM          Electronically signed by Lukas Goode MD on 10/5/2020 at 1:59 PM

## 2020-10-12 NOTE — PROGRESS NOTES
Subjective:      Patient ID: Tacos Truong is a 60 y.o. male. Visit Information    Have you changed or started any medications since your last visit including any over-the-counter medicines, vitamins, or herbal medicines? no   Are you having any side effects from any of your medications? -  no  Have you stopped taking any of your medications? Is so, why? -  no    Have you seen any other physician or provider since your last visit? Yes - Records Obtained  Have you had any other diagnostic tests since your last visit? Yes - Records Obtained  Have you been seen in the emergency room and/or had an admission to a hospital since we last saw you? Yes - Records Obtained  Have you had your routine dental cleaning in the past 6 months? no    Have you activated your [employer] account? If not, what are your barriers? Yes     Patient Care Team:  Trevon Hernandez PA-C as PCP - General (Physician Assistant)  Trevon Hernandez PA-C as PCP - [facility] Provider    Medical History Review  Past Medical, Family, and Social History reviewed and does contribute to the patient presenting condition    Health Maintenance   Topic Date Due    Hepatitis C screen  1955    HIV screen  07/05/1971    Shingles Vaccine (1 of 2) 07/05/2006    Pneumococcal 0-64 years Vaccine (1 of 1 - PPSV23) 01/19/2015    Flu vaccine (1) 09/01/2020    A1C test (Diabetic or Prediabetic)  10/31/2020    Colon cancer screen colonoscopy  02/19/2021    TSH testing  08/03/2021    Potassium monitoring  08/11/2021    Creatinine monitoring  08/11/2021    Lipid screen  10/31/2024    DTaP/Tdap/Td vaccine (3 - Td) 10/23/2029    Hepatitis A vaccine  Aged Out    Hepatitis B vaccine  Aged Out    Hib vaccine  Aged Out    Meningococcal (ACWY) vaccine  Aged Out     Chief Complaint   Patient presents with    Follow-up     Pt is here following up on wound. Pt states wound is healed and he has skin cancer on scalp. Pt denies any other concerns at this time. HPI patient is here for the management of medical problem. Patient has pulmonary fibrosis, on Portable  Oxygen 4 liters ,Chronically on steroids ,  Needs to go up to 8 liters when he walks . , hypertension. , Hypothyroidism , Umbilical Hernia ,   He quits smoking  18 years ago . He has chronic Ulcer in his right lower leg , which is healed  Has Smal lesion on Forehead , Was told that he may Have Basal Cell Cancer , has Appointment with Dr Jluis Agustin coming soon   Fells very Anxious with New diagnosis, on Ambien and Cymbalta        Review of Systems   Constitutional: Negative for activity change, appetite change, chills and diaphoresis. HENT: Negative for congestion, dental problem, ear discharge, facial swelling and hearing loss. Respiratory: Positive for cough and shortness of breath. Negative for apnea, chest tightness and wheezing. Cardiovascular: Negative for chest pain and leg swelling. Gastrointestinal: Negative for abdominal distention, abdominal pain, constipation and diarrhea. Genitourinary: Negative for difficulty urinating, dysuria, enuresis, flank pain and frequency. Musculoskeletal: Positive for back pain. Negative for arthralgias, gait problem and joint swelling. Skin: Negative for color change, pallor and rash. Lesion on Forehead    Neurological: Negative for dizziness, seizures, facial asymmetry, light-headedness, numbness and headaches. Psychiatric/Behavioral: Positive for sleep disturbance. Negative for agitation, behavioral problems, confusion, decreased concentration and dysphoric mood. The patient is nervous/anxious. Objective:   Physical Exam  Vitals signs and nursing note reviewed. Constitutional:       General: He is not in acute distress. Appearance: He is well-developed. He is not diaphoretic. HENT:      Head: Normocephalic and atraumatic. Mouth/Throat:      Pharynx: No oropharyngeal exudate. Eyes:      General: No scleral icterus. Right eye: No discharge. Left eye: No discharge. Conjunctiva/sclera: Conjunctivae normal.      Pupils: Pupils are equal, round, and reactive to light. Neck:      Musculoskeletal: Normal range of motion and neck supple. Thyroid: No thyromegaly. Vascular: No JVD. Trachea: No tracheal deviation. Cardiovascular:      Rate and Rhythm: Normal rate. Heart sounds: Normal heart sounds. No murmur. No gallop. Pulmonary:      Effort: Pulmonary effort is normal. No respiratory distress. Breath sounds: No stridor. Rales (both lungs ) present. No wheezing. Comments: On oxygen   Abdominal:      General: Bowel sounds are normal. There is no distension. Palpations: Abdomen is soft. Tenderness: There is no abdominal tenderness. There is no guarding or rebound. Comments: umbulical Hernia    Musculoskeletal: Normal range of motion. General: No tenderness. Skin:     General: Skin is warm and dry. Findings: No erythema or rash. Comments: 2x2 cm in Forehead , on Left side    Neurological:      Mental Status: He is alert and oriented to person, place, and time. Assessment / Plan:   1. Follow up      2. Supraventricular tachycardia (HCC)  Controlled    3. Atrial fibrillation, unspecified type ([address]  Controlled, advised to follow with cardiologist    4. Pulmonary fibrosis ([address]  On oxygen, follows with pulmonologist    5. Healthcare maintenance    6. Gastroesophageal reflux disease with esophagitis, unspecified whether hemorrhage    - pantoprazole (PROTONIX) 40 MG tablet; Take 1 tablet by mouth every morning (before breakfast)  Dispense: 30 tablet; Refill: 5    7. GLADYS (generalized anxiety disorder)    - LORazepam (ATIVAN) 0.5 MG tablet; Take 1 tablet by mouth 3 times daily as needed for Anxiety for up to 5 days. Dispense: 15 tablet; Refill: 0    8.  Need for pneumococcal vaccine    - PNEUMOVAX 23 subcutaneous/IM (Pneumococcal polysaccharide vaccine

## 2020-10-21 NOTE — PROGRESS NOTES
[address] RECORD NUMBER:  [id number]  AGE: 60 y.o. GENDER: male  : 1955  EPISODE DATE:  10/21/2020    Subjective:     Chief Complaint   Patient presents with    New Patient     skin lesion on arm, back and forehead. No hx of skin cancer         HISTORY of PRESENT ILLNESS MORRIS Barajas is a 60 y.o. male who presents today for wound/ulcer evaluation. History of Wound Context: Right lower extremity wound healed without drainage  Wound/Ulcer Pain Timing/Severity: none  Quality of pain: N/A  Severity:  0 / 10   Modifying Factors: None  Associated Signs/Symptoms: none  Patient also has multiple lipomas that cause him pain and discomfort specially in the upper arm and the left abdomen. He wishes to have these addressed at the same time. Wound: N/A        PAST MEDICAL HISTORY        Diagnosis Date    Depressive disorder, not elsewhere classified     Impotence of organic origin     Insomnia, unspecified     Myopathy, unspecified     Postinflammatory pulmonary fibrosis (HCC)     Unspecified essential hypertension     Unspecified hypothyroidism        PAST SURGICAL HISTORY    Past Surgical History:   Procedure Laterality Date    LUNG BIOPSY Left     LUNG BIOPSY Left     10 years ago       FAMILY HISTORY    Family History   Problem Relation Age of Onset    High Blood Pressure Mother     Cancer Mother     Stroke Mother     High Blood Pressure Father     Cancer Sister     Heart Disease Other         Family in general       SOCIAL HISTORY    Social History     Tobacco Use    Smoking status: Former Smoker     Packs/day: 0.25     Years: 15.00     Pack years: 3.75     Last attempt to quit: 2005     Years since quitting: 15.6    Smokeless tobacco: Never Used   Substance Use Topics    Alcohol use:  Yes     Alcohol/week: 0.0 standard drinks     Comment: every weekend- 6 Beers/[place]in    Drug use: No       ALLERGIES    No Known Allergies    MEDICATIONS    Current Outpatient Medications on File Prior to Visit   Medication Sig Dispense Refill    pantoprazole (PROTONIX) 40 MG tablet Take 1 tablet by mouth every morning (before breakfast) 30 tablet 5    predniSONE (DELTASONE) 20 MG tablet Take 20 mg by mouth daily      furosemide (LASIX) 20 MG tablet Take 1 tablet by mouth daily as needed (swelling) 90 tablet 3    ibuprofen (ADVIL;MOTRIN) 800 MG tablet Take 1 tablet by mouth 2 times daily 180 tablet 3    levothyroxine (SYNTHROID) 25 MCG tablet Take 1 tablet by mouth Daily 90 tablet 3    albuterol (PROVENTIL) (2.5 MG/3ML) 0.083% nebulizer solution Take 3 mLs by nebulization every 6 hours as needed for Wheezing or Shortness of Breath 360 each 3    dilTIAZem (CARDIZEM CD) 120 MG extended release capsule Take 1 capsule by mouth daily 30 capsule 3    lisinopril (PRINIVIL;ZESTRIL) 20 MG tablet Take 1 tablet by mouth daily 30 tablet 3    zolpidem (AMBIEN CR) 12.5 MG extended release tablet Take 12.5 mg by mouth nightly as needed for Sleep.  PROAIR  (90 BASE) MCG/ACT inhaler Inhale 2 puffs into the lungs every 6 hours as needed for Wheezing       sodium chloride (ALTAMIST SPRAY) 0.65 % nasal spray 1 spray by Nasal route as needed for Congestion. 1 Bottle 3    DULoxetine (CYMBALTA) 30 MG capsule Take 30 mg by mouth daily   0     No current facility-administered medications on file prior to visit. REVIEW OF SYSTEMS    Pertinent items are noted in HPI. Objective:      /67 (Site: Left Upper Arm, Position: Sitting, Cuff Size: Large Adult)   Pulse 85   Temp 98.2 °F (36.8 °C)   Ht 6' 7\" (2.007 m)   Wt 262 lb 6.4 oz (119 kg)   SpO2 (!) 85% Comment: pt on O2, this is normal after walking  BMI 29.56 kg/m²     Wt Readings from Last 3 Encounters:   10/21/20 262 lb 6.4 oz (119 kg)   10/12/20 255 lb (115.7 kg)   10/05/20 262 lb (118.8 kg)       PHYSICAL EXAM  HEENT.   Atraumatic normocephalic  Eyes: Extraocular muscles are grossly intact  Respiratory: Patient is on oxygen  Abdomen: Soft nontender nondistended. Extremities: Moves all extremities. Skin: warm and dry, no rash or erythema. Patient has a lesion on his forehead left side. It has been present for several months. Patient continues to remove it with constant recurrence. He may be consistent with basal cell. Forehead mass with a central crater measures 0.5 x 1 cm malignancy cannot be excluded. Mass left upper arm measures 3 cm x 2 cm probably consistent with a lipoma discussed with the patient pain and discomfort he wishes to have it removed. This lipoma causes patient pain and discomfort    Mass right upper arm measures 3 cm x 2 cm    Mass left upper abdomen measures 3 cm x 1 cm    Mass left upper abdomen probably consistent with a lipoma this causes the patient pain and discomfort. Lesion central back measures 0.5 x 0.5 cm we will observe this probably consistent with a nevus. Lesion right back measures 1 cm x 1 cm probably consistent with nevus          Plan:     Patient with a lesion on the left forehead and right lower back. Patient also has multiple lipomas in the following area: Left upper arm left lower arm, right upper arm right lower arm, left upper abdomen. Patient wishes to have these excised. The risks were discussed with patient in detail. All questions were answered. The following information was discussed with the patient, but this is not an all-inclusive-other issue were also discussed with patient. GENERAL INFORMATION  The surgical removal of skin lesions and tumors is frequently performed by plastic surgeons. Certain skin lesions and skin tumors will not disappear spontaneously, surgical removal is a treatment option. There are many different techniques for removing skin lesions and skin tumors.   ALTERNATIVE TREATMENTS  Alternative forms of management consist of not treating the skin lesion/skin tumor necessary. Scarring- All surgery leaves scars, some more visible than others. Although wound healing after a surgical procedure is expected, abnormal scars may occur within the skin and deeper tissues. Scars may be unattractive and of different color than the surrounding skin tone. Scar appearance may also vary within the same scar. There is the possibility of visible marks from sutures used to close the wound after the removal of skin lesions and tumors. There is the possibility that scars may limit motion and function. In some cases, scars may require surgical revision or treatment. Obesity: If you're BMI is greater than 30 you may have a higher chance of complications. This may include but not limited to wound healing and infections. Also, if you have other medical problems such as diabetes and hypertension it may affect your healing as well as your surgical results in bleeding. Damage to Deeper Structures- There is the potential for injury to deeper structures including nerves, blood vessels, muscles, and lungs (pneumothorax) during any surgical procedure. The potential for this to occur varies according to where on the body surgery is being performed. Injury to deeper structures may be temporary or permanent. Cancer- In some situations, a skin lesion or tumor that appears to be benign may be determined to be cancerous after laboratory analysis. Additional treatments or surgery may be necessary. Recurrence- In some situation, skin lesions and tumors can recur after surgical excision. Additional treatment or secondary surgery may be necessary. Skin Discoloration / Swelling- Some bruising and swelling normally occurs following surgery. The skin in or near the surgical site can appear either lighter or darker than surrounding skin. Although uncommon, swelling and skin discoloration may persist for long periods of time and, in rare situations, may be permanent.     Skin Sensitivity- Itching, tenderness, or exaggerated responses to hot or cold temperatures may occur after surgery. Usually this resolves during healing, but in some situations, it may be chronic, permanent. Pain- You will experience pain after your surgery. Pain of varying intensity and duration may occur and persist after surgery. Chronic pain may occur from nerves becoming trapped in scar tissue. Sutures- Some surgical techniques use deep sutures. You may notice these sutures after your surgery. Sutures may spontaneously poke through the skin, become visible or produce irritation that requires removal.  Delayed Healing- Wound disruption or delayed wound healing is possible. Some areas of the skin may not heal normally and may take a long time to heal.  Some areas of skin may die, requiring frequent dressing changes or further surgery to remove the non-healed tissue. Smokers have a greater risk of skin loss and wound healing complications. Skin Contour Irregularities- Contour irregularities and depressions may occur after surgery. Visible and palpable wrinkling of skin can occur. Residual skin irregularities at the ends of the incisions or dog ears are always a possibility and may require additional surgery. This may improve with time, or it can be surgically corrected. Allergic Reactions- In some cases, local allergies to tape, suture materials and glues, blood products, topical preparations or injected agents have been reported. Serious systemic reactions including shock (anaphylaxis) may occur to drugs used during surgery and prescription medications. Allergic reactions may require additional treatment. Surgical Anesthesia- Both local and general anesthesia involve risk. There is the possibility of complications, injury, and even death from all forms of surgical anesthesia or sedation. Change in Skin Sensation- It is common to experience diminished (or loss) of skin sensation in areas that have had surgery. Diminished (or complete loss of skin sensation) may not totally resolve. Shock- In rare circumstances, your surgical procedure can cause severe trauma, particularly when multiple or extensive procedures are performed. Although serious complications are infrequent, infections or excessive fluid loss can lead to severe illness and even death. If surgical shock occurs, hospitalization and additional treatment would be necessary. Unsatisfactory Result- There is no guarantee or warranty expressed or implied, on the results that may be obtained. There is the possibility of a poor result from the removal of skin lesions and tumors. This would include risks such as unacceptable visible deformities, loss of function, poor healing, wound disruption, skin death and loss of sensation. You may be disappointed with the results of reconstructive surgery. It may be necessary to perform additional surgery to improve your results. Cardiac and Pulmonary Complications- Surgery, especially longer procedures, may be associated with the formation of, or increase in, blood clots in the venous system. Pulmonary complications may occur secondarily to both blood clots (pulmonary emboli), fat deposits (fat emboli) or partial collapse of the lungs after general anesthesia. Pulmonary and fat emboli can be life-threatening or fatal in some circumstances. Air travel, inactivity and other conditions may increase the incidence of blood clots traveling to the lungs causing a major blood clot that may result in death. It is important to discuss with your physician any past history of blood clots or swollen legs that may contribute to this condition. Cardiac complications are a risk with any surgery and anesthesia, even in patients without symptoms. If you experience shortness of breath, chest pains, or unusual heart beats, seek medical attention immediately.   Should any of these complications occur, you may require hospitalization and emergency room. When taking the prescribed pain medications after surgery, realize that they can affect your thought process and coordination. Do not drive, do not operate complex equipment, do not make any important decisions, and do not drink any alcohol while taking these medications. Be sure to take your prescribed medication only as directed. ADDITIONAL SURGERY NECESSARY  Should complications occur, additional surgery or other treatments may be necessary. Even though risks and complications occur infrequently, the risks cited are the ones that are particularly associated with skin lesion and skin tumor removal.  Other complications and risks can occur but are even more uncommon. The practice of medicine and surgery is not an exact science. Although good results are expected, there is no guarantee or warranty expressed or implied, on the results that may be obtained. PATIENT COMPLIANCE   Follow all physician instructions carefully; this is essential for the success of your outcome. It is important that the surgical incisions are not subjected to excessive force, swelling, abrasion, or motion during the time of healing. Protective dressings and drains should not be removed unless instructed by your plastic surgeon. Successful post-operative function depends on both surgery and subsequent care. Physical activity that increases your pulse or heart rate may cause bruising, swelling, fluid accumulation and the need for return to surgery. It is wise to refrain from intimate physical activities after surgery until your physician states it is safe. It is important that you participate in follow-up care, return for aftercare, and promote your recovery after surgery.                       Electronically signed by Khoa Mcallister MD on 10/21/2020 at 3:42 PM

## 2021-01-01 ENCOUNTER — APPOINTMENT (OUTPATIENT)
Dept: GENERAL RADIOLOGY | Age: 66
End: 2021-01-01
Payer: COMMERCIAL

## 2021-01-01 ENCOUNTER — HOSPITAL ENCOUNTER (INPATIENT)
Age: 66
LOS: 4 days | Discharge: HOME | DRG: 191 | End: 2021-06-06
Attending: EMERGENCY MEDICINE | Admitting: INTERNAL MEDICINE
Payer: COMMERCIAL

## 2021-01-01 ENCOUNTER — APPOINTMENT (OUTPATIENT)
Dept: GENERAL RADIOLOGY | Age: 66
DRG: 191 | End: 2021-01-01
Payer: COMMERCIAL

## 2021-01-01 ENCOUNTER — TELEPHONE (OUTPATIENT)
Dept: INTERNAL MEDICINE CLINIC | Age: 66
End: 2021-01-01

## 2021-01-01 ENCOUNTER — HOSPITAL ENCOUNTER (EMERGENCY)
Age: 66
End: 2021-07-08
Attending: EMERGENCY MEDICINE | Admitting: INTERNAL MEDICINE
Payer: COMMERCIAL

## 2021-01-01 ENCOUNTER — OFFICE VISIT (OUTPATIENT)
Dept: INTERNAL MEDICINE CLINIC | Age: 66
End: 2021-01-01
Payer: COMMERCIAL

## 2021-01-01 VITALS
HEART RATE: 88 BPM | BODY MASS INDEX: 29.39 KG/M2 | WEIGHT: 254 LBS | SYSTOLIC BLOOD PRESSURE: 100 MMHG | TEMPERATURE: 97.7 F | RESPIRATION RATE: 23 BRPM | DIASTOLIC BLOOD PRESSURE: 75 MMHG | HEIGHT: 78 IN | OXYGEN SATURATION: 82 %

## 2021-01-01 VITALS
SYSTOLIC BLOOD PRESSURE: 92 MMHG | WEIGHT: 260 LBS | HEIGHT: 78 IN | BODY MASS INDEX: 30.08 KG/M2 | DIASTOLIC BLOOD PRESSURE: 60 MMHG | TEMPERATURE: 97.8 F

## 2021-01-01 VITALS
WEIGHT: 254.41 LBS | TEMPERATURE: 97.2 F | HEIGHT: 78 IN | BODY MASS INDEX: 29.44 KG/M2 | HEART RATE: 63 BPM | RESPIRATION RATE: 18 BRPM | OXYGEN SATURATION: 91 % | SYSTOLIC BLOOD PRESSURE: 108 MMHG | DIASTOLIC BLOOD PRESSURE: 74 MMHG

## 2021-01-01 DIAGNOSIS — J18.9 PNEUMONIA DUE TO ORGANISM: ICD-10-CM

## 2021-01-01 DIAGNOSIS — I10 ESSENTIAL HYPERTENSION: ICD-10-CM

## 2021-01-01 DIAGNOSIS — I48.91 ATRIAL FIBRILLATION WITH RVR (HCC): Primary | ICD-10-CM

## 2021-01-01 DIAGNOSIS — G89.29 CHRONIC LOW BACK PAIN, UNSPECIFIED BACK PAIN LATERALITY, UNSPECIFIED WHETHER SCIATICA PRESENT: ICD-10-CM

## 2021-01-01 DIAGNOSIS — J84.10 PULMONARY FIBROSIS (HCC): ICD-10-CM

## 2021-01-01 DIAGNOSIS — J64 PNEUMOCONIOSIS (HCC): ICD-10-CM

## 2021-01-01 DIAGNOSIS — I48.91 ATRIAL FIBRILLATION, UNSPECIFIED TYPE (HCC): Primary | ICD-10-CM

## 2021-01-01 DIAGNOSIS — I48.92 ATRIAL FLUTTER WITH RAPID VENTRICULAR RESPONSE (HCC): Primary | ICD-10-CM

## 2021-01-01 DIAGNOSIS — S81.802D WOUND OF LEFT LOWER EXTREMITY, SUBSEQUENT ENCOUNTER: ICD-10-CM

## 2021-01-01 DIAGNOSIS — M54.50 CHRONIC LOW BACK PAIN, UNSPECIFIED BACK PAIN LATERALITY, UNSPECIFIED WHETHER SCIATICA PRESENT: ICD-10-CM

## 2021-01-01 DIAGNOSIS — Z00.00 HEALTHCARE MAINTENANCE: ICD-10-CM

## 2021-01-01 DIAGNOSIS — K21.00 GASTROESOPHAGEAL REFLUX DISEASE WITH ESOPHAGITIS, UNSPECIFIED WHETHER HEMORRHAGE: ICD-10-CM

## 2021-01-01 LAB
ABSOLUTE BANDS #: 0.07 K/UL (ref 0–1)
ABSOLUTE BANDS #: 0.33 K/UL (ref 0–1)
ABSOLUTE BANDS #: 0.82 K/UL (ref 0–1)
ABSOLUTE EOS #: 0 K/UL (ref 0–0.4)
ABSOLUTE EOS #: 0.08 K/UL (ref 0–0.4)
ABSOLUTE EOS #: 0.1 K/UL (ref 0–0.4)
ABSOLUTE EOS #: 0.2 K/UL (ref 0–0.4)
ABSOLUTE IMMATURE GRANULOCYTE: ABNORMAL K/UL (ref 0–0.3)
ABSOLUTE LYMPH #: 0.21 K/UL (ref 1–4.8)
ABSOLUTE LYMPH #: 0.28 K/UL (ref 1–4.8)
ABSOLUTE LYMPH #: 0.7 K/UL (ref 1–4.8)
ABSOLUTE LYMPH #: 0.9 K/UL (ref 1–4.8)
ABSOLUTE LYMPH #: 0.9 K/UL (ref 1–4.8)
ABSOLUTE LYMPH #: 1.39 K/UL (ref 1–4.8)
ABSOLUTE MONO #: 0.28 K/UL (ref 0.1–1.3)
ABSOLUTE MONO #: 0.41 K/UL (ref 0.1–1.3)
ABSOLUTE MONO #: 0.74 K/UL (ref 0.1–1.3)
ABSOLUTE MONO #: 0.8 K/UL (ref 0.1–1.3)
ABSOLUTE MONO #: 0.8 K/UL (ref 0.1–1.3)
ABSOLUTE MONO #: 0.9 K/UL (ref 0.1–1.3)
ALBUMIN SERPL-MCNC: 3.1 G/DL (ref 3.5–5.2)
ALBUMIN SERPL-MCNC: 3.3 G/DL (ref 3.5–5.2)
ALBUMIN SERPL-MCNC: 3.7 G/DL (ref 3.5–5.2)
ALBUMIN/GLOBULIN RATIO: ABNORMAL (ref 1–2.5)
ALP BLD-CCNC: 57 U/L (ref 40–129)
ALP BLD-CCNC: 58 U/L (ref 40–129)
ALP BLD-CCNC: 58 U/L (ref 40–129)
ALP BLD-CCNC: 59 U/L (ref 40–129)
ALP BLD-CCNC: 73 U/L (ref 40–129)
ALT SERPL-CCNC: 10 U/L (ref 5–41)
ALT SERPL-CCNC: 10 U/L (ref 5–41)
ALT SERPL-CCNC: 12 U/L (ref 5–41)
ALT SERPL-CCNC: 13 U/L (ref 5–41)
ALT SERPL-CCNC: 21 U/L (ref 5–41)
ANION GAP SERPL CALCULATED.3IONS-SCNC: 4 MMOL/L (ref 9–17)
ANION GAP SERPL CALCULATED.3IONS-SCNC: 5 MMOL/L (ref 9–17)
ANION GAP SERPL CALCULATED.3IONS-SCNC: 5 MMOL/L (ref 9–17)
ANION GAP SERPL CALCULATED.3IONS-SCNC: 6 MMOL/L (ref 9–17)
ANION GAP SERPL CALCULATED.3IONS-SCNC: 8 MMOL/L (ref 9–17)
ANION GAP SERPL CALCULATED.3IONS-SCNC: 9 MMOL/L (ref 9–17)
ANTI-XA UNFRAC HEPARIN: 0.37 IU/L (ref 0.3–0.7)
ANTI-XA UNFRAC HEPARIN: 0.39 IU/L (ref 0.3–0.7)
ANTI-XA UNFRAC HEPARIN: 0.46 IU/L (ref 0.3–0.7)
ANTI-XA UNFRAC HEPARIN: 0.49 IU/L (ref 0.3–0.7)
ANTI-XA UNFRAC HEPARIN: 0.75 IU/L (ref 0.3–0.7)
ANTI-XA UNFRAC HEPARIN: 0.75 IU/L (ref 0.3–0.7)
ANTI-XA UNFRAC HEPARIN: 0.83 IU/L (ref 0.3–0.7)
AST SERPL-CCNC: 13 U/L
AST SERPL-CCNC: 13 U/L
AST SERPL-CCNC: 14 U/L
AST SERPL-CCNC: 18 U/L
AST SERPL-CCNC: 19 U/L
BANDS: 1 % (ref 0–10)
BANDS: 4 % (ref 0–10)
BANDS: 8 % (ref 0–10)
BASOPHILS # BLD: 0 % (ref 0–2)
BASOPHILS # BLD: 1 % (ref 0–2)
BASOPHILS # BLD: 1 % (ref 0–2)
BASOPHILS ABSOLUTE: 0 K/UL (ref 0–0.2)
BASOPHILS ABSOLUTE: 0.07 K/UL (ref 0–0.2)
BILIRUB SERPL-MCNC: 0.19 MG/DL (ref 0.3–1.2)
BILIRUB SERPL-MCNC: 0.19 MG/DL (ref 0.3–1.2)
BILIRUB SERPL-MCNC: 0.21 MG/DL (ref 0.3–1.2)
BILIRUB SERPL-MCNC: 0.29 MG/DL (ref 0.3–1.2)
BILIRUB SERPL-MCNC: 0.5 MG/DL (ref 0.3–1.2)
BNP INTERPRETATION: ABNORMAL
BNP INTERPRETATION: ABNORMAL
BUN BLDV-MCNC: 13 MG/DL (ref 8–23)
BUN BLDV-MCNC: 16 MG/DL (ref 8–23)
BUN BLDV-MCNC: 16 MG/DL (ref 8–23)
BUN BLDV-MCNC: 17 MG/DL (ref 8–23)
BUN BLDV-MCNC: 17 MG/DL (ref 8–23)
BUN BLDV-MCNC: 25 MG/DL (ref 8–23)
BUN/CREAT BLD: ABNORMAL (ref 9–20)
C-REACTIVE PROTEIN: 33 MG/L (ref 0–5)
CALCIUM SERPL-MCNC: 8.4 MG/DL (ref 8.6–10.4)
CALCIUM SERPL-MCNC: 8.7 MG/DL (ref 8.6–10.4)
CALCIUM SERPL-MCNC: 8.9 MG/DL (ref 8.6–10.4)
CALCIUM SERPL-MCNC: 9.3 MG/DL (ref 8.6–10.4)
CHLORIDE BLD-SCNC: 100 MMOL/L (ref 98–107)
CHLORIDE BLD-SCNC: 103 MMOL/L (ref 98–107)
CHLORIDE BLD-SCNC: 103 MMOL/L (ref 98–107)
CHLORIDE BLD-SCNC: 92 MMOL/L (ref 98–107)
CHLORIDE BLD-SCNC: 96 MMOL/L (ref 98–107)
CHLORIDE BLD-SCNC: 99 MMOL/L (ref 98–107)
CO2: 34 MMOL/L (ref 20–31)
CO2: 35 MMOL/L (ref 20–31)
CO2: 35 MMOL/L (ref 20–31)
CO2: 37 MMOL/L (ref 20–31)
CO2: 37 MMOL/L (ref 20–31)
CO2: 39 MMOL/L (ref 20–31)
CREAT SERPL-MCNC: 0.86 MG/DL (ref 0.7–1.2)
CREAT SERPL-MCNC: 0.92 MG/DL (ref 0.7–1.2)
CREAT SERPL-MCNC: 0.92 MG/DL (ref 0.7–1.2)
CREAT SERPL-MCNC: 1.03 MG/DL (ref 0.7–1.2)
CREAT SERPL-MCNC: 1.07 MG/DL (ref 0.7–1.2)
CREAT SERPL-MCNC: 1.1 MG/DL (ref 0.7–1.2)
CULTURE: NORMAL
CULTURE: NORMAL
DIFFERENTIAL TYPE: ABNORMAL
EKG ATRIAL RATE: 278 BPM
EKG P AXIS: 130 DEGREES
EKG Q-T INTERVAL: 328 MS
EKG QRS DURATION: 102 MS
EKG QTC CALCULATION (BAZETT): 499 MS
EKG R AXIS: 106 DEGREES
EKG T AXIS: -86 DEGREES
EKG VENTRICULAR RATE: 139 BPM
EOSINOPHILS RELATIVE PERCENT: 0 % (ref 0–4)
EOSINOPHILS RELATIVE PERCENT: 1 % (ref 0–4)
EOSINOPHILS RELATIVE PERCENT: 1 % (ref 0–4)
EOSINOPHILS RELATIVE PERCENT: 2 % (ref 0–4)
GFR AFRICAN AMERICAN: >60 ML/MIN
GFR NON-AFRICAN AMERICAN: >60 ML/MIN
GFR SERPL CREATININE-BSD FRML MDRD: ABNORMAL ML/MIN/{1.73_M2}
GLUCOSE BLD-MCNC: 100 MG/DL (ref 70–99)
GLUCOSE BLD-MCNC: 103 MG/DL (ref 70–99)
GLUCOSE BLD-MCNC: 123 MG/DL (ref 70–99)
GLUCOSE BLD-MCNC: 129 MG/DL (ref 70–99)
GLUCOSE BLD-MCNC: 132 MG/DL (ref 70–99)
GLUCOSE BLD-MCNC: 99 MG/DL (ref 70–99)
HCT VFR BLD CALC: 38.2 % (ref 41–53)
HCT VFR BLD CALC: 38.8 % (ref 41–53)
HCT VFR BLD CALC: 39 % (ref 41–53)
HCT VFR BLD CALC: 39.8 % (ref 41–53)
HCT VFR BLD CALC: 45 % (ref 41–53)
HCT VFR BLD CALC: 47.3 % (ref 41–53)
HEMOGLOBIN: 12.2 G/DL (ref 13.5–17.5)
HEMOGLOBIN: 12.2 G/DL (ref 13.5–17.5)
HEMOGLOBIN: 12.3 G/DL (ref 13.5–17.5)
HEMOGLOBIN: 12.6 G/DL (ref 13.5–17.5)
HEMOGLOBIN: 14.3 G/DL (ref 13.5–17.5)
HEMOGLOBIN: 15 G/DL (ref 13.5–17.5)
IMMATURE GRANULOCYTES: ABNORMAL %
INR BLD: 1
LACTIC ACID, SEPSIS WHOLE BLOOD: NORMAL MMOL/L (ref 0.5–1.9)
LACTIC ACID, SEPSIS: 1.5 MMOL/L (ref 0.5–1.9)
LACTIC ACID: 2.1 MMOL/L (ref 0.5–2.2)
LACTIC ACID: 3.5 MMOL/L (ref 0.5–2.2)
LYMPHOCYTES # BLD: 11 % (ref 24–44)
LYMPHOCYTES # BLD: 11 % (ref 24–44)
LYMPHOCYTES # BLD: 17 % (ref 24–44)
LYMPHOCYTES # BLD: 2 % (ref 24–44)
LYMPHOCYTES # BLD: 4 % (ref 24–44)
LYMPHOCYTES # BLD: 9 % (ref 24–44)
Lab: NORMAL
Lab: NORMAL
MAGNESIUM: 2.2 MG/DL (ref 1.6–2.6)
MCH RBC QN AUTO: 29.5 PG (ref 26–34)
MCH RBC QN AUTO: 30.1 PG (ref 26–34)
MCH RBC QN AUTO: 30.1 PG (ref 26–34)
MCH RBC QN AUTO: 30.3 PG (ref 26–34)
MCH RBC QN AUTO: 30.3 PG (ref 26–34)
MCH RBC QN AUTO: 32 PG (ref 26–34)
MCHC RBC AUTO-ENTMCNC: 31.4 G/DL (ref 31–37)
MCHC RBC AUTO-ENTMCNC: 31.5 G/DL (ref 31–37)
MCHC RBC AUTO-ENTMCNC: 31.6 G/DL (ref 31–37)
MCHC RBC AUTO-ENTMCNC: 31.7 G/DL (ref 31–37)
MCHC RBC AUTO-ENTMCNC: 31.8 G/DL (ref 31–37)
MCHC RBC AUTO-ENTMCNC: 32.1 G/DL (ref 31–37)
MCV RBC AUTO: 93.5 FL (ref 80–100)
MCV RBC AUTO: 94.8 FL (ref 80–100)
MCV RBC AUTO: 95.5 FL (ref 80–100)
MCV RBC AUTO: 96 FL (ref 80–100)
MCV RBC AUTO: 96 FL (ref 80–100)
MCV RBC AUTO: 99.7 FL (ref 80–100)
MONOCYTES # BLD: 10 % (ref 1–7)
MONOCYTES # BLD: 10 % (ref 1–7)
MONOCYTES # BLD: 11 % (ref 1–7)
MONOCYTES # BLD: 4 % (ref 1–7)
MONOCYTES # BLD: 4 % (ref 1–7)
MONOCYTES # BLD: 9 % (ref 1–7)
MORPHOLOGY: ABNORMAL
MORPHOLOGY: NORMAL
MYCOPLASMA PNEUMONIAE IGM: 0.07
NRBC AUTOMATED: ABNORMAL PER 100 WBC
PDW BLD-RTO: 15.4 % (ref 11.5–14.9)
PDW BLD-RTO: 15.6 % (ref 11.5–14.9)
PDW BLD-RTO: 15.7 % (ref 11.5–14.9)
PDW BLD-RTO: 15.8 % (ref 11.5–14.9)
PDW BLD-RTO: 15.9 % (ref 11.5–14.9)
PDW BLD-RTO: 16.5 % (ref 11.5–14.9)
PLATELET # BLD: 169 K/UL (ref 150–450)
PLATELET # BLD: 187 K/UL (ref 150–450)
PLATELET # BLD: 193 K/UL (ref 150–450)
PLATELET # BLD: 201 K/UL (ref 150–450)
PLATELET # BLD: 209 K/UL (ref 150–450)
PLATELET # BLD: 224 K/UL (ref 150–450)
PLATELET ESTIMATE: ABNORMAL
PMV BLD AUTO: 8.7 FL (ref 6–12)
PMV BLD AUTO: 8.8 FL (ref 6–12)
PMV BLD AUTO: 8.9 FL (ref 6–12)
PMV BLD AUTO: 9 FL (ref 6–12)
PMV BLD AUTO: 9.1 FL (ref 6–12)
PMV BLD AUTO: 9.6 FL (ref 6–12)
POTASSIUM SERPL-SCNC: 4 MMOL/L (ref 3.7–5.3)
POTASSIUM SERPL-SCNC: 4.2 MMOL/L (ref 3.7–5.3)
POTASSIUM SERPL-SCNC: 4.3 MMOL/L (ref 3.7–5.3)
POTASSIUM SERPL-SCNC: 4.6 MMOL/L (ref 3.7–5.3)
POTASSIUM SERPL-SCNC: 4.9 MMOL/L (ref 3.7–5.3)
POTASSIUM SERPL-SCNC: 5 MMOL/L (ref 3.7–5.3)
PRO-BNP: 2964 PG/ML
PRO-BNP: 6171 PG/ML
PROCALCITONIN: 0.08 NG/ML
PROCALCITONIN: 0.1 NG/ML
PROTHROMBIN TIME: 12.9 SEC (ref 11.8–14.6)
RBC # BLD: 3.83 M/UL (ref 4.5–5.9)
RBC # BLD: 4.04 M/UL (ref 4.5–5.9)
RBC # BLD: 4.06 M/UL (ref 4.5–5.9)
RBC # BLD: 4.26 M/UL (ref 4.5–5.9)
RBC # BLD: 4.71 M/UL (ref 4.5–5.9)
RBC # BLD: 4.99 M/UL (ref 4.5–5.9)
RBC # BLD: ABNORMAL 10*6/UL
SARS-COV-2, RAPID: NOT DETECTED
SARS-COV-2, RAPID: NOT DETECTED
SEG NEUTROPHILS: 69 % (ref 36–66)
SEG NEUTROPHILS: 76 % (ref 36–66)
SEG NEUTROPHILS: 77 % (ref 36–66)
SEG NEUTROPHILS: 81 % (ref 36–66)
SEG NEUTROPHILS: 86 % (ref 36–66)
SEG NEUTROPHILS: 90 % (ref 36–66)
SEGMENTED NEUTROPHILS ABSOLUTE COUNT: 5.66 K/UL (ref 1.3–9.1)
SEGMENTED NEUTROPHILS ABSOLUTE COUNT: 6.1 K/UL (ref 1.3–9.1)
SEGMENTED NEUTROPHILS ABSOLUTE COUNT: 6.3 K/UL (ref 1.3–9.1)
SEGMENTED NEUTROPHILS ABSOLUTE COUNT: 6.3 K/UL (ref 1.3–9.1)
SEGMENTED NEUTROPHILS ABSOLUTE COUNT: 6.6 K/UL (ref 1.3–9.1)
SEGMENTED NEUTROPHILS ABSOLUTE COUNT: 8.86 K/UL (ref 1.3–9.1)
SODIUM BLD-SCNC: 137 MMOL/L (ref 135–144)
SODIUM BLD-SCNC: 140 MMOL/L (ref 135–144)
SODIUM BLD-SCNC: 143 MMOL/L (ref 135–144)
SODIUM BLD-SCNC: 147 MMOL/L (ref 135–144)
SPECIMEN DESCRIPTION: NORMAL
TOTAL PROTEIN: 5.6 G/DL (ref 6.4–8.3)
TOTAL PROTEIN: 5.9 G/DL (ref 6.4–8.3)
TOTAL PROTEIN: 5.9 G/DL (ref 6.4–8.3)
TOTAL PROTEIN: 6.1 G/DL (ref 6.4–8.3)
TOTAL PROTEIN: 7.3 G/DL (ref 6.4–8.3)
TROPONIN INTERP: ABNORMAL
TROPONIN T: ABNORMAL NG/ML
TROPONIN, HIGH SENSITIVITY: 30 NG/L (ref 0–22)
TROPONIN, HIGH SENSITIVITY: 33 NG/L (ref 0–22)
TROPONIN, HIGH SENSITIVITY: 38 NG/L (ref 0–22)
TSH SERPL DL<=0.05 MIU/L-ACNC: 0.99 MIU/L (ref 0.3–5)
WBC # BLD: 10.3 K/UL (ref 3.5–11)
WBC # BLD: 7 K/UL (ref 3.5–11)
WBC # BLD: 8 K/UL (ref 3.5–11)
WBC # BLD: 8.1 K/UL (ref 3.5–11)
WBC # BLD: 8.2 K/UL (ref 3.5–11)
WBC # BLD: 8.3 K/UL (ref 3.5–11)
WBC # BLD: ABNORMAL 10*3/UL

## 2021-01-01 PROCEDURE — 84145 PROCALCITONIN (PCT): CPT

## 2021-01-01 PROCEDURE — 99291 CRITICAL CARE FIRST HOUR: CPT

## 2021-01-01 PROCEDURE — 80053 COMPREHEN METABOLIC PANEL: CPT

## 2021-01-01 PROCEDURE — 2580000003 HC RX 258: Performed by: NURSE PRACTITIONER

## 2021-01-01 PROCEDURE — 6370000000 HC RX 637 (ALT 250 FOR IP): Performed by: STUDENT IN AN ORGANIZED HEALTH CARE EDUCATION/TRAINING PROGRAM

## 2021-01-01 PROCEDURE — 6370000000 HC RX 637 (ALT 250 FOR IP): Performed by: INTERNAL MEDICINE

## 2021-01-01 PROCEDURE — 6360000002 HC RX W HCPCS: Performed by: STUDENT IN AN ORGANIZED HEALTH CARE EDUCATION/TRAINING PROGRAM

## 2021-01-01 PROCEDURE — 86140 C-REACTIVE PROTEIN: CPT

## 2021-01-01 PROCEDURE — 2500000003 HC RX 250 WO HCPCS: Performed by: EMERGENCY MEDICINE

## 2021-01-01 PROCEDURE — 83605 ASSAY OF LACTIC ACID: CPT

## 2021-01-01 PROCEDURE — 94668 MNPJ CHEST WALL SBSQ: CPT

## 2021-01-01 PROCEDURE — 96375 TX/PRO/DX INJ NEW DRUG ADDON: CPT

## 2021-01-01 PROCEDURE — 2500000003 HC RX 250 WO HCPCS: Performed by: INTERNAL MEDICINE

## 2021-01-01 PROCEDURE — G8417 CALC BMI ABV UP PARAM F/U: HCPCS | Performed by: INTERNAL MEDICINE

## 2021-01-01 PROCEDURE — 6370000000 HC RX 637 (ALT 250 FOR IP): Performed by: NURSE PRACTITIONER

## 2021-01-01 PROCEDURE — 6360000002 HC RX W HCPCS: Performed by: NURSE PRACTITIONER

## 2021-01-01 PROCEDURE — 6360000002 HC RX W HCPCS: Performed by: EMERGENCY MEDICINE

## 2021-01-01 PROCEDURE — 94761 N-INVAS EAR/PLS OXIMETRY MLT: CPT

## 2021-01-01 PROCEDURE — 6360000002 HC RX W HCPCS: Performed by: INTERNAL MEDICINE

## 2021-01-01 PROCEDURE — 2580000003 HC RX 258: Performed by: EMERGENCY MEDICINE

## 2021-01-01 PROCEDURE — 2060000000 HC ICU INTERMEDIATE R&B

## 2021-01-01 PROCEDURE — 2580000003 HC RX 258: Performed by: STUDENT IN AN ORGANIZED HEALTH CARE EDUCATION/TRAINING PROGRAM

## 2021-01-01 PROCEDURE — 93010 ELECTROCARDIOGRAM REPORT: CPT | Performed by: INTERNAL MEDICINE

## 2021-01-01 PROCEDURE — 1200000000 HC SEMI PRIVATE

## 2021-01-01 PROCEDURE — 85520 HEPARIN ASSAY: CPT

## 2021-01-01 PROCEDURE — 1036F TOBACCO NON-USER: CPT | Performed by: INTERNAL MEDICINE

## 2021-01-01 PROCEDURE — 96365 THER/PROPH/DIAG IV INF INIT: CPT

## 2021-01-01 PROCEDURE — 6370000000 HC RX 637 (ALT 250 FOR IP): Performed by: EMERGENCY MEDICINE

## 2021-01-01 PROCEDURE — 94640 AIRWAY INHALATION TREATMENT: CPT

## 2021-01-01 PROCEDURE — 36556 INSERT NON-TUNNEL CV CATH: CPT

## 2021-01-01 PROCEDURE — 85025 COMPLETE CBC W/AUTO DIFF WBC: CPT

## 2021-01-01 PROCEDURE — 80048 BASIC METABOLIC PNL TOTAL CA: CPT

## 2021-01-01 PROCEDURE — 94660 CPAP INITIATION&MGMT: CPT

## 2021-01-01 PROCEDURE — 2700000000 HC OXYGEN THERAPY PER DAY

## 2021-01-01 PROCEDURE — 86738 MYCOPLASMA ANTIBODY: CPT

## 2021-01-01 PROCEDURE — 99214 OFFICE O/P EST MOD 30 MIN: CPT | Performed by: INTERNAL MEDICINE

## 2021-01-01 PROCEDURE — 36415 COLL VENOUS BLD VENIPUNCTURE: CPT

## 2021-01-01 PROCEDURE — 96376 TX/PRO/DX INJ SAME DRUG ADON: CPT

## 2021-01-01 PROCEDURE — 71045 X-RAY EXAM CHEST 1 VIEW: CPT

## 2021-01-01 PROCEDURE — 3017F COLORECTAL CA SCREEN DOC REV: CPT | Performed by: INTERNAL MEDICINE

## 2021-01-01 PROCEDURE — 94667 MNPJ CHEST WALL 1ST: CPT

## 2021-01-01 PROCEDURE — 83880 ASSAY OF NATRIURETIC PEPTIDE: CPT

## 2021-01-01 PROCEDURE — 2500000003 HC RX 250 WO HCPCS: Performed by: STUDENT IN AN ORGANIZED HEALTH CARE EDUCATION/TRAINING PROGRAM

## 2021-01-01 PROCEDURE — 99239 HOSP IP/OBS DSCHRG MGMT >30: CPT | Performed by: INTERNAL MEDICINE

## 2021-01-01 PROCEDURE — 99233 SBSQ HOSP IP/OBS HIGH 50: CPT | Performed by: INTERNAL MEDICINE

## 2021-01-01 PROCEDURE — 87635 SARS-COV-2 COVID-19 AMP PRB: CPT

## 2021-01-01 PROCEDURE — 93005 ELECTROCARDIOGRAM TRACING: CPT | Performed by: EMERGENCY MEDICINE

## 2021-01-01 PROCEDURE — 85610 PROTHROMBIN TIME: CPT

## 2021-01-01 PROCEDURE — 99291 CRITICAL CARE FIRST HOUR: CPT | Performed by: INTERNAL MEDICINE

## 2021-01-01 PROCEDURE — 87040 BLOOD CULTURE FOR BACTERIA: CPT

## 2021-01-01 PROCEDURE — G8427 DOCREV CUR MEDS BY ELIG CLIN: HCPCS | Performed by: INTERNAL MEDICINE

## 2021-01-01 PROCEDURE — 36620 INSERTION CATHETER ARTERY: CPT

## 2021-01-01 PROCEDURE — 84484 ASSAY OF TROPONIN QUANT: CPT

## 2021-01-01 PROCEDURE — 96374 THER/PROPH/DIAG INJ IV PUSH: CPT

## 2021-01-01 PROCEDURE — 93005 ELECTROCARDIOGRAM TRACING: CPT | Performed by: STUDENT IN AN ORGANIZED HEALTH CARE EDUCATION/TRAINING PROGRAM

## 2021-01-01 PROCEDURE — 84443 ASSAY THYROID STIM HORMONE: CPT

## 2021-01-01 PROCEDURE — 87449 NOS EACH ORGANISM AG IA: CPT

## 2021-01-01 PROCEDURE — 83735 ASSAY OF MAGNESIUM: CPT

## 2021-01-01 PROCEDURE — G8482 FLU IMMUNIZE ORDER/ADMIN: HCPCS | Performed by: INTERNAL MEDICINE

## 2021-01-01 PROCEDURE — 94669 MECHANICAL CHEST WALL OSCILL: CPT

## 2021-01-01 PROCEDURE — 87899 AGENT NOS ASSAY W/OPTIC: CPT

## 2021-01-01 PROCEDURE — 96368 THER/DIAG CONCURRENT INF: CPT

## 2021-01-01 PROCEDURE — 99285 EMERGENCY DEPT VISIT HI MDM: CPT

## 2021-01-01 PROCEDURE — 96367 TX/PROPH/DG ADDL SEQ IV INF: CPT

## 2021-01-01 PROCEDURE — 31500 INSERT EMERGENCY AIRWAY: CPT

## 2021-01-01 RX ORDER — SODIUM CHLORIDE 0.9 % (FLUSH) 0.9 %
5-40 SYRINGE (ML) INJECTION PRN
Status: DISCONTINUED | OUTPATIENT
Start: 2021-01-01 | End: 2021-01-01 | Stop reason: HOSPADM

## 2021-01-01 RX ORDER — DILTIAZEM HYDROCHLORIDE 60 MG/1
60 TABLET, FILM COATED ORAL 2 TIMES DAILY
Status: DISCONTINUED | OUTPATIENT
Start: 2021-01-01 | End: 2021-01-01 | Stop reason: HOSPADM

## 2021-01-01 RX ORDER — LISINOPRIL 10 MG/1
10 TABLET ORAL DAILY
Status: DISCONTINUED | OUTPATIENT
Start: 2021-01-01 | End: 2021-01-01 | Stop reason: HOSPADM

## 2021-01-01 RX ORDER — ALBUTEROL SULFATE 2.5 MG/3ML
2.5 SOLUTION RESPIRATORY (INHALATION) EVERY 6 HOURS PRN
Status: CANCELLED | OUTPATIENT
Start: 2021-01-01

## 2021-01-01 RX ORDER — LISINOPRIL 20 MG/1
20 TABLET ORAL DAILY
Status: ON HOLD | COMMUNITY
End: 2021-01-01 | Stop reason: HOSPADM

## 2021-01-01 RX ORDER — METHYLPREDNISOLONE SODIUM SUCCINATE 125 MG/2ML
60 INJECTION, POWDER, LYOPHILIZED, FOR SOLUTION INTRAMUSCULAR; INTRAVENOUS EVERY 8 HOURS
Status: CANCELLED | OUTPATIENT
Start: 2021-01-01

## 2021-01-01 RX ORDER — LISINOPRIL 10 MG/1
10 TABLET ORAL DAILY
Qty: 30 TABLET | Refills: 0 | Status: SHIPPED | OUTPATIENT
Start: 2021-01-01 | End: 2021-01-01 | Stop reason: DRUGHIGH

## 2021-01-01 RX ORDER — ALBUTEROL SULFATE 2.5 MG/3ML
2.5 SOLUTION RESPIRATORY (INHALATION) EVERY 6 HOURS PRN
Qty: 360 EACH | Refills: 3 | Status: SHIPPED | OUTPATIENT
Start: 2021-01-01

## 2021-01-01 RX ORDER — LISINOPRIL 20 MG/1
20 TABLET ORAL DAILY
Status: DISCONTINUED | OUTPATIENT
Start: 2021-01-01 | End: 2021-01-01

## 2021-01-01 RX ORDER — ACETAMINOPHEN 650 MG/1
650 SUPPOSITORY RECTAL EVERY 6 HOURS PRN
Status: CANCELLED | OUTPATIENT
Start: 2021-01-01

## 2021-01-01 RX ORDER — ACETYLCYSTEINE 200 MG/ML
3 SOLUTION ORAL; RESPIRATORY (INHALATION) 2 TIMES DAILY
Status: CANCELLED | OUTPATIENT
Start: 2021-01-01

## 2021-01-01 RX ORDER — PANTOPRAZOLE SODIUM 40 MG/1
40 TABLET, DELAYED RELEASE ORAL
Qty: 90 TABLET | Refills: 3 | Status: SHIPPED | OUTPATIENT
Start: 2021-01-01 | End: 2021-01-01 | Stop reason: SDUPTHER

## 2021-01-01 RX ORDER — MAGNESIUM SULFATE HEPTAHYDRATE 500 MG/ML
INJECTION, SOLUTION INTRAMUSCULAR; INTRAVENOUS DAILY PRN
Status: COMPLETED | OUTPATIENT
Start: 2021-01-01 | End: 2021-01-01

## 2021-01-01 RX ORDER — FUROSEMIDE 20 MG/1
20 TABLET ORAL DAILY PRN
Qty: 90 TABLET | Refills: 3 | Status: SHIPPED | OUTPATIENT
Start: 2021-01-01

## 2021-01-01 RX ORDER — ONDANSETRON 4 MG/1
4 TABLET, ORALLY DISINTEGRATING ORAL EVERY 8 HOURS PRN
Status: CANCELLED | OUTPATIENT
Start: 2021-01-01

## 2021-01-01 RX ORDER — METOPROLOL TARTRATE 5 MG/5ML
5 INJECTION INTRAVENOUS EVERY 6 HOURS
Status: DISCONTINUED | OUTPATIENT
Start: 2021-01-01 | End: 2021-01-01

## 2021-01-01 RX ORDER — LORAZEPAM 0.5 MG/1
0.5 TABLET ORAL 2 TIMES DAILY
COMMUNITY
End: 2021-01-01

## 2021-01-01 RX ORDER — HEPARIN SODIUM 1000 [USP'U]/ML
40 INJECTION, SOLUTION INTRAVENOUS; SUBCUTANEOUS PRN
Status: DISCONTINUED | OUTPATIENT
Start: 2021-01-01 | End: 2021-01-01

## 2021-01-01 RX ORDER — EPINEPHRINE 0.1 MG/ML
SYRINGE (ML) INJECTION DAILY PRN
Status: COMPLETED | OUTPATIENT
Start: 2021-01-01 | End: 2021-01-01

## 2021-01-01 RX ORDER — LEVOTHYROXINE SODIUM 0.03 MG/1
25 TABLET ORAL DAILY
Qty: 90 TABLET | Refills: 3 | Status: SHIPPED | OUTPATIENT
Start: 2021-01-01

## 2021-01-01 RX ORDER — POLYETHYLENE GLYCOL 3350 17 G/17G
17 POWDER, FOR SOLUTION ORAL DAILY PRN
Status: DISCONTINUED | OUTPATIENT
Start: 2021-01-01 | End: 2021-01-01 | Stop reason: HOSPADM

## 2021-01-01 RX ORDER — GUAIFENESIN/DEXTROMETHORPHAN 100-10MG/5
5 SYRUP ORAL EVERY 4 HOURS PRN
Status: DISCONTINUED | OUTPATIENT
Start: 2021-01-01 | End: 2021-01-01

## 2021-01-01 RX ORDER — AMIODARONE HYDROCHLORIDE 50 MG/ML
INJECTION, SOLUTION INTRAVENOUS DAILY PRN
Status: COMPLETED | OUTPATIENT
Start: 2021-01-01 | End: 2021-01-01

## 2021-01-01 RX ORDER — ACETAMINOPHEN 650 MG/1
650 SUPPOSITORY RECTAL EVERY 6 HOURS PRN
Status: DISCONTINUED | OUTPATIENT
Start: 2021-01-01 | End: 2021-01-01 | Stop reason: HOSPADM

## 2021-01-01 RX ORDER — MIDODRINE HYDROCHLORIDE 10 MG/1
10 TABLET ORAL ONCE
Status: DISCONTINUED | OUTPATIENT
Start: 2021-01-01 | End: 2021-07-08 | Stop reason: HOSPADM

## 2021-01-01 RX ORDER — LEVOTHYROXINE SODIUM 0.03 MG/1
25 TABLET ORAL DAILY
Status: DISCONTINUED | OUTPATIENT
Start: 2021-01-01 | End: 2021-01-01 | Stop reason: HOSPADM

## 2021-01-01 RX ORDER — NOREPINEPHRINE BIT/0.9 % NACL 16MG/250ML
INFUSION BOTTLE (ML) INTRAVENOUS CONTINUOUS PRN
Status: COMPLETED | OUTPATIENT
Start: 2021-01-01 | End: 2021-01-01

## 2021-01-01 RX ORDER — IBUPROFEN 800 MG/1
800 TABLET ORAL 2 TIMES DAILY
Status: CANCELLED | OUTPATIENT
Start: 2021-01-01

## 2021-01-01 RX ORDER — ONDANSETRON 2 MG/ML
4 INJECTION INTRAMUSCULAR; INTRAVENOUS EVERY 6 HOURS PRN
Status: DISCONTINUED | OUTPATIENT
Start: 2021-01-01 | End: 2021-01-01 | Stop reason: HOSPADM

## 2021-01-01 RX ORDER — CALCIUM CHLORIDE 100 MG/ML
INJECTION INTRAVENOUS; INTRAVENTRICULAR DAILY PRN
Status: COMPLETED | OUTPATIENT
Start: 2021-01-01 | End: 2021-01-01

## 2021-01-01 RX ORDER — SODIUM CHLORIDE 0.9 % (FLUSH) 0.9 %
5-40 SYRINGE (ML) INJECTION PRN
Status: CANCELLED | OUTPATIENT
Start: 2021-01-01

## 2021-01-01 RX ORDER — ACETAMINOPHEN 325 MG/1
650 TABLET ORAL EVERY 6 HOURS PRN
Status: CANCELLED | OUTPATIENT
Start: 2021-01-01

## 2021-01-01 RX ORDER — ACETYLCYSTEINE 200 MG/ML
600 SOLUTION ORAL; RESPIRATORY (INHALATION) 2 TIMES DAILY
Status: DISCONTINUED | OUTPATIENT
Start: 2021-01-01 | End: 2021-01-01 | Stop reason: HOSPADM

## 2021-01-01 RX ORDER — IBUPROFEN 800 MG/1
800 TABLET ORAL 2 TIMES DAILY
Qty: 180 TABLET | Refills: 3 | Status: SHIPPED | OUTPATIENT
Start: 2021-01-01

## 2021-01-01 RX ORDER — POTASSIUM CHLORIDE 20 MEQ/1
40 TABLET, EXTENDED RELEASE ORAL PRN
Status: DISCONTINUED | OUTPATIENT
Start: 2021-01-01 | End: 2021-01-01 | Stop reason: HOSPADM

## 2021-01-01 RX ORDER — MUPIROCIN CALCIUM 20 MG/G
CREAM TOPICAL
Qty: 1 TUBE | Refills: 1 | Status: SHIPPED | OUTPATIENT
Start: 2021-01-01 | End: 2021-01-01

## 2021-01-01 RX ORDER — FUROSEMIDE 20 MG/1
20 TABLET ORAL DAILY PRN
Status: CANCELLED | OUTPATIENT
Start: 2021-01-01

## 2021-01-01 RX ORDER — POTASSIUM CHLORIDE 7.45 MG/ML
10 INJECTION INTRAVENOUS PRN
Status: DISCONTINUED | OUTPATIENT
Start: 2021-01-01 | End: 2021-01-01 | Stop reason: HOSPADM

## 2021-01-01 RX ORDER — DOPAMINE HYDROCHLORIDE 160 MG/100ML
2-20 INJECTION, SOLUTION INTRAVENOUS CONTINUOUS
Status: DISCONTINUED | OUTPATIENT
Start: 2021-01-01 | End: 2021-07-08 | Stop reason: HOSPADM

## 2021-01-01 RX ORDER — SODIUM CHLORIDE 0.9 % (FLUSH) 0.9 %
5-40 SYRINGE (ML) INJECTION EVERY 12 HOURS SCHEDULED
Status: DISCONTINUED | OUTPATIENT
Start: 2021-01-01 | End: 2021-01-01 | Stop reason: HOSPADM

## 2021-01-01 RX ORDER — DILTIAZEM HYDROCHLORIDE 60 MG/1
60 TABLET, FILM COATED ORAL 2 TIMES DAILY
Qty: 120 TABLET | Refills: 3 | Status: SHIPPED | OUTPATIENT
Start: 2021-01-01

## 2021-01-01 RX ORDER — CARVEDILOL 6.25 MG/1
6.25 TABLET ORAL 2 TIMES DAILY WITH MEALS
Qty: 60 TABLET | Refills: 3 | Status: SHIPPED | OUTPATIENT
Start: 2021-01-01

## 2021-01-01 RX ORDER — ONDANSETRON 2 MG/ML
4 INJECTION INTRAMUSCULAR; INTRAVENOUS EVERY 6 HOURS PRN
Status: CANCELLED | OUTPATIENT
Start: 2021-01-01

## 2021-01-01 RX ORDER — LEVOFLOXACIN 500 MG/1
500 TABLET, FILM COATED ORAL DAILY
Qty: 5 TABLET | Refills: 0 | Status: SHIPPED | OUTPATIENT
Start: 2021-01-01 | End: 2021-01-01

## 2021-01-01 RX ORDER — METHYLPREDNISOLONE SODIUM SUCCINATE 125 MG/2ML
125 INJECTION, POWDER, LYOPHILIZED, FOR SOLUTION INTRAMUSCULAR; INTRAVENOUS ONCE
Status: COMPLETED | OUTPATIENT
Start: 2021-01-01 | End: 2021-01-01

## 2021-01-01 RX ORDER — ACETAMINOPHEN 325 MG/1
650 TABLET ORAL EVERY 6 HOURS PRN
Status: DISCONTINUED | OUTPATIENT
Start: 2021-01-01 | End: 2021-01-01 | Stop reason: HOSPADM

## 2021-01-01 RX ORDER — CARVEDILOL 6.25 MG/1
6.25 TABLET ORAL 2 TIMES DAILY WITH MEALS
Status: DISCONTINUED | OUTPATIENT
Start: 2021-01-01 | End: 2021-01-01 | Stop reason: HOSPADM

## 2021-01-01 RX ORDER — AZITHROMYCIN 500 MG/1
500 TABLET, FILM COATED ORAL DAILY
Status: COMPLETED | OUTPATIENT
Start: 2021-01-01 | End: 2021-01-01

## 2021-01-01 RX ORDER — LEVOTHYROXINE SODIUM 0.03 MG/1
25 TABLET ORAL DAILY
Status: DISCONTINUED | OUTPATIENT
Start: 2021-01-01 | End: 2021-01-01

## 2021-01-01 RX ORDER — ONDANSETRON 4 MG/1
4 TABLET, ORALLY DISINTEGRATING ORAL EVERY 8 HOURS PRN
Status: DISCONTINUED | OUTPATIENT
Start: 2021-01-01 | End: 2021-01-01 | Stop reason: HOSPADM

## 2021-01-01 RX ORDER — FUROSEMIDE 20 MG/1
20 TABLET ORAL DAILY PRN
Status: DISCONTINUED | OUTPATIENT
Start: 2021-01-01 | End: 2021-01-01 | Stop reason: HOSPADM

## 2021-01-01 RX ORDER — ZOLPIDEM TARTRATE 12.5 MG/1
12.5 TABLET, FILM COATED, EXTENDED RELEASE ORAL NIGHTLY PRN
Status: DISCONTINUED | OUTPATIENT
Start: 2021-01-01 | End: 2021-01-01 | Stop reason: CLARIF

## 2021-01-01 RX ORDER — PANTOPRAZOLE SODIUM 40 MG/1
40 TABLET, DELAYED RELEASE ORAL 2 TIMES DAILY
Status: DISCONTINUED | OUTPATIENT
Start: 2021-01-01 | End: 2021-01-01 | Stop reason: HOSPADM

## 2021-01-01 RX ORDER — HEPARIN SODIUM 1000 [USP'U]/ML
80 INJECTION, SOLUTION INTRAVENOUS; SUBCUTANEOUS PRN
Status: DISCONTINUED | OUTPATIENT
Start: 2021-01-01 | End: 2021-01-01

## 2021-01-01 RX ORDER — ATROPINE SULFATE 0.1 MG/ML
INJECTION INTRAVENOUS DAILY PRN
Status: COMPLETED | OUTPATIENT
Start: 2021-01-01 | End: 2021-01-01

## 2021-01-01 RX ORDER — LISINOPRIL 10 MG/1
10 TABLET ORAL DAILY
Status: DISCONTINUED | OUTPATIENT
Start: 2021-01-01 | End: 2021-01-01

## 2021-01-01 RX ORDER — ALBUTEROL SULFATE 2.5 MG/3ML
2.5 SOLUTION RESPIRATORY (INHALATION) EVERY 6 HOURS PRN
Status: DISCONTINUED | OUTPATIENT
Start: 2021-01-01 | End: 2021-01-01

## 2021-01-01 RX ORDER — LEVALBUTEROL INHALATION SOLUTION 0.63 MG/3ML
0.63 SOLUTION RESPIRATORY (INHALATION) EVERY 6 HOURS PRN
Status: DISCONTINUED | OUTPATIENT
Start: 2021-01-01 | End: 2021-01-01

## 2021-01-01 RX ORDER — ALBUTEROL SULFATE 90 UG/1
2 AEROSOL, METERED RESPIRATORY (INHALATION) EVERY 6 HOURS PRN
Status: CANCELLED | OUTPATIENT
Start: 2021-01-01

## 2021-01-01 RX ORDER — EPINEPHRINE 1 MG/ML
INJECTION, SOLUTION, CONCENTRATE INTRAVENOUS DAILY PRN
Status: COMPLETED | OUTPATIENT
Start: 2021-01-01 | End: 2021-01-01

## 2021-01-01 RX ORDER — HEPARIN SODIUM 10000 [USP'U]/100ML
18 INJECTION, SOLUTION INTRAVENOUS CONTINUOUS
Status: DISCONTINUED | OUTPATIENT
Start: 2021-01-01 | End: 2021-01-01

## 2021-01-01 RX ORDER — PREDNISONE 20 MG/1
20 TABLET ORAL DAILY
Status: DISCONTINUED | OUTPATIENT
Start: 2021-01-01 | End: 2021-01-01

## 2021-01-01 RX ORDER — DILTIAZEM HYDROCHLORIDE 60 MG/1
60 TABLET, FILM COATED ORAL 2 TIMES DAILY
Status: CANCELLED | OUTPATIENT
Start: 2021-01-01

## 2021-01-01 RX ORDER — NOREPINEPHRINE BIT/0.9 % NACL 16MG/250ML
INFUSION BOTTLE (ML) INTRAVENOUS
Status: DISCONTINUED
Start: 2021-01-01 | End: 2021-07-08 | Stop reason: HOSPADM

## 2021-01-01 RX ORDER — AZITHROMYCIN 250 MG/1
500 TABLET, FILM COATED ORAL ONCE
Status: COMPLETED | OUTPATIENT
Start: 2021-01-01 | End: 2021-01-01

## 2021-01-01 RX ORDER — LEVALBUTEROL INHALATION SOLUTION 0.63 MG/3ML
0.63 SOLUTION RESPIRATORY (INHALATION)
Status: DISCONTINUED | OUTPATIENT
Start: 2021-01-01 | End: 2021-01-01 | Stop reason: HOSPADM

## 2021-01-01 RX ORDER — MIDODRINE HYDROCHLORIDE 5 MG/1
5 TABLET ORAL 3 TIMES DAILY
Status: CANCELLED | OUTPATIENT
Start: 2021-01-01

## 2021-01-01 RX ORDER — MIDODRINE HYDROCHLORIDE 5 MG/1
5 TABLET ORAL 3 TIMES DAILY
COMMUNITY

## 2021-01-01 RX ORDER — MIDODRINE HYDROCHLORIDE 5 MG/1
5 TABLET ORAL 3 TIMES DAILY
Status: DISCONTINUED | OUTPATIENT
Start: 2021-01-01 | End: 2021-01-01

## 2021-01-01 RX ORDER — DILTIAZEM HYDROCHLORIDE 5 MG/ML
20 INJECTION INTRAVENOUS ONCE
Status: COMPLETED | OUTPATIENT
Start: 2021-01-01 | End: 2021-01-01

## 2021-01-01 RX ORDER — LEVOTHYROXINE SODIUM 0.03 MG/1
25 TABLET ORAL DAILY
Status: CANCELLED | OUTPATIENT
Start: 2021-07-08

## 2021-01-01 RX ORDER — METOPROLOL TARTRATE 5 MG/5ML
5 INJECTION INTRAVENOUS ONCE
Status: COMPLETED | OUTPATIENT
Start: 2021-01-01 | End: 2021-01-01

## 2021-01-01 RX ORDER — ACETYLCYSTEINE 200 MG/ML
600 SOLUTION ORAL; RESPIRATORY (INHALATION) 2 TIMES DAILY
Qty: 20 VIAL | Refills: 0 | Status: SHIPPED | OUTPATIENT
Start: 2021-01-01 | End: 2021-01-01

## 2021-01-01 RX ORDER — PREDNISONE 20 MG/1
20 TABLET ORAL DAILY
Status: DISCONTINUED | OUTPATIENT
Start: 2021-01-01 | End: 2021-01-01 | Stop reason: HOSPADM

## 2021-01-01 RX ORDER — ACETYLCYSTEINE 200 MG/ML
SOLUTION ORAL; RESPIRATORY (INHALATION)
Qty: 60 ML | Refills: 2 | Status: SHIPPED | OUTPATIENT
Start: 2021-01-01

## 2021-01-01 RX ORDER — SODIUM CHLORIDE 9 MG/ML
25 INJECTION, SOLUTION INTRAVENOUS PRN
Status: CANCELLED | OUTPATIENT
Start: 2021-01-01

## 2021-01-01 RX ORDER — MIDODRINE HYDROCHLORIDE 5 MG/1
5 TABLET ORAL 3 TIMES DAILY
Status: DISCONTINUED | OUTPATIENT
Start: 2021-01-01 | End: 2021-01-01 | Stop reason: HOSPADM

## 2021-01-01 RX ORDER — CARVEDILOL 6.25 MG/1
6.25 TABLET ORAL 2 TIMES DAILY WITH MEALS
Status: CANCELLED | OUTPATIENT
Start: 2021-01-01

## 2021-01-01 RX ORDER — DILTIAZEM HYDROCHLORIDE 120 MG/1
120 CAPSULE, EXTENDED RELEASE ORAL DAILY
COMMUNITY
End: 2021-01-01 | Stop reason: SDUPTHER

## 2021-01-01 RX ORDER — HEPARIN SODIUM 1000 [USP'U]/ML
80 INJECTION, SOLUTION INTRAVENOUS; SUBCUTANEOUS ONCE
Status: COMPLETED | OUTPATIENT
Start: 2021-01-01 | End: 2021-01-01

## 2021-01-01 RX ORDER — ASPIRIN 325 MG
325 TABLET ORAL ONCE
Status: COMPLETED | OUTPATIENT
Start: 2021-01-01 | End: 2021-01-01

## 2021-01-01 RX ORDER — PANTOPRAZOLE SODIUM 40 MG/1
40 TABLET, DELAYED RELEASE ORAL 2 TIMES DAILY
Qty: 180 TABLET | Refills: 3 | Status: SHIPPED | OUTPATIENT
Start: 2021-01-01

## 2021-01-01 RX ORDER — SODIUM CHLORIDE 9 MG/ML
25 INJECTION, SOLUTION INTRAVENOUS PRN
Status: DISCONTINUED | OUTPATIENT
Start: 2021-01-01 | End: 2021-01-01 | Stop reason: HOSPADM

## 2021-01-01 RX ORDER — DULOXETIN HYDROCHLORIDE 30 MG/1
30 CAPSULE, DELAYED RELEASE ORAL DAILY
Status: DISCONTINUED | OUTPATIENT
Start: 2021-01-01 | End: 2021-01-01

## 2021-01-01 RX ORDER — LORAZEPAM 0.5 MG/1
0.5 TABLET ORAL NIGHTLY PRN
Status: DISCONTINUED | OUTPATIENT
Start: 2021-01-01 | End: 2021-01-01 | Stop reason: HOSPADM

## 2021-01-01 RX ORDER — POLYETHYLENE GLYCOL 3350 17 G/17G
17 POWDER, FOR SOLUTION ORAL DAILY PRN
Status: CANCELLED | OUTPATIENT
Start: 2021-01-01

## 2021-01-01 RX ORDER — SODIUM CHLORIDE 0.9 % (FLUSH) 0.9 %
5-40 SYRINGE (ML) INJECTION EVERY 12 HOURS SCHEDULED
Status: CANCELLED | OUTPATIENT
Start: 2021-01-01

## 2021-01-01 RX ADMIN — DULOXETINE HYDROCHLORIDE 90 MG: 60 CAPSULE, DELAYED RELEASE ORAL at 19:53

## 2021-01-01 RX ADMIN — GUAIFENESIN AND DEXTROMETHORPHAN 5 ML: 100; 10 SYRUP ORAL at 21:19

## 2021-01-01 RX ADMIN — PANTOPRAZOLE SODIUM 40 MG: 40 TABLET, DELAYED RELEASE ORAL at 08:36

## 2021-01-01 RX ADMIN — EPINEPHRINE 0.1 MG: 1 INJECTION, SOLUTION, CONCENTRATE INTRAVENOUS at 20:28

## 2021-01-01 RX ADMIN — AMIODARONE HYDROCHLORIDE 150 MG: 1.5 INJECTION, SOLUTION INTRAVENOUS at 08:21

## 2021-01-01 RX ADMIN — EPINEPHRINE 0.1 MG: 1 INJECTION, SOLUTION, CONCENTRATE INTRAVENOUS at 20:02

## 2021-01-01 RX ADMIN — DILTIAZEM HYDROCHLORIDE 60 MG: 60 TABLET, FILM COATED ORAL at 21:02

## 2021-01-01 RX ADMIN — MIDODRINE HYDROCHLORIDE 5 MG: 5 TABLET ORAL at 14:23

## 2021-01-01 RX ADMIN — PANTOPRAZOLE SODIUM 40 MG: 40 TABLET, DELAYED RELEASE ORAL at 08:10

## 2021-01-01 RX ADMIN — LISINOPRIL 10 MG: 10 TABLET ORAL at 08:25

## 2021-01-01 RX ADMIN — ACETYLCYSTEINE 600 MG: 200 SOLUTION ORAL; RESPIRATORY (INHALATION) at 11:37

## 2021-01-01 RX ADMIN — LEVALBUTEROL HYDROCHLORIDE 0.63 MG: 0.63 SOLUTION RESPIRATORY (INHALATION) at 14:37

## 2021-01-01 RX ADMIN — CARVEDILOL 6.25 MG: 6.25 TABLET, FILM COATED ORAL at 16:56

## 2021-01-01 RX ADMIN — DULOXETINE HYDROCHLORIDE 90 MG: 60 CAPSULE, DELAYED RELEASE ORAL at 08:25

## 2021-01-01 RX ADMIN — AZITHROMYCIN MONOHYDRATE 500 MG: 500 INJECTION, POWDER, LYOPHILIZED, FOR SOLUTION INTRAVENOUS at 18:28

## 2021-01-01 RX ADMIN — LEVALBUTEROL HYDROCHLORIDE 0.63 MG: 0.63 SOLUTION RESPIRATORY (INHALATION) at 07:42

## 2021-01-01 RX ADMIN — AZITHROMYCIN 500 MG: 500 TABLET, FILM COATED ORAL at 08:40

## 2021-01-01 RX ADMIN — DULOXETINE HYDROCHLORIDE 90 MG: 60 CAPSULE, DELAYED RELEASE ORAL at 08:09

## 2021-01-01 RX ADMIN — CARVEDILOL 6.25 MG: 6.25 TABLET, FILM COATED ORAL at 14:23

## 2021-01-01 RX ADMIN — PHENYLEPHRINE HYDROCHLORIDE 10 MCG/MIN: 10 INJECTION INTRAVENOUS at 20:56

## 2021-01-01 RX ADMIN — PANTOPRAZOLE SODIUM 40 MG: 40 TABLET, DELAYED RELEASE ORAL at 08:25

## 2021-01-01 RX ADMIN — MAGNESIUM SULFATE HEPTAHYDRATE 2000 MG: 500 INJECTION, SOLUTION INTRAMUSCULAR; INTRAVENOUS at 20:22

## 2021-01-01 RX ADMIN — ASPIRIN 325 MG ORAL TABLET 325 MG: 325 PILL ORAL at 13:55

## 2021-01-01 RX ADMIN — EPINEPHRINE 0.1 MG: 1 INJECTION, SOLUTION, CONCENTRATE INTRAVENOUS at 19:46

## 2021-01-01 RX ADMIN — LORAZEPAM 0.5 MG: 0.5 TABLET ORAL at 22:13

## 2021-01-01 RX ADMIN — MIDODRINE HYDROCHLORIDE 5 MG: 5 TABLET ORAL at 16:44

## 2021-01-01 RX ADMIN — LEVOTHYROXINE SODIUM 25 MCG: 0.03 TABLET ORAL at 06:02

## 2021-01-01 RX ADMIN — LORAZEPAM 0.5 MG: 0.5 TABLET ORAL at 21:01

## 2021-01-01 RX ADMIN — MIDODRINE HYDROCHLORIDE 5 MG: 5 TABLET ORAL at 21:59

## 2021-01-01 RX ADMIN — PANTOPRAZOLE SODIUM 40 MG: 40 TABLET, DELAYED RELEASE ORAL at 21:00

## 2021-01-01 RX ADMIN — LEVALBUTEROL HYDROCHLORIDE 0.63 MG: 0.63 SOLUTION RESPIRATORY (INHALATION) at 11:13

## 2021-01-01 RX ADMIN — EPINEPHRINE 0.1 MG: 1 INJECTION, SOLUTION, CONCENTRATE INTRAVENOUS at 19:59

## 2021-01-01 RX ADMIN — LEVALBUTEROL HYDROCHLORIDE 0.63 MG: 0.63 SOLUTION RESPIRATORY (INHALATION) at 20:39

## 2021-01-01 RX ADMIN — PANTOPRAZOLE SODIUM 40 MG: 40 TABLET, DELAYED RELEASE ORAL at 21:58

## 2021-01-01 RX ADMIN — DILTIAZEM HYDROCHLORIDE 5 MG/HR: 5 INJECTION INTRAVENOUS at 13:57

## 2021-01-01 RX ADMIN — CARVEDILOL 6.25 MG: 6.25 TABLET, FILM COATED ORAL at 08:05

## 2021-01-01 RX ADMIN — EPINEPHRINE 0.1 MG: 1 INJECTION, SOLUTION, CONCENTRATE INTRAVENOUS at 20:25

## 2021-01-01 RX ADMIN — PREDNISONE 20 MG: 20 TABLET ORAL at 08:36

## 2021-01-01 RX ADMIN — MIDODRINE HYDROCHLORIDE 5 MG: 5 TABLET ORAL at 08:36

## 2021-01-01 RX ADMIN — HEPARIN SODIUM 14 UNITS/KG/HR: 10000 INJECTION, SOLUTION INTRAVENOUS at 04:06

## 2021-01-01 RX ADMIN — CEFTRIAXONE SODIUM 1000 MG: 1 INJECTION, POWDER, FOR SOLUTION INTRAMUSCULAR; INTRAVENOUS at 14:20

## 2021-01-01 RX ADMIN — DILTIAZEM HYDROCHLORIDE 60 MG: 60 TABLET, FILM COATED ORAL at 21:00

## 2021-01-01 RX ADMIN — DILTIAZEM HYDROCHLORIDE 60 MG: 60 TABLET, FILM COATED ORAL at 08:35

## 2021-01-01 RX ADMIN — HEPARIN SODIUM 9220 UNITS: 1000 INJECTION INTRAVENOUS; SUBCUTANEOUS at 13:15

## 2021-01-01 RX ADMIN — METOPROLOL TARTRATE 5 MG: 1 INJECTION, SOLUTION INTRAVENOUS at 06:06

## 2021-01-01 RX ADMIN — LEVOTHYROXINE SODIUM 25 MCG: 0.03 TABLET ORAL at 06:17

## 2021-01-01 RX ADMIN — ATROPINE SULFATE 1 MG: 0.1 INJECTION PARENTERAL at 20:07

## 2021-01-01 RX ADMIN — HEPARIN SODIUM 14 UNITS/KG/HR: 10000 INJECTION, SOLUTION INTRAVENOUS at 16:44

## 2021-01-01 RX ADMIN — PREDNISONE 20 MG: 20 TABLET ORAL at 08:23

## 2021-01-01 RX ADMIN — APIXABAN 5 MG: 5 TABLET, FILM COATED ORAL at 08:09

## 2021-01-01 RX ADMIN — EPINEPHRINE 0.1 MG: 1 INJECTION, SOLUTION, CONCENTRATE INTRAVENOUS at 20:11

## 2021-01-01 RX ADMIN — LEVALBUTEROL HYDROCHLORIDE 0.63 MG: 0.63 SOLUTION RESPIRATORY (INHALATION) at 15:40

## 2021-01-01 RX ADMIN — METOPROLOL TARTRATE 5 MG: 1 INJECTION, SOLUTION INTRAVENOUS at 19:31

## 2021-01-01 RX ADMIN — PANTOPRAZOLE SODIUM 40 MG: 40 TABLET, DELAYED RELEASE ORAL at 08:23

## 2021-01-01 RX ADMIN — Medication 0.1 MG: at 20:46

## 2021-01-01 RX ADMIN — APIXABAN 5 MG: 5 TABLET, FILM COATED ORAL at 08:25

## 2021-01-01 RX ADMIN — CARVEDILOL 6.25 MG: 6.25 TABLET, FILM COATED ORAL at 08:26

## 2021-01-01 RX ADMIN — LEVOTHYROXINE SODIUM 25 MCG: 0.03 TABLET ORAL at 06:06

## 2021-01-01 RX ADMIN — LEVALBUTEROL HYDROCHLORIDE 0.63 MG: 0.63 SOLUTION RESPIRATORY (INHALATION) at 07:09

## 2021-01-01 RX ADMIN — DULOXETINE HYDROCHLORIDE 90 MG: 60 CAPSULE, DELAYED RELEASE ORAL at 08:22

## 2021-01-01 RX ADMIN — EPINEPHRINE 0.1 MG: 1 INJECTION, SOLUTION, CONCENTRATE INTRAVENOUS at 20:08

## 2021-01-01 RX ADMIN — DILTIAZEM HYDROCHLORIDE 60 MG: 60 TABLET, FILM COATED ORAL at 12:47

## 2021-01-01 RX ADMIN — METOPROLOL TARTRATE 5 MG: 1 INJECTION, SOLUTION INTRAVENOUS at 01:32

## 2021-01-01 RX ADMIN — DILTIAZEM HYDROCHLORIDE 20 MG: 5 INJECTION INTRAVENOUS at 13:01

## 2021-01-01 RX ADMIN — SODIUM CHLORIDE, PRESERVATIVE FREE 10 ML: 5 INJECTION INTRAVENOUS at 08:36

## 2021-01-01 RX ADMIN — MIDODRINE HYDROCHLORIDE 5 MG: 5 TABLET ORAL at 08:22

## 2021-01-01 RX ADMIN — LEVOTHYROXINE SODIUM 25 MCG: 0.03 TABLET ORAL at 06:21

## 2021-01-01 RX ADMIN — APIXABAN 5 MG: 5 TABLET, FILM COATED ORAL at 11:41

## 2021-01-01 RX ADMIN — SODIUM CHLORIDE, PRESERVATIVE FREE 10 ML: 5 INJECTION INTRAVENOUS at 08:26

## 2021-01-01 RX ADMIN — VANCOMYCIN HYDROCHLORIDE 2500 MG: 1.5 INJECTION, POWDER, LYOPHILIZED, FOR SOLUTION INTRAVENOUS at 19:32

## 2021-01-01 RX ADMIN — ACETYLCYSTEINE 600 MG: 200 SOLUTION ORAL; RESPIRATORY (INHALATION) at 07:42

## 2021-01-01 RX ADMIN — PANTOPRAZOLE SODIUM 40 MG: 40 TABLET, DELAYED RELEASE ORAL at 19:53

## 2021-01-01 RX ADMIN — SODIUM BICARBONATE 50 MEQ: 84 INJECTION, SOLUTION INTRAVENOUS at 20:29

## 2021-01-01 RX ADMIN — DILTIAZEM HYDROCHLORIDE 60 MG: 60 TABLET, FILM COATED ORAL at 08:09

## 2021-01-01 RX ADMIN — CEFTRIAXONE SODIUM 1000 MG: 1 INJECTION, POWDER, FOR SOLUTION INTRAMUSCULAR; INTRAVENOUS at 14:23

## 2021-01-01 RX ADMIN — AZITHROMYCIN 500 MG: 250 TABLET, FILM COATED ORAL at 13:55

## 2021-01-01 RX ADMIN — METOPROLOL TARTRATE 5 MG: 1 INJECTION, SOLUTION INTRAVENOUS at 18:57

## 2021-01-01 RX ADMIN — DULOXETINE HYDROCHLORIDE 90 MG: 60 CAPSULE, DELAYED RELEASE ORAL at 08:36

## 2021-01-01 RX ADMIN — HEPARIN SODIUM 17 UNITS/KG/HR: 10000 INJECTION, SOLUTION INTRAVENOUS at 00:44

## 2021-01-01 RX ADMIN — METOPROLOL TARTRATE 5 MG: 1 INJECTION, SOLUTION INTRAVENOUS at 06:02

## 2021-01-01 RX ADMIN — MIDODRINE HYDROCHLORIDE 5 MG: 5 TABLET ORAL at 21:01

## 2021-01-01 RX ADMIN — SODIUM CHLORIDE, PRESERVATIVE FREE 10 ML: 5 INJECTION INTRAVENOUS at 22:00

## 2021-01-01 RX ADMIN — Medication 10 MCG/MIN: at 20:18

## 2021-01-01 RX ADMIN — MIDODRINE HYDROCHLORIDE 5 MG: 5 TABLET ORAL at 19:52

## 2021-01-01 RX ADMIN — EPINEPHRINE 0.1 MG: 1 INJECTION, SOLUTION, CONCENTRATE INTRAVENOUS at 20:05

## 2021-01-01 RX ADMIN — APIXABAN 5 MG: 5 TABLET, FILM COATED ORAL at 21:57

## 2021-01-01 RX ADMIN — SODIUM CHLORIDE, PRESERVATIVE FREE 10 ML: 5 INJECTION INTRAVENOUS at 21:00

## 2021-01-01 RX ADMIN — CEFEPIME HYDROCHLORIDE 2000 MG: 2 INJECTION, POWDER, FOR SOLUTION INTRAVENOUS at 17:49

## 2021-01-01 RX ADMIN — SODIUM CHLORIDE 25 ML: 9 INJECTION, SOLUTION INTRAVENOUS at 16:58

## 2021-01-01 RX ADMIN — SODIUM CHLORIDE, PRESERVATIVE FREE 10 ML: 5 INJECTION INTRAVENOUS at 21:06

## 2021-01-01 RX ADMIN — SODIUM CHLORIDE, PRESERVATIVE FREE 10 ML: 5 INJECTION INTRAVENOUS at 08:29

## 2021-01-01 RX ADMIN — EPINEPHRINE 0.1 MG: 1 INJECTION, SOLUTION, CONCENTRATE INTRAVENOUS at 20:31

## 2021-01-01 RX ADMIN — LEVALBUTEROL HYDROCHLORIDE 0.63 MG: 0.63 SOLUTION RESPIRATORY (INHALATION) at 18:49

## 2021-01-01 RX ADMIN — ACETYLCYSTEINE 600 MG: 200 SOLUTION ORAL; RESPIRATORY (INHALATION) at 18:49

## 2021-01-01 RX ADMIN — SODIUM CHLORIDE, PRESERVATIVE FREE 10 ML: 5 INJECTION INTRAVENOUS at 08:10

## 2021-01-01 RX ADMIN — LISINOPRIL 10 MG: 10 TABLET ORAL at 08:08

## 2021-01-01 RX ADMIN — METOPROLOL TARTRATE 5 MG: 1 INJECTION, SOLUTION INTRAVENOUS at 00:44

## 2021-01-01 RX ADMIN — LEVALBUTEROL HYDROCHLORIDE 0.63 MG: 0.63 SOLUTION RESPIRATORY (INHALATION) at 08:18

## 2021-01-01 RX ADMIN — APIXABAN 5 MG: 5 TABLET, FILM COATED ORAL at 21:02

## 2021-01-01 RX ADMIN — ACETYLCYSTEINE 600 MG: 200 SOLUTION ORAL; RESPIRATORY (INHALATION) at 20:39

## 2021-01-01 RX ADMIN — LISINOPRIL 20 MG: 20 TABLET ORAL at 08:35

## 2021-01-01 RX ADMIN — PANTOPRAZOLE SODIUM 40 MG: 40 TABLET, DELAYED RELEASE ORAL at 21:03

## 2021-01-01 RX ADMIN — MIDODRINE HYDROCHLORIDE 5 MG: 5 TABLET ORAL at 08:26

## 2021-01-01 RX ADMIN — EPINEPHRINE 0.1 MG: 1 INJECTION, SOLUTION, CONCENTRATE INTRAVENOUS at 19:53

## 2021-01-01 RX ADMIN — AMIODARONE HYDROCHLORIDE 300 MG: 50 INJECTION, SOLUTION INTRAVENOUS at 19:58

## 2021-01-01 RX ADMIN — PREDNISONE 20 MG: 20 TABLET ORAL at 08:08

## 2021-01-01 RX ADMIN — CALCIUM CHLORIDE 1000 MG: 100 INJECTION INTRAVENOUS; INTRAVENTRICULAR at 20:29

## 2021-01-01 RX ADMIN — CEFTRIAXONE SODIUM 1000 MG: 1 INJECTION, POWDER, FOR SOLUTION INTRAMUSCULAR; INTRAVENOUS at 13:57

## 2021-01-01 RX ADMIN — METHYLPREDNISOLONE SODIUM SUCCINATE 125 MG: 125 INJECTION, POWDER, FOR SOLUTION INTRAMUSCULAR; INTRAVENOUS at 17:47

## 2021-01-01 RX ADMIN — LEVALBUTEROL HYDROCHLORIDE 0.63 MG: 0.63 SOLUTION RESPIRATORY (INHALATION) at 11:31

## 2021-01-01 RX ADMIN — LORAZEPAM 0.5 MG: 0.5 TABLET ORAL at 22:09

## 2021-01-01 RX ADMIN — LEVALBUTEROL HYDROCHLORIDE 0.63 MG: 0.63 SOLUTION RESPIRATORY (INHALATION) at 15:48

## 2021-01-01 RX ADMIN — LEVALBUTEROL HYDROCHLORIDE 0.63 MG: 0.63 SOLUTION RESPIRATORY (INHALATION) at 11:37

## 2021-01-01 RX ADMIN — EPINEPHRINE 0.1 MG: 1 INJECTION, SOLUTION, CONCENTRATE INTRAVENOUS at 19:49

## 2021-01-01 RX ADMIN — LISINOPRIL 20 MG: 20 TABLET ORAL at 08:23

## 2021-01-01 RX ADMIN — PREDNISONE 20 MG: 20 TABLET ORAL at 19:58

## 2021-01-01 RX ADMIN — MIDODRINE HYDROCHLORIDE 5 MG: 5 TABLET ORAL at 21:00

## 2021-01-01 RX ADMIN — CEFTRIAXONE SODIUM 1000 MG: 1 INJECTION, POWDER, FOR SOLUTION INTRAMUSCULAR; INTRAVENOUS at 16:58

## 2021-01-01 RX ADMIN — MIDODRINE HYDROCHLORIDE 5 MG: 5 TABLET ORAL at 08:09

## 2021-01-01 RX ADMIN — EPINEPHRINE 0.1 MG: 1 INJECTION, SOLUTION, CONCENTRATE INTRAVENOUS at 19:56

## 2021-01-01 RX ADMIN — LEVALBUTEROL HYDROCHLORIDE 0.63 MG: 0.63 SOLUTION RESPIRATORY (INHALATION) at 20:31

## 2021-01-01 RX ADMIN — AZITHROMYCIN 500 MG: 500 TABLET, FILM COATED ORAL at 12:46

## 2021-01-01 RX ADMIN — HEPARIN SODIUM 18 UNITS/KG/HR: 10000 INJECTION, SOLUTION INTRAVENOUS at 13:16

## 2021-01-01 RX ADMIN — PREDNISONE 20 MG: 20 TABLET ORAL at 08:25

## 2021-01-01 RX ADMIN — ACETYLCYSTEINE 600 MG: 200 SOLUTION ORAL; RESPIRATORY (INHALATION) at 08:18

## 2021-01-01 RX ADMIN — LORAZEPAM 0.5 MG: 0.5 TABLET ORAL at 23:18

## 2021-01-01 RX ADMIN — CEFTRIAXONE SODIUM 1000 MG: 1 INJECTION, POWDER, FOR SOLUTION INTRAMUSCULAR; INTRAVENOUS at 16:43

## 2021-01-01 RX ADMIN — ACETYLCYSTEINE 600 MG: 200 SOLUTION ORAL; RESPIRATORY (INHALATION) at 11:31

## 2021-01-01 RX ADMIN — METOPROLOL TARTRATE 5 MG: 1 INJECTION, SOLUTION INTRAVENOUS at 19:44

## 2021-01-01 RX ADMIN — DOPAMINE HYDROCHLORIDE IN DEXTROSE 2 MCG/KG/MIN: 1.6 INJECTION, SOLUTION INTRAVENOUS at 20:46

## 2021-01-01 RX ADMIN — DILTIAZEM HYDROCHLORIDE 60 MG: 60 TABLET, FILM COATED ORAL at 08:25

## 2021-01-01 RX ADMIN — LEVALBUTEROL HYDROCHLORIDE 0.63 MG: 0.63 SOLUTION RESPIRATORY (INHALATION) at 15:18

## 2021-01-01 RX ADMIN — DILTIAZEM HYDROCHLORIDE 60 MG: 60 TABLET, FILM COATED ORAL at 21:58

## 2021-01-01 ASSESSMENT — ENCOUNTER SYMPTOMS
RHINORRHEA: 0
SHORTNESS OF BREATH: 0
ABDOMINAL DISTENTION: 0
DIARRHEA: 0
COUGH: 1
WHEEZING: 1
CHEST TIGHTNESS: 0
WHEEZING: 0
DIARRHEA: 0
SORE THROAT: 0
CONSTIPATION: 0
COUGH: 1
WHEEZING: 0
ABDOMINAL DISTENTION: 0
BLOOD IN STOOL: 0
EYES NEGATIVE: 1
CONSTIPATION: 0
SHORTNESS OF BREATH: 1
COUGH: 1
DIARRHEA: 0
COUGH: 1
EYES NEGATIVE: 1
CONSTIPATION: 0
EYE ITCHING: 0
WHEEZING: 0
SORE THROAT: 0
SHORTNESS OF BREATH: 1
SHORTNESS OF BREATH: 1
ABDOMINAL PAIN: 0
COLOR CHANGE: 0
VOMITING: 0
COUGH: 1
NAUSEA: 0
ABDOMINAL PAIN: 0
CONSTIPATION: 0
ABDOMINAL PAIN: 0
BACK PAIN: 0
DIARRHEA: 0
SHORTNESS OF BREATH: 1
EYE PAIN: 0
SORE THROAT: 0
SPUTUM PRODUCTION: 1
EYE REDNESS: 0
DIARRHEA: 0
EYES NEGATIVE: 1
ABDOMINAL DISTENTION: 0
NAUSEA: 0
APNEA: 0
DIARRHEA: 0
COLOR CHANGE: 0
CHEST TIGHTNESS: 0
NAUSEA: 0
COUGH: 0
NAUSEA: 0
NAUSEA: 0
EYE DISCHARGE: 0
VOMITING: 0
SHORTNESS OF BREATH: 1
CHOKING: 0
VOMITING: 0
VOMITING: 0
ABDOMINAL PAIN: 0
CONSTIPATION: 0
ABDOMINAL PAIN: 0
COLOR CHANGE: 1
VOMITING: 0
ABDOMINAL PAIN: 0

## 2021-01-01 ASSESSMENT — PAIN DESCRIPTION - LOCATION: LOCATION: GENERALIZED

## 2021-01-01 ASSESSMENT — PAIN SCALES - GENERAL
PAINLEVEL_OUTOF10: 0
PAINLEVEL_OUTOF10: 5
PAINLEVEL_OUTOF10: 0
PAINLEVEL_OUTOF10: 2
PAINLEVEL_OUTOF10: 0

## 2021-01-01 ASSESSMENT — PAIN - FUNCTIONAL ASSESSMENT: PAIN_FUNCTIONAL_ASSESSMENT: PREVENTS OR INTERFERES SOME ACTIVE ACTIVITIES AND ADLS

## 2021-01-01 ASSESSMENT — PAIN DESCRIPTION - PAIN TYPE
TYPE: CHRONIC PAIN
TYPE: CHRONIC PAIN

## 2021-01-01 ASSESSMENT — PAIN DESCRIPTION - DESCRIPTORS: DESCRIPTORS: ACHING

## 2021-01-01 ASSESSMENT — PAIN DESCRIPTION - ONSET: ONSET: ON-GOING

## 2021-01-01 ASSESSMENT — PAIN DESCRIPTION - PROGRESSION: CLINICAL_PROGRESSION: NOT CHANGED

## 2021-01-01 ASSESSMENT — PAIN DESCRIPTION - FREQUENCY: FREQUENCY: CONTINUOUS

## 2021-04-28 NOTE — PLAN OF CARE
Nutrition Problem #1: Overweight/Obese  Intervention: Food and/or Nutrient Delivery: Continue Current Diet  Nutritional Goals: PO intakes to meet greater than 75% of estimated nutrition needs 28-Apr-2021 04:58

## 2021-05-03 NOTE — TELEPHONE ENCOUNTER
PT wanted to verify that he only has to have a1c completed for his labs before his 6/10 appt with Dr Deirdre Song.     Please call pt back to confirm  Number in chart is best

## 2021-06-02 PROBLEM — J96.21 ACUTE ON CHRONIC RESPIRATORY FAILURE WITH HYPOXIA (HCC): Status: ACTIVE | Noted: 2021-01-01

## 2021-06-02 PROBLEM — I48.92 ATRIAL FLUTTER (HCC): Status: ACTIVE | Noted: 2021-01-01

## 2021-06-02 NOTE — PROGRESS NOTES
Patient progress reported to Dr Jessica Paniagua and Dr Arslan Berrios. Orders Received. Report called to Jairo Candelaria, ICU. Patient transferred. Patient has no questions at this time. Patient educated. Patient states that he will call his wife.

## 2021-06-02 NOTE — PROGRESS NOTES
RN notified Dr. Andrew Gee that the patient had a 25 beat run of vtach. New orders to transfer to ICU for closer monitoring.

## 2021-06-02 NOTE — ED NOTES
Report given to Mani Larios from [place]. Report method by phone   The following was reviewed with receiving RN:    Any medication (asa, rocephin, zithromax, heparin, diltiazem) or safety alerts were reviewed. Any pending diagnostics (will draw 2nd trop, watch for results) and notifications were also reviewed, as well as any safety concerns (pt. Unable to mobilize bc of increased SOB) or issues, abnormal labs (pro-BNP, trop), abnormal imaging, and abnormal assessment findings RLE edema and vascular discoloration). Questions were answered. IV placement and locations noted.            Jose Antonio Galeas RN  06/02/21 [id number]

## 2021-06-02 NOTE — PROGRESS NOTES
Per RN request, pt agreed to be placed on BIPAP 12/6, 45%, SPO2 90%. NONINVASIVE VENTILATION    PROVIDE OPTIMAL VENTILATION/ACCEPTABLE SPO2   IMPLEMENT NONINVASIVE VENTILATION PROTOCOL   MAINTAIN ACCEPTABLE SPO2   ASSESS SKIN INTEGRITY/BREAKDOWN SCORE   PATIENT EDUCATION AS NEEDED   BIPAP AS NEEDED

## 2021-06-02 NOTE — H&P
[address] [place] [address]     HISTORY AND PHYSICAL EXAMINATION            Date:   6/3/2021  Patient name:  Dev Garcia  Date of admission:  6/2/2021 12:03 PM  MRN:   [id number]  Account:  [de-identified]  YOB: 1955  PCP:    Justyn Resendiz MD  Room:   2015/2015-01  Code Status:    Full Code    Chief Complaint:     Chief Complaint   Patient presents with    Tachycardia    Dizziness     Lightheadedness    Fatigue       History Obtained From:     patient, spouse, electronic medical record    History of Present Illness: The patient is a 60 y.o. Non-/non  male who presents withTachycardia, Dizziness (Lightheadedness), and Fatigue   and he is admitted to the hospital for the management of a flutter. 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 Flutter  and cardiomyopathy. Patient reports being fatigued, dizziness, and productive cough brown phlegm. Patient is on 6 L home oxygen but has required 8 L. Today he continues to have thick whitish sputum production. Patient denies fever, chills, chest pain, or palpitations. Patient denies any recent sick contacts. In the ED, patient is afebrile tachypneic and tachycardic 8 L oxygen via nasal cannula 95% saturation. Labs bicarb 35, chloride 96, glucose 123. proBNP 2964 (baseline around 800), troponin 38>30 (baseline 15). CBC within normal limits. Chest x-ray shows dense bilateral opacities superimposed with underlying interstitial lung disease may be superimposed pneumonia or edema. Cardiomegaly and small bilateral pleural effusions. EKG shows new Atrial flutter with 2:1 A-V conduction and incomplete right bundle branch block.   Cardiology was consulted and patient was started on diltiazem 20 mg IV once, diltiazem drip 5 mg/h, started on heparin, azithromycin 500 mg p.o. once, ceftriaxone 1000 mg IV once. Past Medical History:     Past Medical History:   Diagnosis Date    COPD (chronic obstructive pulmonary disease) (HCC)     Depressive disorder, not elsewhere classified     Impotence of organic origin     Insomnia, unspecified     Myopathy, unspecified     [facility]inflammatory pulmonary fibrosis ([facility]     Silicosis ([facility]     Unspecified essential hypertension     Unspecified hypothyroidism         Past SurgicalHistory:     Past Surgical History:   Procedure Laterality Date    LUNG BIOPSY Left     LUNG BIOPSY Left     10 years ago        Medications Prior to Admission:        Prior to Admission medications    Medication Sig Start Date End Date Taking? Authorizing Provider   LORazepam (ATIVAN) 0.5 MG tablet Take 0.5 mg by mouth 2 times daily. Yes Historical Provider, MD   lisinopril (PRINIVIL;ZESTRIL) 20 MG tablet Take 20 mg by mouth daily   Yes Historical Provider, MD   albuterol (PROVENTIL) (2.5 MG/3ML) 0.083% nebulizer solution Take 3 mLs by nebulization every 6 hours as needed for Wheezing or Shortness of Breath 3/1/21  Yes Lalitha Watts MD   furosemide (LASIX) 20 MG tablet Take 1 tablet by mouth daily as needed (swelling) 3/1/21  Yes Lalitha Watts MD   ibuprofen (ADVIL;MOTRIN) 800 MG tablet Take 1 tablet by mouth 2 times daily 3/1/21  Yes Lalitha Watts MD   levothyroxine (SYNTHROID) 25 MCG tablet Take 1 tablet by mouth Daily 3/1/21  Yes Lalitha Watts MD   pantoprazole (PROTONIX) 40 MG tablet Take 1 tablet by mouth 2 times daily 3/1/21  Yes Lalitha Watts MD   dilTIAZem (CARDIZEM CD) 120 MG extended release capsule Take 1 capsule by mouth daily 12/4/20  Yes Chepe Patient, APRN - CNP   predniSONE (DELTASONE) 5 MG tablet Take 20 mg by mouth daily    Yes Historical Provider, MD   zolpidem (AMBIEN CR) 12.5 MG extended release tablet Take 12.5 mg by mouth nightly as needed for Sleep.    Yes Historical Provider, MD   [facility] Physical Exam  Constitutional:       Appearance: Normal appearance. HENT:      Mouth/Throat:      Mouth: Mucous membranes are dry. Eyes:      Conjunctiva/sclera: Conjunctivae normal.   Cardiovascular:      Rate and Rhythm: Normal rate and regular rhythm. Pulses: Normal pulses. Heart sounds: Normal heart sounds. Pulmonary:      Effort: Pulmonary effort is normal.      Breath sounds: Normal breath sounds. Comments: 8 L oxygen. 4 L via nasal cannula and 4 L nasal cannula by mouth. Abdominal:      Palpations: Abdomen is soft. Tenderness: There is no abdominal tenderness. Hernia: A hernia is present. Comments: Large reproducible umbilical hernia. Skin:     Coloration: Skin is pale. Comments: Lower extremities bilateral chronic venous stasis changes. Cyanotic color to palpation. Neurological:      Mental Status: He is alert and oriented to person, place, and time.    Psychiatric:         Mood and Affect: Mood normal.         Behavior: Behavior normal.         Investigations:     Laboratory Testing:  Recent Results (from the past 24 hour(s))   Troponin    Collection Time: 06/02/21  2:45 PM   Result Value Ref Range    Troponin, High Sensitivity 30 (H) 0 - 22 ng/L    Troponin T NOT REPORTED <0.03 ng/mL    Troponin Interp NOT REPORTED    C-Reactive Protein    Collection Time: 06/02/21  2:45 PM   Result Value Ref Range    CRP 33.0 (H) 0.0 - 5.0 mg/L   Procalcitonin    Collection Time: 06/02/21  2:45 PM   Result Value Ref Range    Procalcitonin 0.08 <0.09 ng/mL   Anti-Xa Unfract Heparin    Collection Time: 06/02/21  7:14 PM   Result Value Ref Range    Anti-XA Unfrac Heparin 0.75 (HH) 0.30 - 0.70 IU/L   Anti-Xa Unfract Heparin    Collection Time: 06/03/21  1:18 AM   Result Value Ref Range    Anti-XA Unfrac Heparin 0.75 (HH) 0.30 - 0.70 IU/L   Comprehensive Metabolic Panel w/ Reflex to MG    Collection Time: 06/03/21  5:50 AM   Result Value Ref Range    Glucose 129 (H) 70 - 99 mg/dL    BUN 13 8 - 23 mg/dL    CREATININE 0.86 0.70 - 1.20 mg/dL    Bun/Cre Ratio NOT REPORTED 9 - 20    Calcium 8.7 8.6 - 10.4 mg/dL    Sodium 140 135 - 144 mmol/L    Potassium 5.0 3.7 - 5.3 mmol/L    Chloride 100 98 - 107 mmol/L    CO2 35 (H) 20 - 31 mmol/L    Anion Gap 5 (L) 9 - 17 mmol/L    Alkaline Phosphatase 59 40 - 129 U/L    ALT 10 5 - 41 U/L    AST 14 <40 U/L    Total Bilirubin 0.29 (L) 0.3 - 1.2 mg/dL    Total Protein 6.1 (L) 6.4 - 8.3 g/dL    Albumin 3.3 (L) 3.5 - 5.2 g/dL    Albumin/Globulin Ratio NOT REPORTED 1.0 - 2.5    GFR Non-African American >60 >60 mL/min    GFR African American >60 >60 mL/min    GFR Comment          GFR Staging NOT REPORTED    CBC auto differential    Collection Time: 06/03/21  5:50 AM   Result Value Ref Range    WBC 8.1 3.5 - 11.0 k/uL    RBC 4.26 (L) 4.5 - 5.9 m/uL    Hemoglobin 12.6 (L) 13.5 - 17.5 g/dL    Hematocrit 39.8 (L) 41 - 53 %    MCV 93.5 80 - 100 fL    MCH 29.5 26 - 34 pg    MCHC 31.5 31 - 37 g/dL    RDW 15.8 (H) 11.5 - 14.9 %    Platelets 905 222 - 345 k/uL    MPV 8.7 6.0 - 12.0 fL    NRBC Automated NOT REPORTED per 100 WBC    Differential Type NOT REPORTED     Immature Granulocytes NOT REPORTED 0 %    Absolute Immature Granulocyte NOT REPORTED 0.00 - 0.30 k/uL    WBC Morphology NOT REPORTED     RBC Morphology NOT REPORTED     Platelet Estimate NOT REPORTED     Seg Neutrophils 81 (H) 36 - 66 %    Lymphocytes 9 (L) 24 - 44 %    Monocytes 10 (H) 1 - 7 %    Eosinophils % 0 0 - 4 %    Basophils 0 0 - 2 %    Segs Absolute 6.60 1.3 - 9.1 k/uL    Absolute Lymph # 0.70 (L) 1.0 - 4.8 k/uL    Absolute Mono # 0.80 0.1 - 1.3 k/uL    Absolute Eos # 0.00 0.0 - 0.4 k/uL    Basophils Absolute 0.00 0.0 - 0.2 k/uL   HEPARIN LEVEL/ANTI-XA    Collection Time: 06/03/21  5:50 AM   Result Value Ref Range    Anti-XA Unfrac Heparin 0.83 (HH) 0.30 - 0.70 IU/L   HEPARIN LEVEL/ANTI-XA    Collection Time: 06/03/21 12:50 PM   Result Value Ref Range    Anti-XA Unfrac Heparin 0.49 0.30 - 0.70 IU/L       Imaging/Diagnostics:  XR CHEST PORTABLE    Result Date: 6/2/2021  EXAMINATION: ONE XRAY VIEW OF THE CHEST 6/2/2021 9:24 am COMPARISON: 08/09/2020, 08/03/2020 HISTORY: ORDERING SYSTEM PROVIDED HISTORY: cough, shortness of breath TECHNOLOGIST PROVIDED HISTORY: cough, shortness of breath Reason for Exam: cough, shortness of breath Acuity: Unknown Type of Exam: Unknown FINDINGS: Dense bilateral airspace opacities superimposed on chronic interstitial lung change. Small bilateral pleural effusions. Cardiomegaly which appears similar to prior. No acute bony findings. Dense bilateral airspace opacities superimposed on the patient's underlying interstitial lung disease which may be due to superimposed pneumonia or edema. Cardiomegaly which appears grossly unchanged. Small bilateral pleural effusions. 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 ([facility] [C84.2] 61/18/3578    Hypothyroidism [E03.9]     Essential hypertension [I10]     [facility]monary fibrosis ([facility] [J84.10]        Plan:     Patient status Admit as inpatient in the  Progressive Unit/Step down    A flutter  -Patient received loading dose diltiazem  -Diltiazem 5 mg/h  -Continuous telemetry  -Cardiology on board    Acute on chronic respiratory failure with hypoxia COPD exacerbation  -Patient is on 8 L oxygen via nasal cannula and by mouth  -Chest x-ray shows possible pneumonia on top of underlying interstitial lung disease  -Ordered strep pneumo, mycoplasma, Legionella  -Received 1 dose azithromycin 500 mg PO  -Received 1 dose ceftriaxone 1000 mg IV   -Prednisone 20 mg p.o. daily  -Pulmonology consulted    Pulmonary fibrosis due to, silicosis  -Xopenex 4.17 mg every 4 hours as needed.     Essential

## 2021-06-02 NOTE — PROGRESS NOTES
Patient received from PCU. Oriented to ICU, call system. ICU monitor and SpO2 probe on patient. Call light within reach, bed down, locked, side rails up x2.

## 2021-06-02 NOTE — ED NOTES
followed up with Ms Caridad Catherine [facility]  [phone] Code S called at [phone].      Breda Eduardo Guillaume  06/02/21 1227

## 2021-06-02 NOTE — CONSULTS
Date:   6/2/2021  Patient name: Erma Woods  Date of admission:  6/2/2021 12:03 PM  MRN:   [id number]  YOB: 1955  PCP: Hugh Martel MD    Reason for Admission: Atrial flutter [facility]) [I48.92]    Cardiology consult: A flutter with RVR         Impression    A flutter with RVR  Episodes of nonsustained V. tach  Systolic and diastolic CHF ejection fraction 50%, moderate LVH  Severe pulmonary fibrosis, chronic hypoxia, oxygen dependent  Dyspnea on mild exertion  Myopathy on the steroids  Hypothyroidism  Essential hypertension  History of silicosis  Large umbilicus hernia     Hospitalization 8/3/2020 for multifocal PAC, atrial runs    ECG 6/2/2021  Flutter with 2-1 block heart rate 140, incomplete right bundle branch block/RVH    Chest x-ray 6/2/2021  Dense bilateral airspace opacities superimposed on patient's underlying interstitial lung disease  Cardiomegaly which grossly appears unchanged  Small bilateral pleural effusion    2D echo 8/4/2020  Normal LV size, ejection fraction 50%, mild to moderate LVH, leftward compression of the interventricular septum, dilated LA, no significant valvular abnormality    Lab work  21 with increasing shortness of breath,  Sodium 140, potassium 4.2, CO2 35, BUN 16, creatinine 0.92, glucose 123  proBNP 2964, high-sensitivity troponin 30  WBC 10.3, hemoglobin 14.3, platelets 259    History of present illness  61-year-old  male with a past medical history of severe interstitial lung disease, systolic and diastolic CHF, hypothyroidism, myopathy got hospitalized on 6/2/2021 with increasing shortness of breath, hypoxia, a flutter with RVR. His temperature was 97.4, respiratory rate more than 30, heart rate 140, blood pressure 117/96, oxygen saturation  94% on oxygen by nasal cannula.     Patient seen and examined  He was resting in upright position  Did not appear in any significant distress  ECG monitor a flutter heart rate above 130    Medications:   Scheduled Meds:   levothyroxine  25 mcg Oral Daily    pantoprazole  40 mg Oral BID    predniSONE  20 mg Oral Daily    sodium chloride flush  5-40 mL Intravenous 2 times per day    lisinopril  20 mg Oral Daily    DULoxetine  90 mg Oral Daily     Continuous Infusions:   heparin (PORCINE) Infusion 18 Units/kg/hr (06/02/21 1316)    dilTIAZem (CARDIZEM) 125 mg in dextrose 5% 125 mL infusion 5 mg/hr (06/02/21 1357)    sodium chloride       CBC:   Recent Labs     06/02/21  1239   WBC 10.3   HGB 14.3        BMP:    Recent Labs     06/02/21  1239      K 4.2   CL 96*   CO2 35*   BUN 16   CREATININE 0.92   GLUCOSE 123*     Hepatic:   Recent Labs     06/02/21  1239   AST 19   ALT 13   BILITOT 0.50   ALKPHOS 73     Troponin: No results for input(s): TROPONINI in the last 72 hours. BNP: No results for input(s): BNP in the last 72 hours. Lipids: No results for input(s): CHOL, HDL in the last 72 hours. Invalid input(s): LDLCALCU  INR:   Recent Labs     06/02/21  1239   INR 1.0       Objective:   Vitals: BP (!) 117/92   Pulse 138   Temp 97.5 °F (36.4 °C) (Oral)   Resp (!) 35   Ht 6' 7.5\" (2.019 m)   Wt 254 lb (115.2 kg)   SpO2 90%   BMI 28.26 kg/m²   General appearance: alert and cooperative with exam  HEENT: Head: Normal, normocephalic, atraumatic. Neck: supple, symmetrical, trachea midline  Lungs: This expansion is poor bilateral crackles noted  Heart: irregularly irregular rhythm  Abdomen: Soft bowel sounds present  Extremities: Homans sign is negative, no sign of DVT  Neurologic: Mental status: Alert, oriented, thought content appropriate    EKG: A flutter with RVR heart rate 140. ECHO: reviewed.    Ejection fraction: 50% mild to moderate LVH, dilated LA, 2D echo 8/4/2020    Assessment / Acute Cardiac Problems:   A flutter with RVR  Systolic and diastolic CHF ejection fraction 45 to 50%  Hypoxic respiratory failure  Severe interstitial lung disease  Hypothyroidism  Myopathy    Patient Active Problem List:

## 2021-06-02 NOTE — ED PROVIDER NOTES
EMERGENCY DEPARTMENT ENCOUNTER    Pt Name: Gamaliel Yan  MRN: [id number]  [place] 1955  Date of evaluation: 6/2/21  CHIEF COMPLAINT       Chief Complaint   Patient presents with    Tachycardia    Dizziness     Lightheadedness    Fatigue     HISTORY OF PRESENT ILLNESS     Shortness of Breath  Severity:  Moderate  Onset quality:  Gradual  Timing:  Constant  Progression:  Worsening  Chronicity:  New  Context comment:  Coughing up brown phlegm, after hitting back of throat with toothbrush  Relieved by:  Nothing  Worsened by:  Nothing  Ineffective treatments: home oxygen 8 LNC by mouth, he hold the canula between his teeth. Associated symptoms: cough and sputum production    Associated symptoms: no abdominal pain, no chest pain, no diaphoresis, no fever, no sore throat, no vomiting and no wheezing            REVIEW OF SYSTEMS     Review of Systems   Constitutional: Negative for diaphoresis and fever. HENT: Negative for sore throat. Respiratory: Positive for cough, sputum production and shortness of breath. Negative for wheezing. Cardiovascular: Negative for chest pain. Gastrointestinal: Negative for abdominal pain and vomiting. All other systems reviewed and are negative.     PASTMEDICAL HISTORY     Past Medical History:   Diagnosis Date    COPD (chronic obstructive pulmonary disease) (HCC)     Depressive disorder, not elsewhere classified     Impotence of organic origin     Insomnia, unspecified     Myopathy, unspecified     Postinflammatory pulmonary fibrosis ([address]     Silicosis ([address]     Unspecified essential hypertension     Unspecified hypothyroidism      Past Problem List  Patient Active Problem List   Diagnosis Code    Myopathy G72.9    Essential hypertension I10    Impotence of organic origin N52.9    Hypothyroidism E03.9    Insomnia G47.00    Pulmonary fibrosis ([address] J84.10    Bronchiectasis ([address] J47.9    Hernia, umbilical B95.9    Current chronic use of systemic steroids Z79.52    Pneumoconiosis ([facility] J64    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    Atrial flutter (HCC) I48.92     SURGICAL HISTORY       Past Surgical History:   Procedure Laterality Date    LUNG BIOPSY Left     LUNG BIOPSY Left     10 years ago     CURRENT MEDICATIONS       Previous Medications    ALBUTEROL (PROVENTIL) (2.5 MG/3ML) 0.083% NEBULIZER SOLUTION    Take 3 mLs by nebulization every 6 hours as needed for Wheezing or Shortness of Breath    DILTIAZEM (CARDIZEM CD) 120 MG EXTENDED RELEASE CAPSULE    Take 1 capsule by mouth daily    DILTIAZEM (TIAZAC) 120 MG EXTENDED RELEASE CAPSULE    Take 120 mg by mouth daily    DULOXETINE (CYMBALTA) 30 MG CAPSULE    Take 30 mg by mouth daily     FUROSEMIDE (LASIX) 20 MG TABLET    Take 1 tablet by mouth daily as needed (swelling)    IBUPROFEN (ADVIL;MOTRIN) 800 MG TABLET    Take 1 tablet by mouth 2 times daily    LEVOTHYROXINE (SYNTHROID) 25 MCG TABLET    Take 1 tablet by mouth Daily    LISINOPRIL (PRINIVIL;ZESTRIL) 10 MG TABLET    Take 1 tablet by mouth daily    PANTOPRAZOLE (PROTONIX) 40 MG TABLET    Take 1 tablet by mouth 2 times daily    PREDNISONE (DELTASONE) 20 MG TABLET    Take 20 mg by mouth daily    PROAIR  (90 BASE) MCG/ACT INHALER    Inhale 2 puffs into the lungs every 6 hours as needed for Wheezing     SODIUM CHLORIDE (ALTAMIST SPRAY) 0.65 % NASAL SPRAY    1 spray by Nasal route as needed for Congestion. ZOLPIDEM (AMBIEN CR) 12.5 MG EXTENDED RELEASE TABLET    Take 12.5 mg by mouth nightly as needed for Sleep. ALLERGIES     has No Known Allergies. FAMILY HISTORY     He indicated that his mother is . He indicated that his father is .  He indicated that the status of his sister is unknown. He indicated that the status of his other is unknown. SOCIAL HISTORY       Social History     Tobacco Use    Smoking status: Former Smoker     Packs/day: 0.25     Years: 15.00     Pack years: 3.75     Quit date: 2005     Years since quittin.2    Smokeless tobacco: Never Used   Substance Use Topics    Alcohol use: Yes     Alcohol/week: 0.0 standard drinks     Comment: every weekend- 6 Beers/Burbin    Drug use: No     PHYSICAL EXAM     INITIAL VITALS: /75   Pulse 113   Temp 97.4 °F (36.3 °C) (Oral)   Resp 23   Ht 6' 7.5\" (2.019 m)   Wt 254 lb (115.2 kg)   SpO2 95%   BMI 28.26 kg/m²    Physical Exam  Constitutional:       General: He is not in acute distress. Appearance: Normal appearance. He is well-developed. He is not diaphoretic. HENT:      Head: Normocephalic and atraumatic. Right Ear: External ear normal.      Left Ear: External ear normal.      Nose: Nose normal. No congestion. Mouth/Throat:      Pharynx: Oropharynx is clear. Comments: Very dry oral mucosa I suspect from the 8 liter oxygen he uses by mouth  Eyes:      General:         Right eye: No discharge. Left eye: No discharge. Conjunctiva/sclera: Conjunctivae normal.      Pupils: Pupils are equal, round, and reactive to light. Neck:      Trachea: No tracheal deviation. Cardiovascular:      Rate and Rhythm: Regular rhythm. Tachycardia present. Pulses: Normal pulses. Heart sounds: Normal heart sounds. Comments: Radial pulses 2+ BL  Pulmonary:      Effort: Pulmonary effort is normal. No respiratory distress. Breath sounds: Normal breath sounds. No stridor. No wheezing or rales. Abdominal:      Palpations: Abdomen is soft. Tenderness: There is no abdominal tenderness. There is no guarding or rebound. Comments: Reducible nontender ventral hernia   Musculoskeletal:         General: No tenderness or deformity. Normal range of motion. Cervical back: Normal range of motion and neck supple. Right lower leg: Edema present. Left lower leg: Edema present. Comments: Mild edema both ankles and feet   Skin:     General: Skin is warm and dry. Capillary Refill: Capillary refill takes less than 2 seconds. Findings: No erythema or rash. Neurological:      General: No focal deficit present. Mental Status: He is alert and oriented to person, place, and time. Cranial Nerves: No cranial nerve deficit. Coordination: Coordination normal.   Psychiatric:         Mood and Affect: Mood normal.         Behavior: Behavior normal.         Thought Content: Thought content normal.         Judgment: Judgment normal.         MEDICAL DECISION MAKING:       ED Course as of Jun 02 1403   Wed Jun 02, 2021   1218 Echo august 2020:    Left ventricle is normal in size. Estimated LV EF 50 %. Mild to moderate left ventricular hypertrophy. Leftward compression of inter-ventricular septum (\"D-sign\") indicating RV  pressure or volume overload. Right ventricular dilatation with reduced systolic function. Left atrial dilatation. Right atrial dilatation. Trivial aortic insufficiency. Thickened mitral valve leaflets. Mild mitral regurgitation. Mild tricuspid regurgitation. Normal right ventricular systolic pressure.   There is evidence of RV strain based on RV dilation/function and D-shaped  septum.    [WM]   56 DW Dr Trevizo Generous he rec cardizem gtt and midodrine 5 mg TID    [WM]   1300 Normal Hb  Starting heparin now    [WM]   36 Cxr resulted now, possible pneumonia vs CHF  Giving iv abx now  Aspirin also  Cardizem gtt    [WM]   56 DW Dr Prisca Meier and residents for admit    [WM]      ED Course User Index  [WM] Eric Jackson MD       [id number] Score for Atrial Fibrillation Stroke Risk   Risk   Factors  Component Value   C CHF yes 1   H HTN Yes 1   A2 Age >= 75 No,  (63 y.o.) 0   D DM No 0   S2 Prior Stroke/TIA No 0   V Vascular Disease No 0   A Age 74-69 No,  (63 y.o.) 0   Sc Sex male 0    [id number]  Score  2   Score last updated 6/2/21 12:25 PM EDT    Will anticoagulate also    First dose of cardizem improved HR from 140 to 113  Starting cardizem gtt 5 mg per hours  Do not suspect PE      CRITICAL CARE:   Due to the immediate potential for life-threatening deterioration due to atrial flutter with RVR , I spent 35 minutes providing critical care. This time is excluding time spent performing procedures. PROCEDURES:    Procedures    DIAGNOSTIC RESULTS   EKG:All EKG's are interpreted by the Emergency Department Physician who either signs or Co-signs this chart in the absence of a cardiologist.  Atrial flutter with RVR rate 139, no acute st elevations, some nonspecific st changes, qrs 102, qtc 499, nonspecific st changes      RADIOLOGY:All plain film, CT, MRI, and formal ultrasound images (except ED bedside ultrasound) are read by the radiologist, see reports below, unless otherwisenoted in MDM or here. XR CHEST PORTABLE   Final Result   Dense bilateral airspace opacities superimposed on the patient's underlying   interstitial lung disease which may be due to superimposed pneumonia or edema. Cardiomegaly which appears grossly unchanged. Small bilateral pleural effusions. LABS: All lab results were reviewed by myself, and all abnormals are listed below.   Labs Reviewed   CBC WITH AUTO DIFFERENTIAL - Abnormal; Notable for the following components:       Result Value    RDW 15.6 (*)     Seg Neutrophils 86 (*)     Lymphocytes 2 (*)     Absolute Lymph # 0.21 (*)     All other components within normal limits   COMPREHENSIVE METABOLIC PANEL - Abnormal; Notable for the following components:    Glucose 123 (*)     Chloride 96 (*)     CO2 35 (*)     All other components within normal limits   TROPONIN - Abnormal; Notable for the following components:    Troponin, High Sensitivity 38 (*)     All other components within normal limits BRAIN NATRIURETIC PEPTIDE - Abnormal; Notable for the following components:    Pro-BNP 2,964 (*)     All other components within normal limits   CULTURE, BLOOD 1   CULTURE, BLOOD 1   COVID-19, RAPID   LACTATE, SEPSIS   PROTIME-INR   MAGNESIUM   LACTATE, SEPSIS   TROPONIN   HEPARIN LEVEL/ANTI-XA   HEPARIN LEVEL/ANTI-XA       EMERGENCY DEPARTMENTCOURSE:         Vitals:    Vitals:    06/02/21 1300 06/02/21 1306 06/02/21 1315 06/02/21 1330   BP: (!) 122/92 (!) 112/90 116/82 127/75   Pulse: 140 140 112 113   Resp: (!) 32 27 29 23   Temp:       TempSrc:       SpO2: 99% 99% 90% 95%   Weight:       Height:           The patient was given the following medications while in the emergency department:  Orders Placed This Encounter   Medications    dilTIAZem injection 20 mg    heparin (porcine) injection 9,220 Units    heparin (porcine) injection 9,220 Units    heparin (porcine) injection 4,610 Units    heparin 25,000 units in dextrose 5% 250 mL (premix) infusion    cefTRIAXone (ROCEPHIN) 1000 mg IVPB in 50 mL D5W minibag     Order Specific Question:   Antimicrobial Indications     Answer:   Pneumonia (CAP)    azithromycin (ZITHROMAX) tablet 500 mg     Order Specific Question:   Antimicrobial Indications     Answer:   Pneumonia (CAP)    aspirin tablet 325 mg    dilTIAZem 125 mg in dextrose 5 % 125 mL infusion     CONSULTS:  IP CONSULT TO CARDIOLOGY  IP CONSULT TO INTERNAL MEDICINE  IP CONSULT TO CARDIOLOGY    FINAL IMPRESSION      1. Atrial flutter with rapid ventricular response ([address]    2. Pneumonia due to organism          DISPOSITION/PLAN   DISPOSITION Admitted 06/02/2021 01:40:21 PM      PATIENT REFERRED TO:  No follow-up provider specified.   DISCHARGE MEDICATIONS:  New Prescriptions    No medications on file     Nick Perez MD  Attending Emergency Physician                   Nick Perez MD  06/02/21 9859

## 2021-06-02 NOTE — PROGRESS NOTES
Medication History completed:    New medications: none    Medications discontinued: sodium chloride nasal spray    Changes to dosing:   Prednisone changed to 20 mg (four 5 mg tablets) daily  Lisinopril changed to 20 mg daily  Duloxetine changed to 90 mg (three 30 mg capsules) daily    Stated allergies: NKDA    Other pertinent information: Medications confirmed with Darryl.      Thank you,  Elham Malik, PharmD, BCPS  [phone]

## 2021-06-03 NOTE — CONSULTS
Current Facility-Administered Medications   Medication Dose Route Frequency Provider Last Rate Last Admin    dilTIAZem (CARDIZEM) tablet 60 mg  60 mg Oral BID Loyd Rebolledo MD        guaiFENesin-dextromethorphan (ROBITUSSIN DM) 100-10 MG/5ML syrup 5 mL  5 mL Oral Q4H PRN Harpreet Wiley MD        cefTRIAXone (ROCEPHIN) 1000 mg IVPB in 50 mL D5W minibag  1,000 mg Intravenous Q24H YOSSI Scott - CNP        azithromycin (ZITHROMAX) tablet 500 mg  500 mg Oral Daily YOSSI Mon - KODI        levalbuterol (XOPENEX) nebulization 0.63 mg  0.63 mg Nebulization Q4H While awake YOSSI Singh CNP        heparin (porcine) injection 4,610 Units  40 Units/kg Intravenous PRN Robbie Masters MD        heparin 25,000 units in dextrose 5% 250 mL (premix) infusion  18 Units/kg/hr Intravenous Continuous Lalitha Watts MD 16.1 mL/hr at 06/03/21 0643 14 Units/kg/hr at 06/03/21 0643    pantoprazole (PROTONIX) tablet 40 mg  40 mg Oral BID Lalitha Watts MD   40 mg at 06/03/21 0823    sodium chloride (OCEAN, BABY AYR) 0.65 % nasal spray 1 spray  1 spray Nasal PRN Lalitha Watts MD        sodium chloride flush 0.9 % injection 5-40 mL  5-40 mL Intravenous 2 times per day Twila Srivastava MD   10 mL at 06/03/21 0829    sodium chloride flush 0.9 % injection 5-40 mL  5-40 mL Intravenous PRN Twila Srivastava MD        0.9 % sodium chloride infusion  25 mL Intravenous PRN Twila Srivastava MD        ondansetron (ZOFRAN-ODT) disintegrating tablet 4 mg  4 mg Oral Q8H PRN Twila Srivastava MD        Or    ondansetron [facility]) injection 4 mg  4 mg Intravenous Q6H PRN Twila Srivastava MD        polyethylene glycol (GLYCOLAX) packet 17 g  17 g Oral Daily PRN Twila Srivastava MD        acetaminophen (TYLENOL) tablet 650 mg  650 mg Oral Q6H PRN Twila Srivastava MD        Or    acetaminophen (TYLENOL) suppository 650 mg  650 mg Rectal Q6H PRN Twila Srivastava MD        potassium chloride (KLOR-CON M) extended release tablet 40 mEq  40 mEq Oral PRN Christa Stoner MD        Or    potassium bicarb-citric acid (EFFER-K) effervescent tablet 40 mEq  40 mEq Oral PRN Christa Stoner MD        Or    potassium chloride 10 mEq/100 mL IVPB (Peripheral Line)  10 mEq Intravenous PRN Christa Stoner MD        magnesium sulfate 2,000 mg in dextrose 5 % 100 mL IVPB  2,000 mg Intravenous PRN Christa Stoner MD        DULoxetine (CYMBALTA) extended release capsule 90 mg  90 mg Oral Daily Aguilasunitha Costello MD   90 mg at 06/03/21 0387    LORazepam (ATIVAN) tablet 0.5 mg  0.5 mg Oral Nightly PRN Gladys Vázquez MD   0.5 mg at 06/02/21 2213    lisinopril (PRINIVIL;ZESTRIL) tablet 20 mg  20 mg Oral Daily Christa Stoner MD   20 mg at 06/03/21 8499    levothyroxine (SYNTHROID) tablet 25 mcg  25 mcg Oral Daily Christa Stoner MD   25 mcg at 06/03/21 0602    metoprolol (LOPRESSOR) injection 5 mg  5 mg Intravenous Q6H Willow Duarte MD   5 mg at 06/03/21 0602    predniSONE (DELTASONE) tablet 20 mg  20 mg Oral Daily Willow Duarte MD   20 mg at 06/03/21 0323    midodrine (PROAMATINE) tablet 5 mg  5 mg Oral TID Willow Durate MD   5 mg at 06/03/21 2652      PULMONARY  CONSULT NOTE      Date of Admission: 6/2/2021 12:03 PM    Reason for Consult: acut on chronic hypoxic RF    Referring Physician: DR Antonio Sanderson  PCP: Gladys Vázquez MD     History of Present Illness:     61 yo M with COPD, silicosis, Pulm fibrosis admitted with increasing SOB, noted to be in atrial flutter with RVR; seems to be doing slightly better    Problem:  Principal Problem:     PMH:   Past Medical History:   Diagnosis Date    COPD (chronic obstructive pulmonary disease) ([address]     Depressive disorder, not elsewhere classified     Impotence of organic origin     Insomnia, unspecified     Myopathy, unspecified     Postinflammatory pulmonary fibrosis ([address]     Silicosis ([address]     Unspecified essential hypertension     Unspecified hypothyroidism        PSH:   Past Surgical History:   Procedure Laterality Date    LUNG BIOPSY Left     LUNG BIOPSY Left     10 years ago       Allergies: No Known Allergies    Home Meds:  Medications Prior to Admission: LORazepam (ATIVAN) 0.5 MG tablet, Take 0.5 mg by mouth 2 times daily. lisinopril (PRINIVIL;ZESTRIL) 20 MG tablet, Take 20 mg by mouth daily  albuterol (PROVENTIL) (2.5 MG/3ML) 0.083% nebulizer solution, Take 3 mLs by nebulization every 6 hours as needed for Wheezing or Shortness of Breath  furosemide (LASIX) 20 MG tablet, Take 1 tablet by mouth daily as needed (swelling)  ibuprofen (ADVIL;MOTRIN) 800 MG tablet, Take 1 tablet by mouth 2 times daily  levothyroxine (SYNTHROID) 25 MCG tablet, Take 1 tablet by mouth Daily  pantoprazole (PROTONIX) 40 MG tablet, Take 1 tablet by mouth 2 times daily  dilTIAZem (CARDIZEM CD) 120 MG extended release capsule, Take 1 capsule by mouth daily  predniSONE (DELTASONE) 5 MG tablet, Take 20 mg by mouth daily   zolpidem (AMBIEN CR) 12.5 MG extended release tablet, Take 12.5 mg by mouth nightly as needed for Sleep. PROAIR  (90 BASE) MCG/ACT inhaler, Inhale 2 puffs into the lungs every 6 hours as needed for Wheezing   DULoxetine (CYMBALTA) 30 MG capsule, Take 90 mg by mouth daily     Social History:   Social History     Socioeconomic History    Marital status:      Spouse name: Not on file    Number of children: Not on file    Years of education: Not on file    Highest education level: Not on file   Occupational History    Occupation: disability   Tobacco Use    Smoking status: Former Smoker     Packs/day: 0.25     Years: 15.00     Pack years: 3.75     Quit date: 2005     Years since quittin.2    Smokeless tobacco: Never Used   Substance and Sexual Activity    Alcohol use:  Yes     Alcohol/week: 0.0 standard drinks     Comment: every weekend- 6 Beers/Burbin    Drug use: No    Sexual activity: Not on file   Other Topics Concern    Not on file   Social History bilaterally; breath sounds- vesicular; rhonchi- absent; rales/ crackles- absent  Heart: : regular rate and rhythm, S1, S2 normal, no murmur, click, rub or gallop  Abdomen: soft, non-tender; bowel sounds normal; no masses,  no organomegaly  Extremities: extremities normal, atraumatic, no cyanosis or edema  Skin: skin color, texture, turgor normal. No rashes or lesions  Neurologic: Grossly normal    [unfilled]    Recent labs, Imaging studies reviewed      Data ReviewCBC:   Recent Labs     06/02/21  1239 06/03/21  0550   WBC 10.3 8.1   RBC 4.71 4.26*   HGB 14.3 12.6*   HCT 45.0 39.8*    209     BMP:   Recent Labs     06/02/21  1239 06/03/21  0550   GLUCOSE 123* 129*    140   K 4.2 5.0   BUN 16 13   CREATININE 0.92 0.86   CALCIUM 9.3 8.7     ABGs: No results for input(s): PHART, PO2ART, QDD2JFU, SVJ0CLM, BEART, X2FENCXD, ILD3FBB in the last 72 hours.    PT/INR:  No results found for: PTINR      ASSESSMENT / PLAN:    Pulm Fibrosis/ Silicosis  Chronic hypoxic RF   O2  COPD - BD  A Flutter - HR control/ Heparin    Electronically signed by Rosas Sarabia MD on 06/03/21 at 12:27 PM.

## 2021-06-03 NOTE — PROGRESS NOTES
[facility]    PROGRESS NOTE             6/3/2021    8:22 AM    Name:   Noemy Baxter  MRN:     [id number]     Acct:      [de-identified]   Room:   2015/2015-01  IP Day:  1  Admit Date:  6/2/2021 12:03 PM    PCP:  Cesar Dai MD  Code Status:  Full Code    Subjective:     C/C:   Chief Complaint   Patient presents with    Tachycardia    Dizziness     Lightheadedness    Fatigue     Interval History Status: not changed. Acute event yesterday night patient had 25-beat run of V. Tach. Patient was transferred to ICU and started on Cardizem drip at 5 mics per hour Lopressor 5 mg per 6 hours. Patient seen and examined side. Nurse at bedside. Patient continues on 8 L oxygen nasal cannula by mouth. Continues to have productive cough with thick whitish phlegm. Monitor shows patient is in A. fib heart rate is stable between 80s and high 90s. Brief History:     The patient is a 60 y.o. Non-/non  male who presents withTachycardia, Dizziness (Lightheadedness), and Fatigue   and he is admitted to the hospital for the management of a flutter. 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 Flutter  and cardiomyopathy. Patient reports being fatigued, dizziness, and productive cough brown phlegm. Patient is on 6 L home oxygen but has required 8 L. Today he continues to have thick whitish sputum production. Patient denies fever, chills, chest pain, or palpitations. Patient denies any recent sick contacts.       In the ED, patient is afebrile tachypneic and tachycardic 8 L oxygen via nasal cannula 95% saturation. Labs bicarb 35, chloride 96, glucose 123. proBNP 2964 (baseline around 800), troponin 38>30 (baseline 15). CBC within normal limits.     Chest x-ray shows dense bilateral opacities superimposed with underlying interstitial lung disease may be superimposed pneumonia Insomnia, unspecified, Myopathy, unspecified, Postinflammatory pulmonary fibrosis ([address] Silicosis ([address] Unspecified essential hypertension, and Unspecified hypothyroidism. Social History:   reports that he quit smoking about 16 years ago. He has a 3.75 pack-year smoking history. He has never used smokeless tobacco. He reports current alcohol use. He reports that he does not use drugs. Family History:   Family History   Problem Relation Age of Onset    High Blood Pressure Mother     Cancer Mother     Stroke Mother     High Blood Pressure Father     Cancer Sister     Heart Disease Other         Family in general       Vitals:  BP (!) 131/91   Pulse 93   Temp 97.7 °F (36.5 °C) (Axillary)   Resp (!) 37   Ht 6' 7.5\" (2.019 m)   Wt 254 lb (115.2 kg)   SpO2 (!) 87%   BMI 28.26 kg/m²   Temp (24hrs), Av.5 °F (36.4 °C), Min:97.3 °F (36.3 °C), Max:97.7 °F (36.5 °C)    No results for input(s): POCGLU in the last 72 hours. I/O(24Hr): Intake/Output Summary (Last 24 hours) at 6/3/2021 7907  Last data filed at 2021 2257  Gross per 24 hour   Intake 445 ml   Output 200 ml   Net 245 ml       Labs:    [unfilled]    Lab Results   Component Value Date/Time    SPECIAL  2011 07:42 PM     RIGHT ANTECUBITAL Performed at [facility]     Lab Results   Component Value Date/Time    CULTURE NO GROWTH 6 DAYS 2011 07:42 PM    CULTURE  Performed at [facility] 2011 07:42 PM       [unfilled]    Radiology:    XR CHEST PORTABLE    Result Date: 2021  EXAMINATION: ONE XRAY VIEW OF THE CHEST 2021 9:24 am COMPARISON: 2020, 2020 HISTORY: ORDERING SYSTEM PROVIDED HISTORY: cough, shortness of breath TECHNOLOGIST PROVIDED HISTORY: cough, shortness of breath Reason for Exam: cough, shortness of breath Acuity: Unknown Type of Exam: Unknown FINDINGS: Dense bilateral airspace opacities superimposed on chronic interstitial lung change.   Small bilateral pleural COPD exacerbation  -Patient is on 8 L oxygen via nasal cannula and by mouth  -Chest x-ray shows possible pneumonia on top of underlying interstitial lung disease  -Ordered strep pneumo, mycoplasma, Legionella  -Received 1 dose azithromycin 500 mg PO  -Received 1 dose ceftriaxone 1000 mg IV   -Prednisone 20 mg p.o. daily  -Pulmonology consulted     Pulmonary fibrosis due to, silicosis  -Xopenex 8.50 mg every 4 hours as needed.     Essential hypertension  Lisinopril 20 mg p.o. daily     Hypothyroidism  Synthroid 25 MCG p.o. daily     Depression  Cymbalta 90 mg p.o. daily  Ambien 12.5 mg p.o. nightly as needed     Diet General  DVT prophylaxis: Patient is anticoagulated with heparin  GI prophylaxis Protonix 40 mg p.o. twice daily  PT/OT/SW    Karley Stephen MD  6/3/2021  8:22 AM

## 2021-06-03 NOTE — CARE COORDINATION
CASE MANAGEMENT NOTE:    Admission Date:  6/2/2021 Gerber Blackburn is a 60 y.o.  male    Admitted for : Atrial flutter ([facility] [address] [I48.92]    Met with:  Patient    PCP:  Dr. Marco Austin:  MMO and Medicare      Is patient alert and oriented at time of discussion:  Yes    Current Residence/ Living Arrangements:  independently at home with spouse and drives            Current Services PTA:  No    Does patient go to outpatient dialysis: No  If yes, location and chair time: n/a    Is patient agreeable to VNS: No    Freedom of choice provided:  Yes    List of [address] provided: No    VNS chosen:  NA    DME:  straight cane and scooter    Home Oxygen: Yes, 02 @ 7-8L NC or orally    Nebulizer: Yes    CPAP/BIPAP: Trilogy    Supplier: [employer]    Potential Assistance Needed: Follow for PO AC. Currently on heparin gtt. SNF needed: No    Freedom of choice and list provided: NA    Ph[facility]:  [facility] [place]       Does Patient want to use MEDS to BEDS? No    Is patient currently receiving oral anticoagulation therapy? No    Is the Patient an [facility] with Readmission Risk Score greater than 14%? No  If yes, pt needs a follow up appointment made within 7 days. Family Members/Caregivers that pt would like involved in their care:    Yes    If yes, list name here:  Barbie Posada    Transportation Provider:  Patient and Family             Discharge Plan:  Home without needs.                  Electronically signed by: Keeley Alonso RN on 6/3/2021 at 3:19 PM

## 2021-06-03 NOTE — PROGRESS NOTES
RN spoke to Lana Louise, he stated to stop Cardizem drip, give 150 bolus of Amio now and start PO Cardizem 60mg BID with first dose at 10am. Orders placed.

## 2021-06-03 NOTE — PLAN OF CARE
Problem: Cardiac:  Goal: Ability to maintain an adequate cardiac output will improve  Description: Ability to maintain an adequate cardiac output will improve  Outcome: Ongoing     Problem: Fluid Volume:  Goal: Ability to achieve and maintain adequate urine output will improve  Description: Ability to achieve and maintain adequate urine output will improve  Outcome: Ongoing     Problem: Respiratory:  Goal: Respiratory status will improve  Description: Respiratory status will improve  Outcome: Ongoing     Problem: Tissue Perfusion - Cardiopulmonary, Altered:  Goal: Hemodynamic stability will improve  Description: Hemodynamic stability will improve  Outcome: Ongoing     Problem: Falls - Risk of:  Goal: Will remain free from falls  Description: Will remain free from falls  Outcome: Ongoing     Problem: Infection:  Goal: Will remain free from infection  Description: Will remain free from infection  Outcome: Ongoing     Problem: Daily Care:  Goal: Daily care needs are met  Description: Daily care needs are met  Outcome: Ongoing     Problem: Pain:  Goal: Patient's pain/discomfort is manageable  Description: Patient's pain/discomfort is manageable  Outcome: Ongoing

## 2021-06-03 NOTE — PROGRESS NOTES
Date:   6/3/2021  Patient name: Brigette Bautista  Date of admission:  6/2/2021 12:03 PM  MRN:   [id number]  YOB: 1955  PCP: Jay Pater, MD    Reason for Admission: Atrial flutter [facility]) [I48.92]    Cardiology follow-up: A flutter with RVR       Impression     A flutter with RVR  Episodes of nonsustained V. tach  Systolic and diastolic CHF ejection fraction 50%, moderate LVH  Severe pulmonary fibrosis, chronic hypoxia, oxygen dependent  Dyspnea on mild exertion  Myopathy on the steroids  Hypothyroidism  Essential hypertension  History of silicosis  Large umbilicus hernia      Hospitalization 8/3/2020 for multifocal PAC, atrial runs     ECG 6/2/2021  Flutter with 2-1 block heart rate 140, incomplete right bundle branch block/RVH    Chest x-ray 6/2/2021  Dense bilateral airspace opacities superimposed on patient's underlying interstitial lung disease  Cardiomegaly which grossly appears unchanged  Small bilateral pleural effusion     2D echo 8/4/2020  Normal LV size, ejection fraction 50%, mild to moderate LVH, leftward compression of the interventricular septum, dilated LA, no significant valvular abnormality     Lab work  21 with increasing shortness of breath,  Sodium 140, potassium 4.2, CO2 35, BUN 16, creatinine 0.92, glucose 123  proBNP 2964, high-sensitivity troponin 30  WBC 10.3, hemoglobin 14.3, platelets 982     History of present illness  23-year-old  male with a past medical history of severe interstitial lung disease, systolic and diastolic CHF, hypothyroidism, myopathy got hospitalized on 6/2/2021 with increasing shortnessofbreath, hypoxia, a flutter with RVR. His temperature was 97.4, respiratory rate more than 30, heart rate 140, blood pressure 117/96, oxygen saturation  94% on oxygen by nasal cannula.     Current evaluation 6/3/2021  Patient seen and examined  He was resting in upright position having his breakfast  No chest pain no palpitation no diaphoresis no shortness of breath at rest  He was on oxygen by nasal cannula, oxygen saturation 96%  ECG monitoring flutter heart rate 70-80    Patient received IV amiodarone 150 mg bolus x 1    Medications:   Scheduled Meds:   dilTIAZem  60 mg Oral BID    cefTRIAXone (ROCEPHIN) IV  1,000 mg Intravenous Q24H    azithromycin  500 mg Oral Daily    pantoprazole  40 mg Oral BID    sodium chloride flush  5-40 mL Intravenous 2 times per day    DULoxetine  90 mg Oral Daily    lisinopril  20 mg Oral Daily    levothyroxine  25 mcg Oral Daily    metoprolol  5 mg Intravenous Q6H    predniSONE  20 mg Oral Daily    midodrine  5 mg Oral TID     Continuous Infusions:   heparin (PORCINE) Infusion 14 Units/kg/hr (06/03/21 0643)    sodium chloride       CBC:   Recent Labs     06/02/21  1239 06/03/21  0550   WBC 10.3 8.1   HGB 14.3 12.6*    209     BMP:    Recent Labs     06/02/21  1239 06/03/21  0550    140   K 4.2 5.0   CL 96* 100   CO2 35* 35*   BUN 16 13   CREATININE 0.92 0.86   GLUCOSE 123* 129*     Hepatic:   Recent Labs     06/02/21  1239 06/03/21  0550   AST 19 14   ALT 13 10   BILITOT 0.50 0.29*   ALKPHOS 73 59     Troponin: No results for input(s): TROPONINI in the last 72 hours. BNP: No results for input(s): BNP in the last 72 hours. Lipids: No results for input(s): CHOL, HDL in the last 72 hours. Invalid input(s): LDLCALCU  INR:   Recent Labs     06/02/21  1239   INR 1.0       Objective:   Vitals: /78   Pulse 88   Temp 97.7 °F (36.5 °C) (Axillary)   Resp 20   Ht 6' 7.5\" (2.019 m)   Wt 254 lb (115.2 kg)   SpO2 96%   BMI 28.26 kg/m²   General appearance: alert and cooperative with exam  HEENT: Head: Normal, normocephalic, atraumatic.   Neck: no JVD and supple, symmetrical, trachea midline  Lungs: Poor chest expansion bilateral crackles  Heart: irregularly irregular rhythm  Abdomen: Obese bowel sounds present  Extremities: No calf tenderness mild edema  Neurologic: Mental status: Alert, oriented, thought content appropriate    EKG: A flutter heart rate 70-80. ECHO: reviewed. 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  Cardizem 60 mg twice a day  Continue with IV Lopressor 5 mg every 6 hours   Continue current dose of midodrine  Continue IV heparin infusion   Continue ECG monitoring    Patient may need cardioversion    Electronically signed by Stefania Dickinson MD on 6/3/2021 at 11:32 AM

## 2021-06-04 NOTE — PROGRESS NOTES
denied any chest pain or palpitation  ECG monitor A.  flutter heart rate 98  Afebrile    Medications:   Scheduled Meds:   metoprolol tartrate  25 mg Oral BID    apixaban  5 mg Oral BID    dilTIAZem  60 mg Oral BID    cefTRIAXone (ROCEPHIN) IV  1,000 mg Intravenous Q24H    levalbuterol  0.63 mg Nebulization Q4H While awake    pantoprazole  40 mg Oral BID    sodium chloride flush  5-40 mL Intravenous 2 times per day    DULoxetine  90 mg Oral Daily    lisinopril  20 mg Oral Daily    levothyroxine  25 mcg Oral Daily    predniSONE  20 mg Oral Daily    midodrine  5 mg Oral TID     Continuous Infusions:   sodium chloride       CBC:   Recent Labs     06/02/21  1239 06/03/21  0550 06/04/21  0432   WBC 10.3 8.1 8.2   HGB 14.3 12.6* 12.3*    209 193     BMP:    Recent Labs     06/02/21  1239 06/03/21  0550 06/04/21  0432    140 143   K 4.2 5.0 4.0   CL 96* 100 103   CO2 35* 35* 34*   BUN 16 13 16   CREATININE 0.92 0.86 0.92   GLUCOSE 123* 129* 99     Hepatic:   Recent Labs     06/02/21  1239 06/03/21  0550 06/04/21  0432   AST 19 14 13   ALT 13 10 10   BILITOT 0.50 0.29* 0.21*   ALKPHOS 73 59 57     Troponin: No results for input(s): TROPONINI in the last 72 hours. BNP: No results for input(s): BNP in the last 72 hours. Lipids: No results for input(s): CHOL, HDL in the last 72 hours. Invalid input(s): LDLCALCU  INR:   Recent Labs     06/02/21  1239   INR 1.0       Objective:   Vitals: /83   Pulse 98   Temp 97.7 °F (36.5 °C) (Oral)   Resp 26   Ht 6' 7.5\" (2.019 m)   Wt 254 lb (115.2 kg)   SpO2 90%   BMI 28.26 kg/m²   General appearance: alert and cooperative with exam  HEENT: Head: Normal, normocephalic, atraumatic.   Neck: no JVD and supple, symmetrical, trachea midline  Lungs: Poor expansion bilateral Velcro crackles  Heart: irregularly irregular rhythm  Abdomen: Obese bowel sounds present  Extremities: Homans sign is negative, no sign of DVT  Neurologic: Mental status: Alert, oriented, thought content appropriate    EKG: A flutter heart rate 94. ECHO: reviewed.    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 ([address]     Major depressive disorder, recurrent, in full remission ([address]     Supraventricular tachycardia ([address]     Atrial fibrillation ([address]     Dog scratch     Leg wound, right, sequela     Neoplasm of uncertain behavior     Atrial flutter with rapid ventricular response (HCC)     Acute on chronic respiratory failure with hypoxia ([address]      Plan of Treatment:   Change metoprolol to carvedilol 6.25 mg twice a day  Start Eliquis 5 mg twice daily DC IV heparin  Continue with Cardizem 60 mg twice a day and midodrine 5 mg 3 times a day  Decrease lisinopril to 10 mg a day  Ambulate as tolerated  Okay to transfer to progressive unit    Electronically signed by Dina Cárdenas MD on 6/4/2021 at 11:15 AM

## 2021-06-04 NOTE — PROGRESS NOTES
right bundle branch block. Cardiology was consulted and patient was started on diltiazem 20 mg IV once, diltiazem drip 5 mg/h, started on heparin, azithromycin 500 mg p.o. once, ceftriaxone 1000 mg IV once. Review of Systems:     Review of Systems   Constitutional: Negative for fatigue and fever. HENT: Negative. Eyes: Negative. Respiratory: Positive for cough and shortness of breath. Negative for wheezing. Cardiovascular: Negative for chest pain, palpitations and leg swelling. Gastrointestinal: Negative for abdominal pain, constipation, diarrhea, nausea and vomiting. Genitourinary: Negative. Musculoskeletal: Negative. Skin: Negative. Neurological: Negative. Psychiatric/Behavioral: Negative. Medications:      Allergies:  No Known Allergies    Current Meds:   Scheduled Meds:    apixaban  5 mg Oral BID    acetylcysteine  600 mg Inhalation BID    [START ON 6/5/2021] lisinopril  10 mg Oral Daily    carvedilol  6.25 mg Oral BID WC    dilTIAZem  60 mg Oral BID    cefTRIAXone (ROCEPHIN) IV  1,000 mg Intravenous Q24H    levalbuterol  0.63 mg Nebulization Q4H While awake    pantoprazole  40 mg Oral BID    sodium chloride flush  5-40 mL Intravenous 2 times per day    DULoxetine  90 mg Oral Daily    levothyroxine  25 mcg Oral Daily    predniSONE  20 mg Oral Daily    midodrine  5 mg Oral TID     Continuous Infusions:    sodium chloride       PRN Meds: heparin (porcine), sodium chloride, sodium chloride flush, sodium chloride, ondansetron **OR** ondansetron, polyethylene glycol, acetaminophen **OR** acetaminophen, potassium chloride **OR** potassium alternative oral replacement **OR** potassium chloride, magnesium sulfate, LORazepam    Data:     Past Medical History:   has a past medical history of COPD (chronic obstructive pulmonary disease) ([facility] [address] Depressive disorder, not elsewhere classified, Impotence of organic origin, Insomnia, unspecified, Myopathy, unspecified, Postinflammatory pulmonary fibrosis ([facility] [address] Silicosis ([facility] [address] Unspecified essential hypertension, and Unspecified hypothyroidism. Social History:   reports that he quit smoking about 16 years ago. He has a 3.75 pack-year smoking history. He has never used smokeless tobacco. He reports current alcohol use. He reports that he does not use drugs. Family History:   Family History   Problem Relation Age of Onset    High Blood Pressure Mother     Cancer Mother     Stroke Mother     High Blood Pressure Father     Cancer Sister     Heart Disease Other         Family in general       Vitals:  /73   Pulse 95   Temp 97.9 °F (36.6 °C) (Axillary)   Resp 27   Ht 6' 7.5\" (2.019 m)   Wt 254 lb (115.2 kg)   SpO2 98%   BMI 28.26 kg/m²   Temp (24hrs), Av.6 °F (36.4 °C), Min:97.2 °F (36.2 °C), Max:98 °F (36.7 °C)    No results for input(s): POCGLU in the last 72 hours. I/O(24Hr): Intake/Output Summary (Last 24 hours) at 2021 1306  Last data filed at 2021 1202  Gross per 24 hour   Intake 113 ml   Output 550 ml   Net -437 ml       Labs:    [unfilled]    Lab Results   Component Value Date/Time    SPECIAL NOT REPORTED 2021 12:56 PM     Lab Results   Component Value Date/Time    CULTURE NO GROWTH 2 DAYS 2021 12:56 PM       [unfilled]    Radiology:    XR CHEST PORTABLE    Result Date: 2021  EXAMINATION: ONE XRAY VIEW OF THE CHEST 2021 9:24 am COMPARISON: 2020, 2020 HISTORY: ORDERING SYSTEM PROVIDED HISTORY: cough, shortness of breath TECHNOLOGIST PROVIDED HISTORY: cough, shortness of breath Reason for Exam: cough, shortness of breath Acuity: Unknown Type of Exam: Unknown FINDINGS: Dense bilateral airspace opacities superimposed on chronic interstitial lung change. Small bilateral pleural effusions. Cardiomegaly which appears similar to prior. No acute bony findings.      Dense bilateral airspace opacities superimposed on the patient's underlying interstitial lung disease which may be due to superimposed pneumonia or edema. Cardiomegaly which appears grossly unchanged. Small bilateral pleural effusions. Physical Examination:        Physical Exam  Constitutional:       Appearance: Normal appearance. HENT:      Mouth/Throat:      Mouth: Mucous membranes are dry. Cardiovascular:      Rate and Rhythm: Normal rate and regular rhythm. Pulses: Normal pulses. Heart sounds: Normal heart sounds. Pulmonary:      Breath sounds: Wheezing present. Comments: Crackles on left lower lung  8 L oxygen via nasal cannula by mouth  Abdominal:      Palpations: Abdomen is soft. Tenderness: There is no abdominal tenderness. Hernia: A hernia is present. Musculoskeletal:      Right lower leg: No edema. Left lower leg: No edema. Neurological:      Mental Status: He is alert. Mental status is at baseline. Psychiatric:         Mood and Affect: Mood normal.         Behavior: Behavior normal.           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  Cardizem 60 mg p.o. twice daily   -Continue IV Lopressor 5 mg every 6 hours   -Continue midodrine 5 mg p.o. 3 times daily   -Continue IV heparin infusion     Acute on chronic respiratory failure with hypoxia COPD exacerbation  -Patient is on 8 L oxygen via nasal cannula and by mouth  -Chest x-ray shows possible pneumonia on top of underlying interstitial lung disease  -Ordered strep pneumo, mycoplasma, Legionella  -Received 1 dose azithromycin 500 mg PO  -Received 1 dose ceftriaxone 1000 mg IV   -Prednisone 20 mg p.o. daily  -Pulmonology on board     Pulmonary fibrosis due to, silicosis  -Xopenex 9.71 mg every 4 hours as needed.     Essential hypertension  Lisinopril 20 mg p.o. daily     Hypothyroidism  Synthroid 25 MCG p.o. daily     Depression  Cymbalta 90 mg p.o. daily  Ambien 12.5 mg p.o. nightly as needed     Diet General  DVT prophylaxis: Patient is anticoagulated with heparin  GI prophylaxis Protonix 40 mg p.o. twice daily  PT/OT/SW    Xin Velasco MD  6/4/2021  1:06 PM     Attending Physician Statement    I have discussed the case of Alejandra Blum, including pertinent history and exam findings with the resident. I have seen and examined the patient and the key elements of the encounter have been performed by me. I agree with the assessment, plan, and orders as documented by the resident. Patient's clinical condition has improved but he is coughing up and of sputum. He will be given vest chest percussion. He is in atrial flutter which is being treated with diltiazem and beta-blockers.   He has a long history of silicosis and pulmonary hypertension  Electronically signed by Xin Velasco MD on 6/4/2021 at 1:06 PM

## 2021-06-04 NOTE — PLAN OF CARE
Problem: Cardiac:  Goal: Ability to maintain an adequate cardiac output will improve  Description: Ability to maintain an adequate cardiac output will improve  6/4/2021 0319 by Garrick Reno RN  Outcome: Ongoing  6/3/2021 1933 by Yomi Jay RN  Outcome: Ongoing  Goal: Hemodynamic stability will improve  Description: Hemodynamic stability will improve  6/4/2021 0319 by Garrick Reno RN  Outcome: Ongoing  6/3/2021 1933 by Yomi Jay RN  Outcome: Ongoing     Problem: Fluid Volume:  Goal: Ability to achieve and maintain adequate urine output will improve  Description: Ability to achieve and maintain adequate urine output will improve  6/4/2021 0319 by Garrick Reno RN  Outcome: Ongoing  6/3/2021 1933 by Yomi Jay RN  Outcome: Ongoing     Problem: Respiratory:  Goal: Respiratory status will improve  Description: Respiratory status will improve  6/4/2021 0319 by Garrick Reno RN  Outcome: Ongoing  6/3/2021 1933 by Yomi Jay RN  Outcome: Ongoing     Problem: Tissue Perfusion - Cardiopulmonary, Altered:  Goal: Hemodynamic stability will improve  Description: Hemodynamic stability will improve  6/4/2021 0319 by Garrick Reno RN  Outcome: Ongoing  6/3/2021 1933 by Yomi Jay RN  Outcome: Ongoing  Goal: Absence of angina  Description: Absence of angina  6/3/2021 1933 by Yomi Jay RN  Outcome: Ongoing     Problem: Falls - Risk of:  Goal: Will remain free from falls  Description: Will remain free from falls  6/4/2021 0319 by Garrick Reno RN  Outcome: Ongoing  6/3/2021 1933 by Yomi Jay RN  Outcome: Ongoing  Goal: Absence of physical injury  Description: Absence of physical injury  6/3/2021 1933 by Yomi Jay RN  Outcome: Ongoing     Problem: Safety:  Goal: Free from accidental physical injury  Description: Free from accidental physical injury  6/4/2021 0319 by Garrick Reno RN  Outcome: Ongoing  6/3/2021 1933 by Irish Cespedes Fernanda Lopez RN  Outcome: Ongoing  Goal: Free from intentional harm  Description: Free from intentional harm  6/3/2021 1933 by Violeta Cooney RN  Outcome: Ongoing     Problem: Daily Care:  Goal: Daily care needs are met  Description: Daily care needs are met  6/4/2021 0319 by Liberty Houston RN  Outcome: Ongoing  6/3/2021 1933 by Violeta Cooney RN  Outcome: Ongoing

## 2021-06-04 NOTE — PROGRESS NOTES
Report called to PCU nurse Radha Rm and pt transferred to new room 2124 in bed on oxygen. Tolerated well.  Care turned over

## 2021-06-04 NOTE — PROGRESS NOTES
PULMONARY PROGRESS NOTE:    REASON FOR VISIT:acute on chronic hypoxic RF    Interval History:    Shortness of Breath: ++  Cough: +  Sputum: no          Hemoptysis: no  Chest Pain: no  Fever: no                   Swelling Feet: +  Headache: no                                           Nausea, Emesis, Abdominal Pain: no  Diarrhea: no         Constipation: no    Events since last visit: Started on mucomyst and chest physiotherapy.      PAST MEDICAL HISTORY:      Scheduled Meds:   apixaban  5 mg Oral BID    acetylcysteine  600 mg Inhalation BID    [START ON 6/5/2021] lisinopril  10 mg Oral Daily    carvedilol  6.25 mg Oral BID WC    dilTIAZem  60 mg Oral BID    cefTRIAXone (ROCEPHIN) IV  1,000 mg Intravenous Q24H    levalbuterol  0.63 mg Nebulization Q4H While awake    pantoprazole  40 mg Oral BID    sodium chloride flush  5-40 mL Intravenous 2 times per day    DULoxetine  90 mg Oral Daily    levothyroxine  25 mcg Oral Daily    predniSONE  20 mg Oral Daily    midodrine  5 mg Oral TID     Continuous Infusions:   sodium chloride       PRN Meds:heparin (porcine), sodium chloride, sodium chloride flush, sodium chloride, ondansetron **OR** ondansetron, polyethylene glycol, acetaminophen **OR** acetaminophen, potassium chloride **OR** potassium alternative oral replacement **OR** potassium chloride, magnesium sulfate, LORazepam        PHYSICAL EXAMINATION:  /83   Pulse 98   Temp 97.7 °F (36.5 °C) (Oral)   Resp 30   Ht 6' 7.5\" (2.019 m)   Wt 254 lb (115.2 kg)   SpO2 90%   BMI 28.26 kg/m²     General : Awake, alert, oriented x 3, chest physiotherapy   Neck - supple, no lymphadenopathy, JVD not raised  Heart - a flutter; no additional sounds heard  Lungs - Air Entry- fair bilaterally; breath sounds : crackles, 100% on 8L  Abdomen - soft, no tenderness  Upper Extremities  - no cyanosis, mottling; edema : absent  Lower Extremities: no cyanosis, mottling; edema : absent    Current Laboratory, Radiologic, Microbiologic, and Diagnostic studies reviewed  Data ReviewCBC:   Recent Labs     06/02/21  1239 06/03/21  0550 06/04/21  0432   WBC 10.3 8.1 8.2   RBC 4.71 4.26* 3.83*   HGB 14.3 12.6* 12.3*   HCT 45.0 39.8* 38.2*    209 193     BMP:   Recent Labs     06/02/21  1239 06/03/21  0550 06/04/21  0432   GLUCOSE 123* 129* 99    140 143   K 4.2 5.0 4.0   BUN 16 13 16   CREATININE 0.92 0.86 0.92   CALCIUM 9.3 8.7 8.7     ABGs: No results for input(s): PHART, PO2ART, KTC4PKF, BLE7FDV, BEART, U9UCKZCO, KCZ8UMJ in the last 72 hours.    PT/INR:  No results found for: PTINR    ASSESSMENT / PLAN:  Pulm Fibrosis/ Silicosis  Chronic hypoxic RF   O2  COPD - BD, pulm hygiene  A Flutter - HR control/ transitioned to eliquis  Okay to transfer to progressive   Discussed with Dr. Carla Mina       Electronically signed by YOSSI Elder - KODI on 06/04/21

## 2021-06-05 NOTE — PROGRESS NOTES
oxygen by nasal cannula-7L, oxygen saturation 92%  Patient denied any chest pain, shortness of breath or palpitation  ECG monitor A.  flutter heart rate 68  Afebrile    Medications:   Scheduled Meds:   apixaban  5 mg Oral BID    acetylcysteine  600 mg Inhalation BID    lisinopril  10 mg Oral Daily    carvedilol  6.25 mg Oral BID WC    dilTIAZem  60 mg Oral BID    cefTRIAXone (ROCEPHIN) IV  1,000 mg Intravenous Q24H    levalbuterol  0.63 mg Nebulization Q4H While awake    pantoprazole  40 mg Oral BID    sodium chloride flush  5-40 mL Intravenous 2 times per day    DULoxetine  90 mg Oral Daily    levothyroxine  25 mcg Oral Daily    predniSONE  20 mg Oral Daily    midodrine  5 mg Oral TID     Continuous Infusions:   sodium chloride       CBC:   Recent Labs     06/03/21  0550 06/04/21  0432 06/05/21  0419   WBC 8.1 8.2 8.0   HGB 12.6* 12.3* 12.2*    193 201     BMP:    Recent Labs     06/03/21  0550 06/04/21  0432 06/05/21  0419    143 140   K 5.0 4.0 4.6    103 99   CO2 35* 34* 37*   BUN 13 16 17   CREATININE 0.86 0.92 1.07   GLUCOSE 129* 99 100*     Hepatic:   Recent Labs     06/03/21  0550 06/04/21  0432 06/05/21  0419   AST 14 13 13   ALT 10 10 12   BILITOT 0.29* 0.21* 0.19*   ALKPHOS 59 57 58     Troponin: No results for input(s): TROPONINI in the last 72 hours. BNP: No results for input(s): BNP in the last 72 hours. Lipids: No results for input(s): CHOL, HDL in the last 72 hours. Invalid input(s): LDLCALCU  INR:   Recent Labs     06/02/21  1239   INR 1.0       Objective:   Vitals: BP 97/79   Pulse 67   Temp 96.9 °F (36.1 °C) (Oral)   Resp 20   Ht 6' 7.5\" (2.019 m)   Wt 257 lb 0.9 oz (116.6 kg)   SpO2 (!) 88%   BMI 28.60 kg/m²   General appearance: alert and cooperative with exam  HEENT: Head: Normal, normocephalic, atraumatic.   Neck: no JVD and supple, symmetrical, trachea midline  Lungs: Poor expansion bilateral Velcro crackles  Heart: irregularly irregular rhythm  Abdomen: Obese bowel sounds present  Extremities: Homans sign is negative, no sign of DVT  Neurologic: Mental status: Alert, oriented, thought content appropriate    EKG: A flutter heart rate 94. ECHO: reviewed. 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 ([address]     Major depressive disorder, recurrent, in full remission ([address]     Supraventricular tachycardia ([address]     Atrial fibrillation ([address]     Dog scratch     Leg wound, right, sequela     Neoplasm of uncertain behavior     Atrial flutter with rapid ventricular response (HCC)     Acute on chronic respiratory failure with hypoxia ([address]      Plan of Treatment:   Carvedilol 6.25 mg twice a day  Eliquis 5 mg twice daily DC IV heparin  Cardizem 60 mg twice a day and midodrine 5 mg 3 times a day  Lisinopril to 10 mg a day  Ambulate as tolerated    Continue current medication. Discussed medication, lab work, assessment, with Dr. Dino Jessica.     Electronically signed by YOSSI Rodriges CNP on 6/5/2021 at 11:43 AM

## 2021-06-05 NOTE — PLAN OF CARE
Problem: Cardiac:  Goal: Ability to maintain an adequate cardiac output will improve  Description: Ability to maintain an adequate cardiac output will improve  Outcome: Ongoing     Problem: Respiratory:  Goal: Respiratory status will improve  Description: Respiratory status will improve  Outcome: Ongoing     Problem: Safety:  Goal: Free from accidental physical injury  Description: Free from accidental physical injury  Outcome: Ongoing  Note: Pt assessed as a fall risk this shift. Remains free from falls and accidental injury at this time. Fall precautions in place, including falling star sign and fall risk band on pt. Floor free from obstacles, and bed is locked and in lowest position. Adequate lighting provided. Will continue to monitor needs during hourly rounding, and reinforce education on use of call light. Problem: Skin Integrity:  Goal: Skin integrity will stabilize  Description: Skin integrity will stabilize  Outcome: Ongoing  Note: Skin assessment complete. Waffle mattress in place. [employer] applied PRN. Turned and repositioned per self. Area kept free from moisture. Proper nourishment and fluids encouraged, as appropriate. Will continue to monitor for additional needs and changes in skin breakdown.

## 2021-06-05 NOTE — PROGRESS NOTES
[address]    PROGRESS NOTE             6/5/2021    12:38 PM    Name:   Salazar Bernal  MRN:     [id number]     Acct:      [de-identified]   Room:   2124/2124-01  IP Day:  3  Admit Date:  6/2/2021 12:03 PM    PCP:  Maranda Sinha MD  Code Status:  Full Code    Subjective:     C/C:   Chief Complaint   Patient presents with    Tachycardia    Dizziness     Lightheadedness    Fatigue     Interval History Status: improved    Patient was seen and examined at bedside. Patient reports less productive cough this morning on 8L O2 via NC by mouth. Continues to have Afib rate controlled and on Heparin drip. Patient patient denies chest pain palpitations. Brief History:     The patient is a 60 y.o. Non-/non  male who presents withTachycardia, Dizziness (Lightheadedness), and Fatigue   and he is admitted to the hospital for the management of a flutter. 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 Flutter  and cardiomyopathy. Patient reports being fatigued, dizziness, and productive cough brown phlegm. Patient is on 6 L home oxygen but has required 8 L. Today he continues to have thick whitish sputum production. Patient denies fever, chills, chest pain, or palpitations. Patient denies any recent sick contacts.       In the ED, patient is afebrile tachypneic and tachycardic 8 L oxygen via nasal cannula 95% saturation. Labs bicarb 35, chloride 96, glucose 123. proBNP 2964 (baseline around 800), troponin 38>30 (baseline 15). CBC within normal limits. Chest x-ray shows dense bilateral opacities superimposed with underlying interstitial lung disease may be superimposed pneumonia or edema. Cardiomegaly and small bilateral pleural effusions. EKG shows new Atrial flutter with 2:1 A-V conduction and incomplete right bundle branch block.   Cardiology was consulted and patient hypothyroidism. Social History:   reports that he quit smoking about 16 years ago. He has a 3.75 pack-year smoking history. He has never used smokeless tobacco. He reports current alcohol use. He reports that he does not use drugs. Family History:   Family History   Problem Relation Age of Onset    High Blood Pressure Mother     Cancer Mother     Stroke Mother     High Blood Pressure Father     Cancer Sister     Heart Disease Other         Family in general       Vitals:  BP 97/65   Pulse 68   Temp 96.8 °F (36 °C) (Axillary)   Resp 18   Ht 6' 7.5\" (2.019 m)   Wt 257 lb 0.9 oz (116.6 kg)   SpO2 94%   BMI 28.60 kg/m²   Temp (24hrs), Av °F (36.1 °C), Min:94.6 °F (34.8 °C), Max:98.9 °F (37.2 °C)    No results for input(s): POCGLU in the last 72 hours. I/O(24Hr): Intake/Output Summary (Last 24 hours) at 2021 1238  Last data filed at 2021 0115  Gross per 24 hour   Intake 200 ml   Output 500 ml   Net -300 ml       Labs:    [unfilled]    Lab Results   Component Value Date/Time    SPECIAL NOT REPORTED 2021 12:56 PM     Lab Results   Component Value Date/Time    CULTURE NO GROWTH 3 DAYS 2021 12:56 PM       [unfilled]    Radiology:    XR CHEST PORTABLE    Result Date: 2021  EXAMINATION: ONE XRAY VIEW OF THE CHEST 2021 9:24 am COMPARISON: 2020, 2020 HISTORY: ORDERING SYSTEM PROVIDED HISTORY: cough, shortness of breath TECHNOLOGIST PROVIDED HISTORY: cough, shortness of breath Reason for Exam: cough, shortness of breath Acuity: Unknown Type of Exam: Unknown FINDINGS: Dense bilateral airspace opacities superimposed on chronic interstitial lung change. Small bilateral pleural effusions. Cardiomegaly which appears similar to prior. No acute bony findings. Dense bilateral airspace opacities superimposed on the patient's underlying interstitial lung disease which may be due to superimposed pneumonia or edema. Cardiomegaly which appears grossly unchanged. Small bilateral pleural effusions. Physical Examination:        Physical Exam  Constitutional:       Appearance: Normal appearance. HENT:      Mouth/Throat:      Mouth: Mucous membranes are dry. Cardiovascular:      Rate and Rhythm: Normal rate and regular rhythm. Pulses: Normal pulses. Heart sounds: Normal heart sounds. Pulmonary:      Breath sounds: Wheezing present. Comments: Crackles on left lower lung  8 L oxygen via nasal cannula by mouth  Abdominal:      Palpations: Abdomen is soft. Tenderness: There is no abdominal tenderness. Hernia: A hernia is present. Musculoskeletal:      Right lower leg: No edema. Left lower leg: No edema. Neurological:      Mental Status: He is alert. Mental status is at baseline. Psychiatric:         Mood and Affect: Mood normal.         Behavior: Behavior normal.           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  Tach.   -Continue on Cardizem drip and started Lopressor 5 mg every 6  -Per cardiology   -Discontinue Cardizem drip   -DC heparin drip and start Eliquis 5 mg twice daily.   -Start carvedilol 6.25 mg twice daily   -Decrease lisinopril to 10 mg p.o. daily   - Cardizem 60 twice daily     Acute on chronic respiratory failure with hypoxia COPD exacerbation  -Patient is on 8 L oxygen via nasal cannula and by mouth  -Chest x-ray shows possible pneumonia on top of underlying interstitial lung disease  -Ordered strep pneumo, mycoplasma, Legionella  -Received 1 dose azithromycin 500 mg PO  -Received 1 dose ceftriaxone 1000 mg IV   -Prednisone 20 mg p.o. daily  -Pulmonology consulted     Pulmonary fibrosis due to, silicosis  -Xopenex 8.24 mg every 4 hours as needed.     Essential hypertension  Lisinopril 20 mg p.o. daily     Hypothyroidism  Synthroid 25 MCG p.o. daily     Depression  Cymbalta 90 mg p.o. daily  Ambien 12.5 mg p.o. nightly as needed     Diet General  DVT prophylaxis: Patient is anticoagulated with heparin  GI prophylaxis Protonix 40 mg p.o. twice daily  PT/OT/SW    Christy Cali MD  6/5/2021  12:38 PM

## 2021-06-05 NOTE — PROGRESS NOTES
NONINVASIVE VENTILATION    PROVIDE OPTIMAL VENTILATION/ACCEPTABLE SPO2   IMPLEMENT NONINVASIVE VENTILATION PROTOCOL   MAINTAIN ACCEPTABLE SPO2   ASSESS SKIN INTEGRITY/BREAKDOWN SCORE   PATIENT EDUCATION AS NEEDED   BIPAP AS NEEDED        PROVIDE ADEQUATE OXYGENATION WITH ACCEPTABLE SP02/ABG'S    [x]  IDENTIFY APPROPRIATE OXYGEN THERAPY  [x]   MONITOR SP02/ABG'S AS NEEDED   [x]   PATIENT EDUCATION AS NEEDED    BRONCHOSPASM/BRONCHOCONSTRICTION     [x]         IMPROVE AERATION/BREATH SOUNDS  [x]   ADMINISTER BRONCHODILATOR THERAPY AS APPROPRIATE  [x]   ASSESS BREATH SOUNDS  [x]   IMPLEMENT AEROSOL/MDI PROTOCOL  [x]   PATIENT EDUCATION AS NEEDED    MOBILIZE SECRETIONS    [x]   ASSESS BREATH SOUNDS  [x]   ASSESS SPUTUM PRODUCTION  [x]   COUGH AND DEEP BREATHING  [x]  IMPLEMENT SECRETION MANAGEMENT PROTOCOL  [x]   PATIENT EDUCATION AS NEEDED    PT currently on 7lnc SpO2 95%. Pt given vest tx. Pt tolerated well.  Breath sounds pre tx diminished scattered wheezing with crackles

## 2021-06-05 NOTE — PROGRESS NOTES
[address] [facility].    PROGRESS NOTE             6/5/2021    11:14 AM    Name:   Elda Rouse  MRN:     [id number]     Acct:      [de-identified]   Room:   [facility]4/2124-01  IP Day:  3  Admit Date:  6/2/2021 12:03 PM    PCP:  Beth Mcclure MD  Code Status:  Full Code    Subjective:     C/C:   Chief Complaint   Patient presents with    Tachycardia    Dizziness     Lightheadedness    Fatigue     Interval History Status: significantly improved. Patient seen and examined bedside. No acute events overnight. VSS. O2 satting at 94% on 7L (baseline)   Episode of elevated BP this morning  Pt was asymptomatic, will continue monitor   HR has improved since med adjustment (on coreg, cardizem)   Pt has concerns due to sputum production, color has improved  Expectorating more   Discussed with patient benefit of bronchoscopy and encouraged him to discuss with his pulmonologist   Likely dc today       Brief History:     See HPI     Review of Systems:     Review of Systems   Constitutional: Negative for chills, fatigue and fever. HENT: Negative for sore throat. Eyes: Negative for visual disturbance. Respiratory: Negative for cough and shortness of breath. Sputum production      Cardiovascular: Negative for chest pain, palpitations and leg swelling. Gastrointestinal: Negative for abdominal pain, constipation, diarrhea, nausea and vomiting. Genitourinary: Negative for dysuria and frequency. Musculoskeletal: Negative for myalgias. Skin: Negative for pallor. Neurological: Negative for weakness, light-headedness and headaches. Medications:      Allergies:  No Known Allergies    Current Meds:   Scheduled Meds:    apixaban  5 mg Oral BID    acetylcysteine  600 mg Inhalation BID    lisinopril  10 mg Oral Daily    carvedilol  6.25 mg Oral BID WC    dilTIAZem  60 mg Oral BID    cefTRIAXone (ROCEPHIN) IV  1,000 mg Intravenous Q24H    levalbuterol  0.63 mg Nebulization Q4H While awake    pantoprazole  40 mg Oral BID    sodium chloride flush  5-40 mL Intravenous 2 times per day    DULoxetine  90 mg Oral Daily    levothyroxine  25 mcg Oral Daily    predniSONE  20 mg Oral Daily    midodrine  5 mg Oral TID     Continuous Infusions:    sodium chloride       PRN Meds: sodium chloride, sodium chloride flush, sodium chloride, ondansetron **OR** ondansetron, polyethylene glycol, acetaminophen **OR** acetaminophen, potassium chloride **OR** potassium alternative oral replacement **OR** potassium chloride, magnesium sulfate, LORazepam    Data:     Past Medical History:   has a past medical history of COPD (chronic obstructive pulmonary disease) ([facility] Depressive disorder, not elsewhere classified, Impotence of organic origin, Insomnia, unspecified, Myopathy, unspecified, Postinflammatory pulmonary fibrosis ([facility] Silicosis ([facility] Unspecified essential hypertension, and Unspecified hypothyroidism. Social History:   reports that he quit smoking about 16 years ago. He has a 3.75 pack-year smoking history. He has never used smokeless tobacco. He reports current alcohol use. He reports that he does not use drugs. Family History:   Family History   Problem Relation Age of Onset    High Blood Pressure Mother     Cancer Mother     Stroke Mother     High Blood Pressure Father     Cancer Sister     Heart Disease Other         Family in general       Vitals:  BP 97/79   Pulse 67   Temp 96.9 °F (36.1 °C) (Oral)   Resp 16   Ht 6' 7.5\" (2.019 m)   Wt 257 lb 0.9 oz (116.6 kg)   SpO2 94%   BMI 28.60 kg/m²   Temp (24hrs), Av.2 °F (36.2 °C), Min:94.6 °F (34.8 °C), Max:98.9 °F (37.2 °C)    No results for input(s): POCGLU in the last 72 hours. I/O(24Hr):     Intake/Output Summary (Last 24 hours) at 2021 1114  Last data filed at 2021 0115  Gross per 24 hour   Intake 313 ml   Output 900 ml   Net -587 ml             Lab Results Component Value Date/Time    SPECIAL NOT REPORTED 06/02/2021 12:56 PM     Lab Results   Component Value Date/Time    CULTURE NO GROWTH 3 DAYS 06/02/2021 12:56 PM       [unfilled]    Radiology:    XR CHEST PORTABLE    Result Date: 6/2/2021  EXAMINATION: ONE XRAY VIEW OF THE CHEST 6/2/2021 9:24 am COMPARISON: 08/09/2020, 08/03/2020 HISTORY: ORDERING SYSTEM PROVIDED HISTORY: cough, shortness of breath TECHNOLOGIST PROVIDED HISTORY: cough, shortness of breath Reason for Exam: cough, shortness of breath Acuity: Unknown Type of Exam: Unknown FINDINGS: Dense bilateral airspace opacities superimposed on chronic interstitial lung change. Small bilateral pleural effusions. Cardiomegaly which appears similar to prior. No acute bony findings. Dense bilateral airspace opacities superimposed on the patient's underlying interstitial lung disease which may be due to superimposed pneumonia or edema. Cardiomegaly which appears grossly unchanged. Small bilateral pleural effusions. Physical Examination:        Physical Exam  Vitals reviewed. Constitutional:       General: He is not in acute distress. HENT:      Mouth/Throat:      Mouth: Mucous membranes are moist.      Pharynx: Oropharynx is clear. Eyes:      Conjunctiva/sclera: Conjunctivae normal.   Cardiovascular:      Rate and Rhythm: Normal rate and regular rhythm. Heart sounds: Normal heart sounds. Pulmonary:      Effort: Pulmonary effort is normal.      Breath sounds: Normal breath sounds. Abdominal:      General: Bowel sounds are normal.      Palpations: Abdomen is soft. Musculoskeletal:         General: No tenderness. Cervical back: Neck supple. Right lower leg: No edema. Left lower leg: No edema. Skin:     General: Skin is warm. Neurological:      Mental Status: He is alert. Motor: No weakness.    Psychiatric:         Mood and Affect: Mood normal.           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 ([address] [L11.6] 43/23/6505    Hypothyroidism [E03.9]     Essential hypertension [I10]     Pulmonary fibrosis ([address] [J84.10]        Plan:        Acute on chronic hypoxic respiratory failure - improved   With superimposed p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   Back to baseline O2 (7L)   pneumo work up negative   On Rocoephin and Zithromax for increased sputum production and brown color  Can likely DC abx today   Would likely benefit from a bronchoscopy for mucus removal   Xopenex   Mucomyst   pulm vest per respiratory   Pulmonary consulted    New onset Atrial flutter with RVR   Coreg 6.25mg BID  Cardizem 60mg BID  Eliquis 5mg BID  Cardio consulted    Continue midodrine 5mg TID    Myopathy on steroids  Prednisone 20mg daily     Hypothyroidism  Synthroid 25mcg     HTN  Lisinopril 20mg daily     Dispo  Home   Likely today         Nicole Fink MD  6/5/2021  11:14 AM     I have discussed the care of Dev Garcia , including pertinent history and exam findings,    today with the resident. I have seen and examined the patient and the key elements of all parts of the encounter have been performed by me . I agree with the assessment, plan and orders as documented by the resident. 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 *        Overall  course ;                                   are improving over time.         New onset atrial flutter  On Eliquis  Rate controlled  Ordering

## 2021-06-05 NOTE — PROGRESS NOTES
PULMONARY PROGRESS NOTE:    REASON FOR VISIT:acute on chronic hypoxic RF, Silicosis  Interval History:    Shortness of Breath: ++  Cough: yes   Sputum: reports becoming productive        Hemoptysis: no  Chest Pain: no  Fever: no                   Swelling Feet: no  Headache: no                                           Nausea, Emesis, Abdominal Pain: no  Diarrhea: no         Constipation: no    Events since last visit: Patient inquiring about bronch. Reports he is not quite back to baseline yet.      PAST MEDICAL HISTORY:      Scheduled Meds:   apixaban  5 mg Oral BID    acetylcysteine  600 mg Inhalation BID    lisinopril  10 mg Oral Daily    carvedilol  6.25 mg Oral BID WC    dilTIAZem  60 mg Oral BID    cefTRIAXone (ROCEPHIN) IV  1,000 mg Intravenous Q24H    levalbuterol  0.63 mg Nebulization Q4H While awake    pantoprazole  40 mg Oral BID    sodium chloride flush  5-40 mL Intravenous 2 times per day    DULoxetine  90 mg Oral Daily    levothyroxine  25 mcg Oral Daily    predniSONE  20 mg Oral Daily    midodrine  5 mg Oral TID     Continuous Infusions:   sodium chloride       PRN Meds:sodium chloride, sodium chloride flush, sodium chloride, ondansetron **OR** ondansetron, polyethylene glycol, acetaminophen **OR** acetaminophen, potassium chloride **OR** potassium alternative oral replacement **OR** potassium chloride, magnesium sulfate, LORazepam        PHYSICAL EXAMINATION:  BP 97/79   Pulse 67   Temp 96.9 °F (36.1 °C) (Oral)   Resp 20   Ht 6' 7.5\" (2.019 m)   Wt 257 lb 0.9 oz (116.6 kg)   SpO2 (!) 88%   BMI 28.60 kg/m²     General : Awake, alert, oriented x 3   Neck - supple, no lymphadenopathy, JVD not raised  Heart - Irregular rhythm   Lungs - Air Entry- fair bilaterally; breath sounds : crackles, 88% on 7  Abdomen - soft, no tenderness  Upper Extremities  - no cyanosis, mottling; edema : absent  Lower Extremities: no cyanosis, mottling; edema : absent    Current Laboratory, Radiologic, Microbiologic, and Diagnostic studies reviewed  Data ReviewCBC:   Recent Labs     06/03/21  0550 06/04/21  0432 06/05/21  0419   WBC 8.1 8.2 8.0   RBC 4.26* 3.83* 4.04*   HGB 12.6* 12.3* 12.2*   HCT 39.8* 38.2* 38.8*    193 201     BMP:   Recent Labs     06/03/21  0550 06/04/21  0432 06/05/21  0419   GLUCOSE 129* 99 100*    143 140   K 5.0 4.0 4.6   BUN 13 16 17   CREATININE 0.86 0.92 1.07   CALCIUM 8.7 8.7 8.7     ABGs: No results for input(s): PHART, PO2ART, ITS7FAR, SBD6IBJ, BEART, E3RNHKRK, GJF7RZI in the last 72 hours.    PT/INR:  No results found for: PTINR    ASSESSMENT / PLAN:  Pulm Fibrosis/ Silicosis  Chronic hypoxic RF   O2  COPD - BD, pulm hygiene- add metaneb  A Flutter - HR control/ transitioned to eliquis  Okay to transfer to [facility]   Discussed with Dr. Ubaldo Nielsen       Electronically signed by YOSSI Trevizo - KODI on 06/05/21

## 2021-06-05 NOTE — DISCHARGE INSTR - COC
Continuity of Care Form    Patient Name: Manpreet Postal   :  1955  MRN:  [id number]    Admit date:  2021  Discharge date:  2021    Code Status Order: Full Code   Advance Directives:   Advance Care Flowsheet Documentation       Date/Time Healthcare Directive Type of Healthcare Directive Copy in [address] Agent's Name Healthcare Agent's Phone Number    21 1632  No, patient does not have an advance directive for healthcare treatment -- -- -- -- --            Admitting Physician:  Sri Duran MD  PCP: Bhavesh Bond MD    Discharging Nurse: [facility] Unit/Room#: 2124/2124-01  Discharging Unit Phone Number: [phone]    Emergency Contact:   Extended Emergency Contact Information  Primary Emergency Contact: Luis Aleman  Address: [address] Phone: [phone]  Mobile Phone: [phone]  Relation: Spouse  Secondary Emergency Contact: Daly Briggs, [address] [place] [address] Phone: [phone]  Mobile Phone: [phone]  Relation: Child    Past Surgical History:  Past Surgical History:   Procedure Laterality Date    LUNG BIOPSY Left     LUNG BIOPSY Left     10 years ago       Immunization History:   Immunization History   Administered Date(s) Administered    Influenza Vaccine, unspecified formulation 10/05/2016    Influenza Virus Vaccine 2009, 2011, 2014, 2015    Influenza Whole 2015    Influenza, Quadv, IM, PF (6 mo and older Fluzone, Flulaval, Fluarix, and 3 yrs and older Afluria) 10/05/2016, 2017, 10/12/2020    Pneumococcal Conjugate 13-valent ([id number]) 2014    Pneumococcal Polysaccharide (Ehjfjfomy25) 10/12/2020    Tdap (Boostrix, Adacel) 10/05/2016, 10/23/2019       Active Problems:  Patient Active Problem List   Diagnosis Code    Myopathy G72.9    Essential hypertension I10    Impotence of organic origin N52.9    Hypothyroidism E03.9    Insomnia G47.00    Pulmonary fibrosis (HCC) J84.10    Bronchiectasis (HCC) J47.9    Hernia, umbilical V30.3    Current chronic use of systemic steroids Z79.52    Pneumoconiosis ([facility] J64    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    Atrial flutter with rapid ventricular response (HCC) I48.92    Acute on chronic respiratory failure with hypoxia ([facility]) J96.21       Isolation/Infection:   Isolation            No Isolation          Patient Infection Status       Infection Onset Added Last Indicated Last Indicated By Review Planned Expiration Resolved Resolved By    None active    Resolved    COVID-19 Rule Out 06/02/21 06/02/21 06/02/21 COVID-19, Rapid (Ordered)   06/02/21 Rule-Out Test Resulted    COVID-19 Rule Out 08/03/20 08/03/20 08/03/20 COVID-19 (Ordered)   08/03/20 Rule-Out Test Resulted            Nurse Assessment:  Last Vital Signs: BP 97/65   Pulse 68   Temp 96.8 °F (36 °C) (Axillary)   Resp 18   Ht 6' 7.5\" (2.019 m)   Wt 257 lb 0.9 oz (116.6 kg)   SpO2 94%   BMI 28.60 kg/m²     Last documented pain score (0-10 scale): Pain Level: 0  Last Weight:   Wt Readings from Last 1 Encounters:   06/05/21 257 lb 0.9 oz (116.6 kg)     Mental Status:  oriented    IV Access:  - None    Nursing Mobility/ADLs:  Walking   Independent  Transfer  Independent  Bathing  Independent  Dressing  Independent  Toileting  Independent  Feeding  Independent  Med Admin  Independent  Med Delivery   whole    Wound Care Documentation and Therapy:        Elimination:  Continence:   · Bowel: Yes  · Bladder: Yes  Urinary Catheter: None   Colostomy/Ileostomy/Ileal Conduit: No         Safety Concerns:      At Risk for Falls    Impairments/Disabilities:      None    Nutrition Therapy:  Current Nutrition Therapy:   - Oral Diet:  Low Sodium (3-4gm)    Routes of Feeding: Oral  Liquids: No Restrictions  Daily Fluid Restriction: no  Last Modified Barium Swallow with Video (Video Swallowing Test): not done    Treatments at the Time of Hospital Discharge:   Respiratory Treatments: See discharge orders  Oxygen Therapy:  is on oxygen at 7-8L L/min per nasal cannula. Ventilator:    - No ventilator support    Rehab Therapies: None  Weight Bearing Status/Restrictions: No weight bearing restirctions  Other Medical Equipment (for information only, NOT a DME order):  Cane, motor scooter, nebulizer and home oxygen  Other Treatments: Skilled Nursing Assessment; Medication Education and Monitor    Home health care agency's  to evaluate patient two weeks prior to discharge from home health to determine post-discharge services. Patient's personal belongings (please select all that are sent with patient):      RN SIGNATURE:  Electronically signed by Filiberto Jean RN on 6/6/21 at 9:45 AM EDT    CASE MANAGEMENT/SOCIAL WORK SECTION    Inpatient Status Date: 6/2/2021    Readmission Risk Assessment Score:  Readmission Risk              Risk of Unplanned Readmission:  10           Discharging to Facility/ [address]  Phone: [phone]  Fax [phone]      / signature: Electronically signed by Filiberto Jean RN on 6/5/21 at 1:53 PM EDT    PHYSICIAN SECTION    Prognosis: Good    Condition at Discharge: Stable    Rehab Potential (if transferring to Rehab): Good    Recommended Labs or Other Treatments After Discharge:     Physician Certification: I certify the above information and transfer of Mary Rivera  is necessary for the continuing treatment of the diagnosis listed and that he requires Home Care for less 30 days.      Update Admission H&P: No change in H&P    PHYSICIAN SIGNATURE:  Electronically signed by John Meza MD on 6/6/21 at 9:26 AM EDT

## 2021-06-05 NOTE — CARE COORDINATION
ONGOING DISCHARGE PLAN:    Patient is alert and oriented x4. Spoke with patient regarding discharge plan and patient confirms that plan is still home with spouse. Spouse and daughter at the bedsdie. Agreeable to  [address] [place] of choice and list were provided. Referral has been made. Writer verified with patient that he was given free 30 day and copay card for Eliquis and he was. Active order for IV rocephin. Will continue to follow for additional discharge needs. Electronically signed by Kelley Woods RN on 6/5/2021 at 1:49 PM      Update 1510- Writer spoke with Oly Rouse from [address] and they are able to accept this patient- will be able to start care Tuesday. Dedra made aware patient anticipated to discharge this weekend and that all paperwork will be faxed to their office upon discharge.     Electronically signed by Kelley Woods RN on 6/5/2021 at 3:11 PM

## 2021-06-06 NOTE — PROGRESS NOTES
Patient informed RN that he does not have anyone to bring oxygen for his ride home.  RN notified d/c planner who called [facility] to bring tanks to the hospital.

## 2021-06-06 NOTE — DISCHARGE SUMMARY
[facility] Internal Medicine    Discharge Summary     Patient ID: Kacey Mishra  :  1955   MRN: [id number]     ACCOUNT:  [de-identified]   Patient's PCP: Lovely Carrel, MD  Admit Date: 2021   Discharge Date: 2021    Length of Stay: 4  Code Status:  Full Code  Admitting Physician: Hilda Ortega MD  Discharge Physician: Lovely Carrel, MD     Active Discharge Diagnoses:     Primary Problem  Acute on chronic respiratory failure with hypoxia [facility])      [facility] Problems    Diagnosis Date Noted    Atrial flutter with rapid ventricular response ([address] [I48.92] 2021    Acute on chronic respiratory failure with hypoxia ([address] [J96.21] 2021    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 Flutter  and cardiomyopathy.  Patient reported being fatigued, dizziness, and productive cough brown phlegm.  Patient is on 6 L home oxygen but required 8L upon arrival to the ED. Patient was treated with Rocephin, Steroids for acute on chronic respiratory failure. Did develop new onset atrial flutter with RVR, cardiology was consulted. Patient was started on cardizem drip which was then switched to PO Cardizem. Was started on Coreg and Eliquis. TSH was normal. Did receive chest physiotherapy and breathing treatments during admission.  Currently patient is requiring his baseline oxygen requirements, and sputum production has improved. Patient feels back to baseline. Patient getting discharged on p.o. antibiotics        Significant therapeutic interventions:     Significant Diagnostic Studies:   Labs / Micro:        ,     Radiology:    XR CHEST PORTABLE    Result Date: 6/2/2021  EXAMINATION: ONE XRAY VIEW OF THE CHEST 6/2/2021 9:24 am COMPARISON: 08/09/2020, 08/03/2020 HISTORY: ORDERING SYSTEM PROVIDED HISTORY: cough, shortness of breath TECHNOLOGIST PROVIDED HISTORY: cough, shortness of breath Reason for Exam: cough, shortness of breath Acuity: Unknown Type of Exam: Unknown FINDINGS: Dense bilateral airspace opacities superimposed on chronic interstitial lung change. Small bilateral pleural effusions. Cardiomegaly which appears similar to prior. No acute bony findings. Dense bilateral airspace opacities superimposed on the patient's underlying interstitial lung disease which may be due to superimposed pneumonia or edema. Cardiomegaly which appears grossly unchanged. Small bilateral pleural effusions. Consultations:    Consults:     Final Specialist Recommendations/Findings:   IP CONSULT TO CARDIOLOGY  IP CONSULT TO INTERNAL MEDICINE  IP CONSULT TO PULMONOLOGY  IP CONSULT TO CARDIOLOGY  IP CONSULT TO RESPIRATORY CARE      The patient was seen and examined on day of discharge and this discharge summary is in conjunction with any daily progress note from day of discharge.     Discharge plan:     Disposition: Home    Physician Follow Up:     Adriel Dorsey MD  [address]  [phone]    Go on 6/10/2021  [facility] Follow Up @ 2:30pm       Requiring Further Evaluation/Follow Up POST HOSPITALIZATION/Incidental Findings:    Diet: cardiac diet    Activity: As tolerated    Instructions to Patient:     Discharge Medications:      Medication List      START taking these medications    acetylcysteine 20 % nebulizer solution  Commonly known as: MUCOMYST  Inhale 3 mLs into the lungs 2 times daily for 10 days     apixaban 5 MG Tabs tablet  Commonly known as: ELIQUIS  Take 1 tablet by mouth 2 times daily     carvedilol 6.25 MG tablet  Commonly known as: COREG  Take 1 tablet by mouth 2 times daily (with meals)     dilTIAZem 60 MG tablet  Commonly known as: CARDIZEM  Take 1 tablet by mouth 2 times daily     levoFLOXacin 500 MG tablet  Commonly known as: LEVAQUIN  Take 1 tablet by mouth daily for 5 days        CONTINUE taking these medications    Ambien CR 12.5 MG extended release tablet  Generic drug: zolpidem     DULoxetine 30 MG extended release capsule  Commonly known as: CYMBALTA     furosemide 20 MG tablet  Commonly known as: LASIX  Take 1 tablet by mouth daily as needed (swelling)     ibuprofen 800 MG tablet  Commonly known as: ADVIL;MOTRIN  Take 1 tablet by mouth 2 times daily     levothyroxine 25 MCG tablet  Commonly known as: SYNTHROID  Take 1 tablet by mouth Daily     LORazepam 0.5 MG tablet  Commonly known as: ATIVAN     pantoprazole 40 MG tablet  Commonly known as: PROTONIX  Take 1 tablet by mouth 2 times daily     predniSONE 5 MG tablet  Commonly known as: DELTASONE     * ProAir  (90 Base) MCG/ACT inhaler  Generic drug: albuterol sulfate HFA     * albuterol (2.5 MG/3ML) 0.083% nebulizer solution  Commonly known as: PROVENTIL  Take 3 mLs by nebulization every 6 hours as needed for Wheezing or Shortness of Breath         * This list has 2 medication(s) that are the same as other medications prescribed for you. Read the directions carefully, and ask your doctor or other care provider to review them with you.             STOP taking these medications    dilTIAZem 120 MG extended release capsule  Commonly known as: CARDIZEM CD     lisinopril 20 MG tablet  Commonly known as: PRINIVIL;ZESTRIL           Where to Get Your Medications      These medications were sent to [facility] [address] [place], [place] [address] [phone] - F [phone] [address] [facility] [address] Phone: [phone]   · acetylcysteine 20 % nebulizer solution  · apixaban 5 MG Tabs tablet  · carvedilol 6.25 MG tablet  · dilTIAZem 60 MG tablet  · levoFLOXacin 500 MG tablet         Time Spent on discharge is  35 mins in patient examination, evaluation, counseling as well as medication reconciliation, prescriptions for required medications, discharge plan and follow up. Electronically signed by   Cesar Dai MD  6/6/2021  9:19 AM      Thank you Dr. Cesar Dai MD for the opportunity to be involved in this patient's care.

## 2021-06-06 NOTE — PROGRESS NOTES
PULMONARY PROGRESS NOTE:    REASON FOR VISIT:acute on chronic hypoxic RF, Silicosis  Interval History:    Shortness of Breath: baseline  Cough: no  Sputum: becoming productive     Hemoptysis: no  Chest Pain: no  Fever: no                   Swelling Feet: no  Headache: no                                           Nausea, Emesis, Abdominal Pain: no  Diarrhea: no         Constipation: no    Events since last visit: Feels baseline.      PAST MEDICAL HISTORY:      Scheduled Meds:   apixaban  5 mg Oral BID    acetylcysteine  600 mg Inhalation BID    lisinopril  10 mg Oral Daily    carvedilol  6.25 mg Oral BID WC    dilTIAZem  60 mg Oral BID    cefTRIAXone (ROCEPHIN) IV  1,000 mg Intravenous Q24H    levalbuterol  0.63 mg Nebulization Q4H While awake    pantoprazole  40 mg Oral BID    sodium chloride flush  5-40 mL Intravenous 2 times per day    DULoxetine  90 mg Oral Daily    levothyroxine  25 mcg Oral Daily    predniSONE  20 mg Oral Daily    midodrine  5 mg Oral TID     Continuous Infusions:   sodium chloride 25 mL (06/05/21 1658)     PRN Meds:furosemide, sodium chloride, sodium chloride flush, sodium chloride, ondansetron **OR** ondansetron, polyethylene glycol, acetaminophen **OR** acetaminophen, potassium chloride **OR** potassium alternative oral replacement **OR** potassium chloride, magnesium sulfate, LORazepam        PHYSICAL EXAMINATION:  /74   Pulse 63   Temp 97.2 °F (36.2 °C) (Axillary)   Resp 18   Ht 6' 7.5\" (2.019 m)   Wt 254 lb 6.6 oz (115.4 kg)   SpO2 90%   BMI 28.30 kg/m²     General : Awake, alert, oriented x 3   Neck - supple, no lymphadenopathy, JVD not raised  Heart - Irregular rhythm; no additional sounds heard  Lungs - Air Entry- fair bilaterally; breath sounds : vesicular;   rales/crackles - absent, 90% on 7L  Abdomen - soft, no tenderness  Upper Extremities  - no cyanosis, mottling; edema : absent  Lower Extremities: no cyanosis, mottling; edema : absent    Current Laboratory, Radiologic, Microbiologic, and Diagnostic studies reviewed  Data ReviewCBC:   Recent Labs     06/04/21  0432 06/05/21 0419 06/06/21  0422   WBC 8.2 8.0 8.3   RBC 3.83* 4.04* 4.06*   HGB 12.3* 12.2* 12.2*   HCT 38.2* 38.8* 39.0*    201 187     BMP:   Recent Labs     06/04/21  0432 06/05/21 0419 06/06/21  0422   GLUCOSE 99 100* 103*    140 147*   K 4.0 4.6 4.9   BUN 16 17 17   CREATININE 0.92 1.07 1.10   CALCIUM 8.7 8.7 8.4*     ABGs: No results for input(s): PHART, PO2ART, EYF2OAJ, QIR6OHG, BEART, O6BFCSOB, DHM7ARF in the last 72 hours. PT/INR:  No results found for: PTINR    ASSESSMENT / PLAN:    Pulm Fibrosis/ Silicosis  Chronic hypoxic RF   O2  COPD - BD, pulm hygiene- add metaneb  A Flutter - HR control/ transitioned to eliquis  No objection to discharge- will continue mucomyst at home.  Follow up in office 2 weeks  Discussed with Dr. Remy Cowan    Electronically signed by YOSSI Rivera CNP on 06/06/21

## 2021-06-06 NOTE — PLAN OF CARE
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 Flutter  and cardiomyopathy. Patient reported being fatigued, dizziness, and productive cough brown phlegm. Patient is on 6 L home oxygen but required 8L upon arrival to the ED. Patient was treated with Rocephin, Steroids for acute on chronic respiratory failure. Did develop new onset atrial flutter with RVR, cardiology was consulted. Patient was started on cardizem drip which was then switched to PO Cardizem. Was started on Coreg and Eliquis. TSH was normal. Did receive chest physiotherapy and breathing treatments during admission. Currently patient is requiring his baseline oxygen requirements, and sputum production has improved. Patient feels back to baseline. Plan   Switch to PO antibiotics  Follow up outpatient with pulmonology Dr. Almazan Axon  Likely discharge    Resident team will sign off.     Electronically signed by Isaura Haywood MD on 6/6/2021 at 7:53 AM

## 2021-06-06 NOTE — PLAN OF CARE
Problem: Pain:  Goal: Patient's pain/discomfort is manageable  Description: Patient's pain/discomfort is manageable  Outcome: Met This Shift     Problem: Cardiac:  Goal: Ability to maintain an adequate cardiac output will improve  Description: Ability to maintain an adequate cardiac output will improve  Outcome: Ongoing     Problem: Respiratory:  Goal: Respiratory status will improve  Description: Respiratory status will improve  Outcome: Ongoing     Problem: Falls - Risk of:  Goal: Will remain free from falls  Description: Will remain free from falls  Outcome: Ongoing  Note: Pt assessed as a fall risk this shift. Remains free from falls and accidental injury at this time. Fall precautions in place, including falling star sign and fall risk band on pt. Floor free from obstacles, and bed is locked and in lowest position. Adequate lighting provided. Will continue to monitor needs during hourly rounding, and reinforce education on use of call light.        Problem: Safety:  Goal: Free from accidental physical injury  Description: Free from accidental physical injury  Outcome: Ongoing

## 2021-06-06 NOTE — CARE COORDINATION
12.5 mg   Take 12.5 mg by mouth nightly as needed for Sleep.        PROAIR  (90 BASE) MCG/ACT inhaler 2 puffs   Inhale 2 puffs into the lungs every 6 hours as needed for Wheezing        DULoxetine (CYMBALTA) 30 MG capsule 90 mg   Take 90 mg by mouth daily    Refills: 0            STOP taking these previous medications    Medication Dose Reason for Stopping Comments   (STOP TAKING) lisinopril (PRINIVIL;ZESTRIL) 20 MG tablet 20 mg           (STOP TAKING) dilTIAZem (CARDIZEM CD) 120 MG extended release capsule 120 mg           (STOP TAKING) sodium chloride (ALTAMIST SPRAY) 0.65 % nasal spray 1 spray       Continuity of Care Form    Patient Name: Remigio Mccallum   :  1955  MRN:  [id number]    Admit date:  2021  Discharge date:  2021    Code Status Order: Full Code   Advance Directives:   [facility] Documentation       Date/Time Healthcare Directive Type of Healthcare Directive Copy in [address] [place] [address] [address] Agent's Name Healthcare Agent's Phone Number    21 1632  No, patient does not have an advance directive for healthcare treatment -- -- -- -- --            Admitting Physician:  Donald Foley MD  PCP: Starla Castanon MD    Discharging Nurse: [facility] Unit/Room#: 2124/2124-01  Discharging Unit Phone Number: [phone]    Emergency Contact:   Extended Emergency Contact Information  Primary Emergency Contact: Waleska Fregoso  Address: [address] Phone: [phone]  Mobile Phone: [phone]  Relation: Spouse  Secondary Emergency Contact: Sadia Wells, [address] [place] [address] Phone: [phone]  Mobile Phone: [phone]  Relation: Child    Past Surgical History:  Past Surgical History:   Procedure Laterality Date    LUNG BIOPSY Left     LUNG BIOPSY Left     10 years ago       Immunization History:   Immunization History   Administered Date(s) Administered    Influenza Vaccine, unspecified formulation 10/05/2016    from Last 1 Encounters:   06/05/21 257 lb 0.9 oz (116.6 kg)     Mental Status:  oriented    IV Access:  - None    Nursing Mobility/ADLs:  Walking   Independent  Transfer  Independent  Bathing  Independent  Dressing  Independent  Toileting  Independent  Feeding  Independent  Med [address]  Med Delivery   whole    Wound Care Documentation and Therapy:        Elimination:  Continence:   · Bowel: Yes  · Bladder: Yes  Urinary Catheter: None   Colostomy/Ileostomy/Ileal Conduit: No         Safety Concerns: At Risk for Falls    Impairments/Disabilities:      None    Nutrition Therapy:  Current Nutrition Therapy:   - Oral Diet:  Low Sodium (3-4gm)    Routes of Feeding: Oral  Liquids: No Restrictions  Daily Fluid Restriction: no  Last Modified Barium Swallow with Video (Video Swallowing Test): not done    Treatments at the Time of Hospital Discharge:   Respiratory Treatments: See discharge orders  Oxygen Therapy:  is on oxygen at 7-8L L/min per nasal cannula. Ventilator:    - No ventilator support    Rehab Therapies: None  Weight Bearing Status/Restrictions: No weight bearing restirctions  Other Medical Equipment (for information only, NOT a DME order):  Cane, motor scooter, nebulizer and home oxygen  Other Treatments: Skilled Nursing Assessment; Medication Education and Monitor    Home health care agency's  to evaluate patient two weeks prior to discharge from home health to determine post-discharge services.        Patient's personal belongings (please select all that are sent with patient):      RN SIGNATURE:  Electronically signed by Debra Clark RN on 6/6/21 at 9:45 AM EDT    CASE MANAGEMENT/SOCIAL WORK SECTION    Inpatient Status Date: 6/2/2021    Readmission Risk Assessment Score:  Readmission Risk              Risk of Unplanned Readmission:  10           Discharging to Facility/ [address]  Phone: [phone]  Fax [phone]      / signature: Electronically signed by Velvet Gonzales RN on 6/5/21 at 1:53 PM EDT    PHYSICIAN SECTION    Prognosis: Good    Condition at Discharge: Stable    Rehab Potential (if transferring to Rehab): Good    Recommended Labs or Other Treatments After Discharge:     Physician Certification: I certify the above information and transfer of Noemy Baxter  is necessary for the continuing treatment of the diagnosis listed and that he requires Home Care for less 30 days.      Update Admission H&P: No change in H&P    PHYSICIAN SIGNATURE:  Electronically signed by Cesar Dai MD on 6/6/21 at 9:26 AM EDT

## 2021-06-06 NOTE — PROGRESS NOTES
Bipap on standby at this time. Pulse Ox-87% 7lpm-       Skin score--0      Nasal gel pad available at bedside. Pt wore last night. O2 sat low, cannula out of mouth. Pt awake/alert/no distress noted.        BRONCHOSPASM/BRONCHOCONSTRICTION     [x]         IMPROVE AERATION/BREATH SOUNDS  [x]   ADMINISTER BRONCHODILATOR THERAPY AS APPROPRIATE  [x]   ASSESS BREATH SOUNDS  []   IMPLEMENT AEROSOL/MDI PROTOCOL  [x]   PATIENT EDUCATION AS NEEDED    MOBILIZE SECRETIONS    [x]   ASSESS BREATH SOUNDS  [x]   ASSESS SPUTUM PRODUCTION  [x]   COUGH AND DEEP BREATHING  [x]  IMPLEMENT SECRETION MANAGEMENT PROTOCOL  []   PATIENT EDUCATION AS NEEDED    PROVIDE ADEQUATE OXYGENATION WITH ACCEPTABLE SP02/ABG'S    [x]  IDENTIFY APPROPRIATE OXYGEN THERAPY  [x]   MONITOR SP02/ABG'S AS NEEDED   [x]   PATIENT EDUCATION AS NEEDED    NONINVASIVE VENTILATION    PROVIDE OPTIMAL VENTILATION/ACCEPTABLE SPO2   IMPLEMENT NONINVASIVE VENTILATION PROTOCOL   MAINTAIN ACCEPTABLE SPO2   ASSESS SKIN INTEGRITY/BREAKDOWN SCORE   PATIENT EDUCATION AS NEEDED   BIPAP AS NEEDED

## 2021-06-07 NOTE — TELEPHONE ENCOUNTER
Called López from [address] [place] [address] St advised note. She stated she would call pt and let him know. She stated pt did not want to go to the ER but Idalmis Luong stated she was seeing the pt tomorrow and would make him go if the HR was around 130 or higher.

## 2021-06-07 NOTE — TELEPHONE ENCOUNTER
He has chronic shortness of breath with history of silicosis  Was evaluated recently by cardiologist in the hospital, his medications were adjusted to control heart rate  Patient need to go to ER, if heart rate stays around 130

## 2021-06-08 NOTE — TELEPHONE ENCOUNTER
[facility] Trans[facility] Initial Follow Up Call    Outreach made within 2 business days of discharge: Yes    Patient: Mitch Hinton Patient : 1955   MRN: [id number]  Reason for Admission: There are no discharge diagnoses documented for the most recent discharge.   Discharge Date: 21       Spoke with: left message    Discharge department/facility: [facility]    Scheduled appointment with PCP within 7-14 days    Follow Up  Future Appointments   Date Time Provider Andrea Watson   2021  1:30 PM Nicki Sosa MD [address] [place] [address]

## 2021-06-09 NOTE — TELEPHONE ENCOUNTER
[facility] Trans[facility] Initial Follow Up Call    Outreach made within 2 business days of discharge: No    Patient: Gerber Blackburn Patient : 1955   MRN: [id number]  Reason for Admission: There are no discharge diagnoses documented for the most recent discharge. Discharge Date: 21       Spoke with: pt    [facility]    [facility] Interactive Patient Contact:  Was patient able to fill all prescriptions: Yes  Was patient instructed to bring all medications to the follow-up visit: Yes  Is patient taking all medications as directed in the discharge summary?  Yes  Does patient understand their discharge instructions: Yes  Does patient have questions or concerns that need addressed prior to 7-14 day follow up office visit: no    Scheduled appointment with PCP within 7-14 days    Follow Up  Future Appointments   Date Time Provider Andrea Watson   2021  1:30 PM Gladys Vázquez MD [address] [place] [address]

## 2021-07-07 PROBLEM — J96.20 ACUTE ON CHRONIC RESPIRATORY FAILURE (HCC): Status: ACTIVE | Noted: 2021-01-01

## 2021-07-07 PROBLEM — I48.91 ATRIAL FIBRILLATION WITH RVR (HCC): Status: ACTIVE | Noted: 2021-01-01

## 2021-07-07 PROBLEM — I50.9 CHRONIC HEART FAILURE (HCC): Status: ACTIVE | Noted: 2021-01-01

## 2021-07-07 NOTE — H&P
[address]      [facility]     HISTORY AND PHYSICAL EXAMINATION            Date:   7/7/2021  Patient name:  Betzy Yeager  Date of admission:  7/7/2021  4:54 PM  MRN:   [id number]  Account:  [de-identified]  YOB: 1955  PCP:    Derrick Navarro MD  Room:   10/10  Code Status:    Prior    Chief Complaint:     Chief Complaint   Patient presents with    Other     Referred by cardiologist; Dr. Jordan Gardner for rapid heart rate. History Obtained From:     patient    History of Present Illness: The patient is a 73 y.o.  male who presents withOther (Referred by cardiologist; Dr. Jordan Gardner for rapid heart rate. )   and he is admitted to the hospital for the management of  acute on chronic respiratory failure and afib w/ RVR. His PMH is significant for HTN, Afib w/ RVR, HTN, JODIE not using bipap regularly, hypothyroidism, COPD, silicosis, and CHF. 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 Patient is examined at bedside and is on Bipap. Pt reports chronic SOB, LE edema, cough, and heart racing. He reports coughing up green/ brown phlegm. He is on chronic steroids for respiratory failure. He denies CP, palpitations, abdominal pain, urinary dysuria/ urgency/ frequency, diarrhea, congestion, nasal discharge. Pt quit smoking 17 years ago and smoked on/off for 15 years. He drinks alcohol (unable to quantify) and doesn't use drugs. In the ED, cefepime 2000mg IV, azithromycin 500mg, vancomycin 2500mg IV, methylprednisolone 125mg was ordered. CXR showed diffuse b/l alveolar infiltrates consistent w/ slight improvement in edema or multifocal PNA. EKG shows sinus tachycardia, abnormal when compared to 6/2/21. Troponin was 33.     Wife's contact is [phone]    Past Medical History:     Past Medical History:   Diagnosis Date    COPD (chronic obstructive pulmonary disease) (HCC)     Depressive disorder, not elsewhere classified     Impotence of organic origin     Insomnia, unspecified     Myopathy, unspecified     [facility] pulmonary fibrosis [facility]     Silicosis ([facility]     Unspecified essential hypertension     Unspecified hypothyroidism         Past SurgicalHistory:     Past Surgical History:   Procedure Laterality Date    LUNG BIOPSY Left     LUNG BIOPSY Left     10 years ago        Medications Prior to Admission:        Prior to Admission medications    Medication Sig Start Date End Date Taking?  Authorizing Provider   midodrine (PROAMATINE) 5 MG tablet Take 5 mg by mouth 3 times daily   Yes Historical Provider, MD   acetylcysteine (MUCOMYST) 20 % nebulizer solution USE 3 ML VIA NEBULIZER TWICE DAILY FOR 10 DAYS 6/21/21  Yes YOSSI Valentino - CNP   apixaban (ELIQUIS) 5 MG TABS tablet Take 1 tablet by mouth 2 times daily 6/6/21  Yes Aurelia Nogueira MD   carvedilol (COREG) 6.25 MG tablet Take 1 tablet by mouth 2 times daily (with meals) 6/6/21  Yes Aurelia Nogueira MD   dilTIAZem (CARDIZEM) 60 MG tablet Take 1 tablet by mouth 2 times daily 6/6/21  Yes Aurelia Nogueira MD   albuterol (PROVENTIL) (2.5 MG/3ML) 0.083% nebulizer solution Take 3 mLs by nebulization every 6 hours as needed for Wheezing or Shortness of Breath 3/1/21  Yes Aurelia Nogueira MD   furosemide (LASIX) 20 MG tablet Take 1 tablet by mouth daily as needed (swelling) 3/1/21  Yes Aurelia Nogueira MD   ibuprofen (ADVIL;MOTRIN) 800 MG tablet Take 1 tablet by mouth 2 times daily 3/1/21  Yes Aurelia Nogueira MD   levothyroxine (SYNTHROID) 25 MCG tablet Take 1 tablet by mouth Daily 3/1/21  Yes Aurelia Nogueira MD   pantoprazole (PROTONIX) 40 MG tablet Take 1 tablet by mouth 2 times daily 3/1/21  Yes Aurelia Nogueira MD   predniSONE (DELTASONE) 5 MG tablet Take 20 mg by mouth daily    Yes Historical Provider, MD   zolpidem (AMBIEN CR) 12.5 MG extended release tablet Take 12.5 mg by mouth nightly as needed for Sleep. Yes Historical Provider, MD   PROAIR  (90 BASE) MCG/ACT inhaler Inhale 2 puffs into the lungs every 6 hours as needed for Wheezing  16  Yes Historical Provider, MD   DULoxetine (CYMBALTA) 30 MG capsule Take 90 mg by mouth daily  2/10/15  Yes Historical Provider, MD        Allergies:     Patient has no known allergies. Social History:     Tobacco:    reports that he quit smoking about 16 years ago. He has a 3.75 pack-year smoking history. He has never used smokeless tobacco.  Alcohol:      reports current alcohol use. Drug Use:  reports no history of drug use. Family History:     Family History   Problem Relation Age of Onset    High Blood Pressure Mother     Cancer Mother     Stroke Mother     High Blood Pressure Father     Cancer Sister     Heart Disease Other         Family in general     Review of Systems:     Positive and Negative as described in HPI. Review of Systems   Constitutional: Negative for chills and fever. HENT: Negative for congestion and rhinorrhea. Respiratory: Positive for cough and shortness of breath. Negative for chest tightness. Cardiovascular: Positive for leg swelling. Negative for chest pain and palpitations. Gastrointestinal: Negative for abdominal distention, abdominal pain, diarrhea and nausea. Genitourinary: Positive for frequency (on lasix). Negative for dysuria. Skin: Negative for color change and rash. Neurological: Negative for dizziness and headaches. Psychiatric/Behavioral: Negative for agitation and confusion. Physical Exam:   /77   Pulse 138   Temp 97.7 °F (36.5 °C) (Oral)   Resp (!) 34   Ht 6' 7\" (2.007 m)   Wt 254 lb (115.2 kg)   SpO2 92%   BMI 28.61 kg/m²   Temp (24hrs), Av.7 °F (36.5 °C), Min:97.7 °F (36.5 °C), Max:97.7 °F (36.5 °C)    No results for input(s): POCGLU in the last 72 hours.     Intake/Output Summary (Last 24 hours) at 2021  Last data filed at 2021 1815  Gross per 24 hour   Intake 50 ml   Output    Net 50 ml     Physical Exam  Constitutional:       Appearance: He is not ill-appearing or toxic-appearing. Comments: Pt on bipap, mild distress   HENT:      Head: Normocephalic and atraumatic. Nose: No congestion or rhinorrhea. Eyes:      Extraocular Movements: Extraocular movements intact. Conjunctiva/sclera: Conjunctivae normal.   Cardiovascular:      Rate and Rhythm: Tachycardia present. Pulses: Normal pulses. Heart sounds: No murmur heard. Pulmonary:      Effort: Respiratory distress present. Breath sounds: Rales present. Abdominal:      General: Bowel sounds are normal.      Palpations: Abdomen is soft. Tenderness: There is no abdominal tenderness. Hernia: A hernia is present. Musculoskeletal:      Right lower leg: Edema present. Left lower leg: Edema present. Skin:     General: Skin is warm and dry. Coloration: Skin is not jaundiced. Neurological:      General: No focal deficit present. Mental Status: He is alert and oriented to person, place, and time.        Investigations:     Laboratory Testing:  Recent Results (from the past 24 hour(s))   CBC Auto Differential    Collection Time: 07/07/21  5:28 PM   Result Value Ref Range    WBC 7.0 3.5 - 11.0 k/uL    RBC 4.99 4.5 - 5.9 m/uL    Hemoglobin 15.0 13.5 - 17.5 g/dL    Hematocrit 47.3 41 - 53 %    MCV 94.8 80 - 100 fL    MCH 30.1 26 - 34 pg    MCHC 31.8 31 - 37 g/dL    RDW 16.5 (H) 11.5 - 14.9 %    Platelets 752 061 - 689 k/uL    MPV 8.8 6.0 - 12.0 fL    NRBC Automated NOT REPORTED per 100 WBC    Differential Type NOT REPORTED     Immature Granulocytes NOT REPORTED 0 %    Absolute Immature Granulocyte NOT REPORTED 0.00 - 0.30 k/uL    WBC Morphology NOT REPORTED     RBC Morphology NOT REPORTED     Platelet Estimate NOT REPORTED     Seg Neutrophils 90 (H) 36 - 66 %    Lymphocytes 4 (L) 24 - 44 %    Monocytes 4 1 - 7 %    Eosinophils % 0 0 - 4 % Basophils 1 0 - 2 %    Bands 1 0 - 10 %    Segs Absolute 6.30 1.3 - 9.1 k/uL    Absolute Lymph # 0.28 (L) 1.0 - 4.8 k/uL    Absolute Mono # 0.28 0.1 - 1.3 k/uL    Absolute Eos # 0.00 0.0 - 0.4 k/uL    Basophils Absolute 0.07 0.0 - 0.2 k/uL    Absolute Bands # 0.07 0.0 - 1.0 k/uL    Morphology ANISOCYTOSIS PRESENT     Morphology HYPOCHROMIA PRESENT     Morphology BASOPHILIC STIPPLING PRESENT    Basic Metabolic Prof    Collection Time: 07/07/21  5:28 PM   Result Value Ref Range    Glucose 132 (H) 70 - 99 mg/dL    BUN 25 (H) 8 - 23 mg/dL    CREATININE 1.03 0.70 - 1.20 mg/dL    Bun/Cre Ratio NOT REPORTED 9 - 20    Calcium 8.9 8.6 - 10.4 mg/dL    Sodium 137 135 - 144 mmol/L    Potassium 4.3 3.7 - 5.3 mmol/L    Chloride 92 (L) 98 - 107 mmol/L    CO2 37 (H) 20 - 31 mmol/L    Anion Gap 8 (L) 9 - 17 mmol/L    GFR Non-African American >60 >60 mL/min    GFR African American >60 >60 mL/min    GFR Comment          GFR Staging NOT REPORTED    Troponin    Collection Time: 07/07/21  5:28 PM   Result Value Ref Range    Troponin, High Sensitivity 33 (H) 0 - 22 ng/L    Troponin T NOT REPORTED <0.03 ng/mL    Troponin Interp NOT REPORTED    Brain Natriuretic Peptide    Collection Time: 07/07/21  5:28 PM   Result Value Ref Range    Pro-BNP 6,171 (H) <300 pg/mL    BNP Interpretation Pro-BNP Reference Range:    Procalcitonin    Collection Time: 07/07/21  5:37 PM   Result Value Ref Range    Procalcitonin 0.10 (H) <0.09 ng/mL   Lactic Acid    Collection Time: 07/07/21  5:50 PM   Result Value Ref Range    Lactic Acid 2.1 0.5 - 2.2 mmol/L   COVID-19, Rapid    Collection Time: 07/07/21  6:19 PM    Specimen: Nasopharyngeal Swab   Result Value Ref Range    Specimen Description . 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 w/ concern for PNA   -ceftriaxone 1000mg IV, azithromycin 250mg IV   -CBC, CMP   -blood culture 1&2   -sputum culture   -legionella, strep pneumo ordered   -methylprednisolone 125 mg    -home meds acetylcysteine nebulizer, albuterol nebulizer, proair inhaler continued   -home prednisone 5mg switched to    -on bipap currently, IPAP 14, EPAP 7, fio2 50%   -pulmonology consulted, recommendations appreciated    -Atrial fibrillation w/ RVR, CHF   -CXR, EKG, troponins ordered   -Apixaban 5mg tablet bid   -Carvedilol 6.25mg tablet bid   -midodrine 5mg tid home med   -Last echo 8/2020 EF is 50%, LA/ RV/ RA dilatation    -BNP is 6171   -Diet: fluid restrict to 1500ml, no added salt   -continue home lasix 20mg tablet daily   -cardiology consulted, recommendations appreciated    -Hypertension   -as above    -Hypothyroidism   -levothyroxine 25mcg tablet daily   -TSH reflex to T4    -Obstructive sleep apnea, not on bipap regularly   -Patient education about effects of bipap noncompliance     -MDD, in remission   -continue home duloxetine 30mg capsule daily    GI ppx: pepcid injection  DVT ppx: on eliquis as above    Consultations:   IP CONSULT TO CARDIOLOGY  IP CONSULT TO PULMONOLOGY  IP CONSULT TO CARDIOLOGY  IP CONSULT TO HEART FAILURE NURSE/COORDINATOR  IP CONSULT TO SOCIAL WORK  IP CONSULT TO PULMONOLOGY    Patient is admitted as inpatient status because of co-morbiditieslisted above, severity of signs and symptoms as outlined, requirement for current medical therapies and most importantly because of direct risk to patient if care not provided in a hospital setting.     Adin Live MD  7/7/2021  7:12 PM    Copy sent to Dr. Laura Jeffrey MD

## 2021-07-07 NOTE — ED NOTES
Report given to Rodrigo Barnes RN from ED   Report method {IN PERSON   The following was reviewed with receiving RN: repeat labs 1950, needs vanco initiated. Continuous BiPap _---->Int. ICU admit   Current vital signs:  /77   Pulse 138   Temp 97.7 °F (36.5 °C) (Oral)   Resp (!) 34   Ht 6' 7\" (2.007 m)   Wt 254 lb (115.2 kg)   SpO2 92%   BMI 28.61 kg/m²                      Any medication or safety alerts were reviewed. Any pending diagnostics and notifications were also reviewed, as well as any safety concerns or issues, abnormal labs, abnormal imaging, and abnormal assessment findings. Questions were answered.             Zulay Mathew RN  07/07/21 2967

## 2021-07-07 NOTE — Clinical Note
Patient Class: Inpatient [101]   REQUIRED: Diagnosis: Acute on chronic respiratory failure ([facility] [facility] [[id number]]   Estimated Length of Stay: Estimated stay of less than 2 midnights   Admitting Provider: Dari Sanchez [[id number]]   Telemetry/Cardiac Monitoring Required?: Yes

## 2021-07-07 NOTE — ED NOTES
Wife updated on patient status. Can be reached at [phone].      Mauri Olmos RN  07/07/21 2951

## 2021-07-07 NOTE — PROGRESS NOTES
Medication History completed:    New medications: midodrine    Medications discontinued: lorazepam    Changes to dosing: none    Stated allergies: NKDA    Other pertinent information: Medications confirmed with [facility] and [facility].      Thank you,  Dana Monterroso, PharmD, BCPS  [phone]

## 2021-07-08 LAB
EKG ATRIAL RATE: 138 BPM
EKG P AXIS: 109 DEGREES
EKG P-R INTERVAL: 114 MS
EKG Q-T INTERVAL: 286 MS
EKG QRS DURATION: 138 MS
EKG QTC CALCULATION (BAZETT): 433 MS
EKG R AXIS: 129 DEGREES
EKG T AXIS: -92 DEGREES
EKG VENTRICULAR RATE: 138 BPM

## 2021-07-08 NOTE — FLOWSHEET NOTE
[facility]     Patient Death Note                                      DEATH                Room #    Name: Nicolle Watkins            Age: 73 y.o. Gender: male          Baptism: None   Place of Church:   Admit Date & Time: 2021  4:54 PM     Referral: Cardiologist Dr. Gris Orozco   Actual date of death: 2021   TOD:        SITUATION AT DEATH:  Patient is a 73 y.o. male who was referred to ED from his cardiologist office for hypotension and a rapid heart rate. Patient condition worsen and CPR was performed several times, for over one hour, with difficulty for patient to maintain B/P above 30. Wife was updated on patient condition after CPR was initiated. Family members begin coming to ED. This is not a  's case. SPIRITUAL/EMOTIONAL INTERVENTION:  Writer received call from ED Main line, that a cardiac arrest was occurring.  was able to meet Talisha Rollins, patient's son in the waiting room. He was brought to discuss his father's condition with ED physician. While talking, doctor was called away, CPR was resumed. Patient did not respond to CPR and   p.m. Patient's wife [facility], daughter [place] and other family members begin to arrived. Writer offered condolences and support to family. Room was fill with lots of tears and sadness from wife, daughter and other family members. Family sat surrounded patient's bed in shock and disbelief that he was gone. Patient's teenage daughter Judson bryant blame herself for leaving, \"I should have stayed\". Very sad situation. Family stayed awhile touching patient hand throughout the evening. Writer was able to offer prayer, emotional support, along with being a listening presence. Family was grateful support received from [facility] ED staff. Family Received Grief Packet?   No     HOME:  Name: [address] [place] [address] Avenue: [place] [address]  Phone Number: [phone]    NEXT OF KIN:  Name: Talisha Rollins Andressa Loser  Relationship: son  Street Address: [address] [place] [address] Dr. Jonh Mendez: Khurram Friends: [place]  Zip code:    Phone Number: Tuan Lynn PTA, Chaplain  7/7/2021 11:40 PM

## 2021-07-08 NOTE — ED NOTES
Pt transferred to room 6; writer ceased code documentation; please see primary RNs documentation.      Marge Lai RN  07/07/21 8575

## 2021-07-08 NOTE — ED PROVIDER NOTES
[address]  eMERGENCY dEPARTMENT eNCOUnter   Attending Attestation     Pt Name: Blanca Drummond  MRN: [id number]  [place] 1955  Date of evaluation: 7/7/21    History, EXAM, MDM:    Blanca Drummond is a 73 y.o. male who presents with Other (Referred by cardiologist; Dr. Constance Ambriz for rapid heart rate. )  The patient was sent from his cardiologist office for hypotension and a rapid heart rate. The patient has a history of multiple chronic medical conditions including COPD, pulmonary fibrosis, silicosis, CHF. Upon arrival, the patient's blood pressure was a systolic of 682, and he was very tachypneic. The patient had bilateral crackles and rails, he had lower extremity pitting edema, and JVD. EKG showed a sinus rhythm. HR is 138, , , , no KAYLA, STD in V3,4, TWI in V3-6, there is a right bundle branch block. The axis is normal.  The patient was started on BiPAP. The patient's chest x-ray showed persistent diffuse airspace disease. Laboratory studies showed an elevated BNP. Troponin was elevated. White count was normal.  Lactic acid of 2. The patient was started on BiPAP. Consults were made to his cardiologist.  Discussed with the internal medicine service and the patient was admitted to the hospital.  The patient asked for a break from the BiPAP, and the BiPAP was removed. The patient became hypoxic, and then became confused. When trying to put the BiPAP  he became diaphoretic and then went into a witnessed cardiac arrest.  ACLS initiated. Initial rhythm was PEA, he was then noted to be in a wide-complex tachycardia and pulseless so he was defibrillated with 360 J, he was treated with multiple rounds of epi, he received 300 mg of amiodarone, he received bicarbonate, calcium gluconate and also glucagon. He was intubated during CPR, a central line and an art line were placed for better IV access and hemodynamic monitoring.   We did obtain ROSC, but the patient would then become bradycardic and lose pulses. The family was called to the hospital, and the family was updated. We were able to obtain ROSC several times with CPR and epi. I consulted with his cardiologist, who recommend that we put him on dopamine and Lamont-Synephrine infusions which we did. Despite this the patient continued to go into a PEA cardiac arrest.  After over 1 hour of CPR, the patient had no pulse on the art line, no neurologic activity, and no palpable pulse. Time of death was called at 21:04. Family updated. PCP contacted and will fill out the death certificate.  contacted and not an ME case. Critical Care  Performed by: Sammi Osborne MD  Authorized by: Sammi Osborne MD     Critical care provider statement:     Critical care time (minutes):  54    Critical care was necessary to treat or prevent imminent or life-threatening deterioration of the following conditions:  Cardiac failure and circulatory failure    Critical care was time spent personally by me on the following activities:  Ordering and review of laboratory studies, ordering and performing treatments and interventions, ordering and review of radiographic studies, pulse oximetry, review of old charts, evaluation of patient's response to treatment, discussions with primary provider, discussions with consultants, examination of patient and ventilator management          Vitals:   Vitals:    07/07/21 2036 07/07/21 2038 07/07/21 2040 07/07/21 2041   BP: 95/69 (!) 79/67 100/75    Pulse: 97 93 89 88   Resp: (!) 32 24 22 23   Temp:       TempSrc:       SpO2:       Weight:       Height:         I performed a history and physical examination of the patient and discussed management with the resident. I reviewed the residents note and agree with the documented findings and plan of care. Any areas of disagreement are noted on the chart. I was personally present for the key portions of any procedures.  I have documented in the chart those

## 2021-07-08 NOTE — CODE DOCUMENTATION
Called and LM for mom to contact insurance or look at formulary for our options since insurance did not send any back to us. Pulse present; SR on monitor; pt became bradycardiac; pulse lost; CPR resumed.

## 2021-07-08 NOTE — ED NOTES
Levophed titrated down to [address] [facility] [address] [place], [facility]  07/07/21 3071

## 2021-07-08 NOTE — PROCEDURES
PROCEDURE NOTE - EMERGENCY INTUBATION    PATIENT NAME: Josselyn [facility] [address] RECORD NO. [id number]  DATE: 7/7/2021  ATTENDING PHYSICIAN: Dr Trang Sykes    PREOPERATIVE DIAGNOSIS:  Acute Respiratory Failure  POSTOPERATIVE DIAGNOSIS:  Same  PROCEDURE PERFORMED:  Emergency endotracheal intubation  PERFORMING PHYSICIAN: Crista Holt MD    MEDICATIONS: Unable to premedicate due to the emergent nature of the procedure    DISCUSSION:  Jamison Clay is a 73y.o.-year-old male who requires intubation and ventilatory support due to PEA arrest.  The history and physical examination were reviewed and confirmed. CONSENT: Unable to be obtained due to the emergent nature of this procedure. PROCEDURE:  A timeout was initiated by the bedside nurse and was confirmed by those present. The patient was placed in the appropriate position. Cricoid pressure was not required. Intubation was performed by direct laryngoscopy using a laryngoscope and a 7.5 cuffed endotracheal tube. The cuff was then inflated and the tube was secured appropriately at a distance of 24 cm to the dental ridge. Initial confirmation of placement included bilateral breath sounds, an end tidal CO2 detector and tube fogging. Readjusted as cuff was inflated above vocal cords during CPR. The patient tolerated the procedure well. COMPLICATIONS:  None     PROCEDURE NOTE - CENTRAL VENOUS LINE PLACEMENT    PREOPERATIVE DIAGNOSIS:  vascular access  POSTOPERATIVE DIAGNOSIS:  Same  PROCEDURE PERFORMED:  Left Femoral Vein Central Line Insertion  PERFORMING PHYSICIAN: Crista Holt MD  ANESTHESIA:  Local utilizing 1% lidocaine  ESTIMATED BLOOD LOSS:  Less than 25 ml  COMPLICATIONS:  None immediately appreciated. DISCUSSION:  Jamison Clay is a 73y.o.-year-old male who requires central IV access vascular access. The history and physical examination were reviewed and confirmed.       CONSENT: Unable to be obtained due to the emergent nature of this procedure. PROCEDURE:  A timeout was initiated by the bedside nurse and was confirmed by those present. The patient was placed in a supine position. The skin overlying the Left Femoral Vein was prepped with chlorhexadine and draped in sterile fashion. The skin was infiltrated with local anesthetic. The vessel and surrounding anatomy was visualized using ultrasound. Through the anesthetized region, the introducer needle was inserted into the femoral vein returning dark red non pulsatile blood. A guidewire was placed through the center of the needle with no resistance. Ultrasound confirmed presence of wire in the vein. A small incision made in the skin with a #11 scalpel blade. The dilator was inserted into the skin and vein over guidewire using Seldinger technique. The dilator was then removed and the 9F 20cm catheter was placed in the vein over the guidewire using Seldinger technique. The guidewire was then removed and all ports aspirated and flushed appropriately. The catheter then secured using silk suture and a temporary sterile dressing was applied. No immediate complication was evident. All sponge, instrument and needle counts were correct at the completion of the procedure. The patient tolerated the procedure well with no immediate complication evident. PROCEDURE NOTE - ARTERIAL LINE PLACEMENT    PREOPERATIVE DIAGNOSIS:  Need for blood pressure monitoring  POSTOPERATIVE DIAGNOSIS:  Same  PROCEDURE PERFORMED: Left Femoral Arterial Line Insertion  PERFORMING PHYSICIAN:  TIO Salazar MD  ESTIMATED BLOOD LOSS:  Less than 25 ml  COMPLICATIONS:  None immediately appreciated. DISCUSSION:  Edison Negron is a 73 y.o. male who requires invasive pressure monitoring. The history and physical examination were reviewed and confirmed. CONSENT: Unable to be obtained due to the emergent nature of this procedure.      PROCEDURE:  A timeout was initiated by the bedside nurse and was confirmed by those present. The patient was placed in a supine position. The skin overlying the Left Femoral was prepped with chlorhexadine. Through this region, the introducer needle through catheter was inserted into L femoral artery until pulsatile bright blood was seen in collection tubing. Guidewire was advanced with no resistance. Catheter was advanced into the artery, wire was pulled with brisk bleeding noted. Pressure monitoring setup was connected to the catheter, it aspirated and flushed easily. The catheter was secured to the wrist with 3-0 silk. No immediate complication was evident. All sponge, instrument and needle counts were correct at the completion of the procedure.       Fred Delarosa MD  10:51 PM, 7/7/21

## 2021-07-08 NOTE — PROGRESS NOTES
Arrived to ED to check BIPAP. Pt was uncomfortable on medium mask. I removed mask and placed pt on 6 lpm NC. Sp02 96%. PT was refusing to go back on bipap. Notified MD and she was present in room with pt. He continued to refuse BIPAP. Pt became unresponsive and CPR started. Intubated with 7.5 ET tube at 28 at teeth. CPR continued and bagged throughout on 100% 02. MD called a stop to CPR. Vent and Bipap removed from room.

## 2021-07-08 NOTE — ED NOTES
Personal effects--yellow chain with cross, yellow band, wallet and socks--given to pt's wife.      Lazara Magallanes RN  07/07/21 1796

## 2021-07-09 ENCOUNTER — TELEPHONE (OUTPATIENT)
Dept: INTERNAL MEDICINE CLINIC | Age: 66
End: 2021-07-09

## 2021-07-09 NOTE — TELEPHONE ENCOUNTER
Received call from [facility], Patsy Dawson  on 21 at [facility]. Would you sign death certificate?

## 2021-07-12 NOTE — TELEPHONE ENCOUNTER
FH called to ck on the status of their request.    Writer informed fh per Dr. Lata Azevedo, Dr. Chelsy Small to sign since pt  in ER. [facility] will drop off dc for Dr. Chelsy Small to sign.

## 2021-07-13 LAB
CULTURE: NORMAL
CULTURE: NORMAL
Lab: NORMAL
Lab: NORMAL
SPECIMEN DESCRIPTION: NORMAL
SPECIMEN DESCRIPTION: NORMAL

## 2021-11-15 NOTE — PROGRESS NOTES
PULMONARY PROGRESS NOTE:    Interval History: Hypoxia, Silicosis    Shortness of Breath: yes - mostly exertional  Cough: yes - usually brings up phlegm in am  Sputum: no  Hemoptysis: no  Chest Pain: no  Fever:   Swelling Feet: +  Headache:   Nausea, Emesis, Abdominal Pain:   Diarrhea: no  Constipation: no    Events since last visit: dropped sats when up to BR earlier - recovered after rest    PAST MEDICAL HISTORY:    Smoking: remote history    PHYSICAL EXAMINATION:  afebrile  General : Awake, alert, oriented to time, place, and person  Neck - supple, no lymphadenopathy, JVD not raised  Heart - regular rhythm, S1 and S2 normal; no additional sounds heard  Lungs - Air Entry- fair bilaterally; breath sounds : posterior rales/crackles, 94% on 5 l at rest  Abdomen - soft, no tenderness  Upper Extremities  - no cyanosis, mottling; edema : absent  Lower Extremities: no cyanosis, mottling; edema : trace edema    Current Laboratory, Radiologic, Microbiologic, and Diagnostic studies reviewed    ASSESSMENT / PLAN:  A fib- cardiology consult   Acute on chronic hypoxic respiratory failure- wean O2 to maintain sats >90%  Pulmonary Fibrosis/Silicosis- BD, increase prednisone  To 20 daily  One time dose IV steroids today  Add BD, Acapella, mucinex  RLE wound- Abx  HTN  OK for DC from Pulm  F2F regarding new nebulizer    This is a late note on patient seen by me earlier today.   Electronically signed by Sea Berger on 08/05/20 at 5:46 PM. Cimzia Pregnancy And Lactation Text: This medication crosses the placenta but can be considered safe in certain situations. Cimzia may be excreted in breast milk.

## 2022-09-09 NOTE — ED PROVIDER NOTES
[address]  Emergency Department Encounter  EmergencyMedicine Resident     Pt Name:Daniel Concepcion  MRN: [id number]  [place] 1955  Date of evaluation: 7/7/21  PCP:  Lennie Calhoun MD    This patient was evaluated in the Emergency Department for symptoms described in the history of present illness. The patient was evaluated in the context of the global COVID-19 pandemic, which necessitated consideration that the patient might be at risk for infection with the SARS-CoV-2 virus that causes COVID-19. Institutional protocols and algorithms that pertain to the evaluation of patients at risk for COVID-19 are in a state of rapid change based on information released by regulatory bodies including the CDC and federal and state organizations. These policies and algorithms were followed during the patient's care in the ED. CHIEF COMPLAINT       Chief Complaint   Patient presents with    Other     Referred by cardiologist; Dr. Roark Leventhal for rapid heart rate. HISTORY OF PRESENT ILLNESS  (Location/Symptom, Timing/Onset, Context/Setting, Quality, Duration, Modifying Factors, Severity.)      Mary Jane Velazquez is a 73 y.o. male who presents with A. fib with RVR, sent by Dr. Roark Leventhal his cardiologist while at appointment. Patient states that he went to see his cardiologist due to palpitations, right now states that he is mildly lightheaded, however denies shortness of breath, chest pain, headaches, abdominal pain, nausea or vomiting. Patient is not able to speak in complete sentences due to tachypnea, and is on 5 to 6 L of home oxygen. Patient states that he has been requiring more oxygen recently. Patient also notes multiple week history of bilateral lower extremity edema that is new, and has recently started Eliquis 4 weeks ago. Patient has a history of pulmonary fibrosis, is a ex-smoker of 17 years.     PAST MEDICAL / SURGICAL / SOCIAL / FAMILY HISTORY      has a past medical history of COPD (chronic obstructive pulmonary disease) ([facility] Depressive disorder, not elsewhere classified, Impotence of organic origin, Insomnia, unspecified, Myopathy, unspecified, Postinflammatory pulmonary fibrosis ([facility] Silicosis ([facility] Unspecified essential hypertension, and Unspecified hypothyroidism. has a past surgical history that includes Lung biopsy (Left) and Lung biopsy (Left). Social History     Socioeconomic History    Marital status:      Spouse name: Not on file    Number of children: Not on file    Years of education: Not on file    Highest education level: Not on file   Occupational History    Occupation: disability   Tobacco Use    Smoking status: Former Smoker     Packs/day: 0.25     Years: 15.00     Pack years: 3.75     Quit date: 2005     Years since quittin.3    Smokeless tobacco: Never Used   Substance and Sexual Activity    Alcohol use: Yes     Alcohol/week: 0.0 standard drinks     Comment: every weekend- 6 Beers/[place]    Drug use: No    Sexual activity: Not on file   Other Topics Concern    Not on file   Social History Narrative    Not on file     Social Determinants of Health     Financial Resource Strain:     Difficulty of Paying Living Expenses:    Food Insecurity:     Worried About Running Out of Food in the Last Year:     [address] in the Last Year:    Transportation Needs:     Lack of Transportation (Medical):      Lack of Transportation (Non-Medical):    Physical Activity:     Days of Exercise per Week:     Minutes of Exercise per Session:    Stress:     Feeling of Stress :    Social Connections:     Frequency of Communication with Friends and Family:     Frequency of Social Gatherings with Friends and Family:     Attends [employer] Services:     Active Member of Clubs or Organizations:     Attends Club or Organization Meetings:     Marital Status:    Intimate Partner Violence:     Fear of Current or Ex-Partner:     Emotionally Abused:     Physically Abused:  Sexually Abused:        Family History   Problem Relation Age of Onset    High Blood Pressure Mother     Cancer Mother     Stroke Mother     High Blood Pressure Father     Cancer Sister     Heart Disease Other         Family in general       Allergies:  Patient has no known allergies. Home Medications:  Prior to Admission medications    Medication Sig Start Date End Date Taking? Authorizing Provider   midodrine (PROAMATINE) 5 MG tablet Take 5 mg by mouth 3 times daily   Yes Historical Provider, MD   acetylcysteine (MUCOMYST) 20 % nebulizer solution USE 3 ML VIA NEBULIZER TWICE DAILY FOR 10 DAYS 6/21/21  Yes YOSSI Pitt CNP   apixaban (ELIQUIS) 5 MG TABS tablet Take 1 tablet by mouth 2 times daily 6/6/21  Yes Lex Cowden, MD   carvedilol (COREG) 6.25 MG tablet Take 1 tablet by mouth 2 times daily (with meals) 6/6/21  Yes Lex Cowden, MD   dilTIAZem (CARDIZEM) 60 MG tablet Take 1 tablet by mouth 2 times daily 6/6/21  Yes Lex Cowden, MD   albuterol (PROVENTIL) (2.5 MG/3ML) 0.083% nebulizer solution Take 3 mLs by nebulization every 6 hours as needed for Wheezing or Shortness of Breath 3/1/21  Yes Lex Cowden, MD   furosemide (LASIX) 20 MG tablet Take 1 tablet by mouth daily as needed (swelling) 3/1/21  Yes Lex Cowden, MD   ibuprofen (ADVIL;MOTRIN) 800 MG tablet Take 1 tablet by mouth 2 times daily 3/1/21  Yes Lex Cowden, MD   levothyroxine (SYNTHROID) 25 MCG tablet Take 1 tablet by mouth Daily 3/1/21  Yes Lex Cowden, MD   pantoprazole (PROTONIX) 40 MG tablet Take 1 tablet by mouth 2 times daily 3/1/21  Yes Lex Cowden, MD   predniSONE (DELTASONE) 5 MG tablet Take 20 mg by mouth daily    Yes Historical Provider, MD   zolpidem (AMBIEN CR) 12.5 MG extended release tablet Take 12.5 mg by mouth nightly as needed for Sleep.    Yes Historical Provider, MD   PROAIR  (90 BASE) MCG/ACT inhaler Inhale 2 puffs into the lungs every 6 hours as needed for Wheezing  5/5/16  Yes Historical Provider, MD   DULoxetine (CYMBALTA) 30 MG capsule Take 90 mg by mouth daily  2/10/15  Yes Historical Provider, MD       REVIEW OF SYSTEMS    (2-9 systems for level 4, 10 or more for level 5)      Review of Systems   Constitutional: Negative for chills and fever. HENT: Negative for sore throat. Eyes: Negative for visual disturbance. Respiratory: Positive for cough and shortness of breath. Cardiovascular: Positive for palpitations and leg swelling. Negative for chest pain. Gastrointestinal: Negative for abdominal pain, diarrhea and vomiting. Endocrine: Negative for polyuria. Genitourinary: Negative for dysuria and hematuria. Musculoskeletal: Negative for neck pain. Skin: Negative for pallor. Neurological: Negative for light-headedness and headaches. Psychiatric/Behavioral: Negative for confusion. PHYSICAL EXAM   (up to 7 for level 4, 8 or more for level 5)      INITIAL VITALS:   /75   Pulse 88   Temp 97.7 °F (36.5 °C) (Oral)   Resp 23   Ht 6' 7\" (2.007 m)   Wt 254 lb (115.2 kg)   SpO2 (!) 82%   BMI 28.61 kg/m²     Physical Exam  Constitutional:       General: He is not in acute distress. Appearance: Normal appearance. He is normal weight. Comments: Patient is wearing nasal cannula in his mouth, states that he is unable to tolerate it through his nose   HENT:      Head: Normocephalic and atraumatic. Mouth/Throat:      Mouth: Mucous membranes are moist.   Eyes:      Extraocular Movements: Extraocular movements intact. Pupils: Pupils are equal, round, and reactive to light. Cardiovascular:      Rate and Rhythm: Tachycardia present. Rhythm irregular. Pulses: Normal pulses. Heart sounds: Normal heart sounds. Pulmonary:      Breath sounds: Rales present. Comments: Patient is dyspneic, unable to speak in full sentences  Abdominal:      Palpations: Abdomen is soft. Tenderness: There is no abdominal tenderness.  There is no right CVA tenderness or left CVA tenderness. Musculoskeletal:         General: No tenderness. Cervical back: Neck supple. No tenderness. Right lower leg: Edema present. Left lower leg: Edema present. Skin:     General: Skin is warm. Capillary Refill: Capillary refill takes less than 2 seconds. Neurological:      General: No focal deficit present. Mental Status: He is alert and oriented to person, place, and time. Mental status is at baseline.    Psychiatric:         Mood and Affect: Mood normal.       DIFFERENTIAL  DIAGNOSIS     PLAN (LABS / IMAGING / EKG):  Orders Placed This Encounter   Procedures    COVID-19, Rapid    Culture, Blood 1    Culture, Blood 1    XR CHEST PORTABLE    CBC Auto Differential    Basic Metabolic Prof    Troponin    Brain Natriuretic Peptide    Lactic Acid    Procalcitonin    Troponin    Lactic Acid    Telemetry monitoring - continuous duration    Inpatient consult to Cardiology    Inpatient consult to Pulmonology    Respiratory care evaluation only    BIPAP    EKG 12 Lead    EKG 12 Lead    PATIENT STATUS (FROM ED OR OR/PROCEDURAL) Inpatient    PATIENT STATUS (FROM ED OR OR/PROCEDURAL) Inpatient       MEDICATIONS ORDERED:  Orders Placed This Encounter   Medications    methylPREDNISolone sodium (SOLU-MEDROL) injection 125 mg    vancomycin (VANCOCIN) 2,500 mg in dextrose 5 % 500 mL IVPB     Order Specific Question:   Antimicrobial Indications     Answer:   Pneumonia (CAP)    cefepime (MAXIPIME) 2000 mg IVPB minibag     Order Specific Question:   Antimicrobial Indications     Answer:   Pneumonia (CAP)    azithromycin (ZITHROMAX) 500 mg in D5W 250ml addavial     Order Specific Question:   Antimicrobial Indications     Answer:   Pneumonia (CAP)    midodrine (PROAMATINE) tablet 10 mg    metoprolol (LOPRESSOR) injection 5 mg    EPINEPHrine PF 1 MG/ML injection    atropine injection    amiodarone (CORDARONE) injection    norepinephrine-sodium chloride (LEVOPHED) 16-0.9 MG/250ML-% infusion     RODRIGUEZ ARGUELLES: cabinet override    norepinephrine (LEVOPHED) 16 mg in sodium chloride 0.9 % 250 mL infusion    magnesium sulfate injection    calcium chloride 10 % injection    sodium bicarbonate 8.4 % injection    vasopressin 20 Units in dextrose 5 % 100 mL infusion    phenylephrine (CYRIL-SYNEPHRINE) 50 mg in dextrose 5 % 250 mL infusion    DOPamine (INTROPIN) 400 mg in dextrose 5 % 250 mL infusion    EPINEPHrine 1 MG/10ML injection       DDX: We will obtain cardiac work-up to rule out cardiac cause, due to presentation, likely suggestive of A. fib with RVR secondary to fluid overload and pulmonary edema. Patient on BiPAP per respiratory therapist. Patient is mentating well at current. DIAGNOSTIC RESULTS / EMERGENCY DEPARTMENT COURSE / MDM   LAB RESULTS:  Results for orders placed or performed during the hospital encounter of 07/07/21   COVID-19, Rapid    Specimen: Nasopharyngeal Swab   Result Value Ref Range    Specimen Description . NASOPHARYNGEAL SWAB     SARS-CoV-2, Rapid Not Detected Not Detected   CBC Auto Differential   Result Value Ref Range    WBC 7.0 3.5 - 11.0 k/uL    RBC 4.99 4.5 - 5.9 m/uL    Hemoglobin 15.0 13.5 - 17.5 g/dL    Hematocrit 47.3 41 - 53 %    MCV 94.8 80 - 100 fL    MCH 30.1 26 - 34 pg    MCHC 31.8 31 - 37 g/dL    RDW 16.5 (H) 11.5 - 14.9 %    Platelets 225 071 - 350 k/uL    MPV 8.8 6.0 - 12.0 fL    NRBC Automated NOT REPORTED per 100 WBC    Differential Type NOT REPORTED     Immature Granulocytes NOT REPORTED 0 %    Absolute Immature Granulocyte NOT REPORTED 0.00 - 0.30 k/uL    WBC Morphology NOT REPORTED     RBC Morphology NOT REPORTED     Platelet Estimate NOT REPORTED     Seg Neutrophils 90 (H) 36 - 66 %    Lymphocytes 4 (L) 24 - 44 %    Monocytes 4 1 - 7 %    Eosinophils % 0 0 - 4 %    Basophils 1 0 - 2 %    Bands 1 0 - 10 %    Segs Absolute 6.30 1.3 - 9.1 k/uL    Absolute Lymph # 0.28 (L) 1.0 - 4.8 k/uL    Absolute Mono # 0. 28 0.1 - 1.3 k/uL    Absolute Eos # 0.00 0.0 - 0.4 k/uL    Basophils Absolute 0.07 0.0 - 0.2 k/uL    Absolute Bands # 0.07 0.0 - 1.0 k/uL    Morphology ANISOCYTOSIS PRESENT     Morphology HYPOCHROMIA PRESENT     Morphology BASOPHILIC STIPPLING PRESENT    Basic Metabolic Prof   Result Value Ref Range    Glucose 132 (H) 70 - 99 mg/dL    BUN 25 (H) 8 - 23 mg/dL    CREATININE 1.03 0.70 - 1.20 mg/dL    Bun/Cre Ratio NOT REPORTED 9 - 20    Calcium 8.9 8.6 - 10.4 mg/dL    Sodium 137 135 - 144 mmol/L    Potassium 4.3 3.7 - 5.3 mmol/L    Chloride 92 (L) 98 - 107 mmol/L    CO2 37 (H) 20 - 31 mmol/L    Anion Gap 8 (L) 9 - 17 mmol/L    GFR Non-African American >60 >60 mL/min    GFR African American >60 >60 mL/min    GFR Comment          GFR Staging NOT REPORTED    Troponin   Result Value Ref Range    Troponin, High Sensitivity 33 (H) 0 - 22 ng/L    Troponin T NOT REPORTED <0.03 ng/mL    Troponin Interp NOT REPORTED    Brain Natriuretic Peptide   Result Value Ref Range    Pro-BNP 6,171 (H) <300 pg/mL    BNP Interpretation Pro-BNP Reference Range:    Lactic Acid   Result Value Ref Range    Lactic Acid 2.1 0.5 - 2.2 mmol/L   Procalcitonin   Result Value Ref Range    Procalcitonin 0.10 (H) <0.09 ng/mL   Lactic Acid   Result Value Ref Range    Lactic Acid 3.5 (H) 0.5 - 2.2 mmol/L   EKG 12 Lead   Result Value Ref Range    Ventricular Rate 138 BPM    Atrial Rate 138 BPM    P-R Interval 114 ms    QRS Duration 138 ms    Q-T Interval 286 ms    QTc Calculation (Bazett) 433 ms    P Axis 109 degrees    R Axis 129 degrees    T Axis -92 degrees       IMPRESSION: 55-year-old gentleman with a history of A. fib presents to the ED with A. fib with RVR due to recommendation from Dr. Constance Ambriz his cardiologist. Patient states that he is requiring more oxygen at home than usual and has been coughing more than usual, also has bilateral lower extremity edema for the last couple of weeks.  On physical exam, patient is tachycardic and tachypneic, unable to speak in full sentences. ANO x4, GCS 15. Presentation suggestive of A. fib with RVR secondary to pulmonary overload, will place patient on BiPAP. Cardiac work-up stable. Chest x-ray showing consolidation concerning for pneumonia, Vanco, cefepime, Zithromax initiated in ED. Midodrine and metoprolol given in ED as BiPAP has not improved his A. fib with RVR. Patient demonstrated improvement in blood pressure and admitted to ICU for further management. Patient coded x3 in the emergency department, see below for details. Time of death 2104    RADIOLOGY:  See radiology report    EKG  Ventricular rate 138, A. fib with RVR, intervals grossly unremarkable, marked global T wave inversion, likely artifact from dyspnea    All EKG's are interpreted by the Emergency Department Physician who either signs or Co-signs this chart in the absence of a cardiologist.    EMERGENCY DEPARTMENT COURSE:  ED Course as of Jul 07 2107   Wed Jul 07, 2021   1740 CXR  Slight improvement in still moderate to marked diffuse bilateral coarse  alveolar infiltrates/reticulonodular opacities, findings consistent with  slight improvement in edema and/or multifocal pneumonia    [EM]   1745 Dr Henry Marion recommends Cardizem 5mg bolus and 5mg per hour with midodrine     [EM]   3155 SARS-CoV-2, Rapid: Not Detected [EM]   9523 Pro-BNP(!): 6,171 [EM]   1857 Cardizem to be started on ICU floor    [EM]   2035 Checked on patient at [address] after Metoprolol administered at 1931, no changes in HR, BP stable, patient requested to be off BiPAP for a brief period of time. Started refusing BiPAP to be put on, explained to patient that it was necessary, patient became hypoxic to 80s and pulse ox undetectable but patient continues to converse, became encephalopathy and diaphoretic. Attempts made to replace BiPAP but patient lost pulses and CPR initiated. [EM]   2037 Patient intubated, central line and arterial line in place.  Achieved ROSC.    [EM]   2106 Patient coded x2 again after ROSC    Time of death 2104    [EM]      ED Course User Index  [EM] Kevin Fan MD        PROCEDURES:  None    CONSULTS:  IP CONSULT TO CARDIOLOGY  IP CONSULT TO PULMONOLOGY  IP CONSULT TO CARDIOLOGY  IP CONSULT TO HEART FAILURE NURSE/COORDINATOR  IP CONSULT TO SOCIAL WORK  IP CONSULT TO PULMONOLOGY  PHARMACY TO DOSE VANCOMYCIN     FINAL IMPRESSION      1. Atrial fibrillation with RVR (HCC)          DISPOSITION / PLAN     DISPOSITION        PATIENT REFERRED TO:  No follow-up provider specified.     DISCHARGE MEDICATIONS:  New Prescriptions    No medications on file       Kevin Fan MD  Emergency Medicine Resident    (Please note that portions of thisnote were completed with a voice recognition program.  Efforts were made to edit the dictations but occasionally words are mis-transcribed.)       Kevin Fan MD  Resident  07/07/21 1903       Kevin Fan MD  Resident  07/07/21 2107 alone

## 2024-12-06 NOTE — PROGRESS NOTES
Per Dr. Enzo Alvarenga, continue cardizem gtt @ 5mg/h and lopressor 5mg q6h. After 1hr, if HR continues to be elevated, stop cardizem gtt and give amio bolus. Natalya, primary RN, notified. patients with extremes of muscle mass, extra-renal metabolism of creatinine, excessive creatine ingestion, or following therapy that affects renal tubular secretion.      Calcium 12/05/2024 9.5  8.5 - 10.1 MG/DL Final    Total Bilirubin 12/05/2024 0.7  0.2 - 1.0 MG/DL Final    ALT 12/05/2024 96 (H)  12 - 78 U/L Final    AST 12/05/2024 141 (H)  15 - 37 U/L Final    Alk Phosphatase 12/05/2024 98  45 - 117 U/L Final    Total Protein 12/05/2024 7.3  6.4 - 8.2 g/dL Final    Albumin 12/05/2024 4.1  3.5 - 5.0 g/dL Final    Globulin 12/05/2024 3.2  2.0 - 4.0 g/dL Final    Albumin/Globulin Ratio 12/05/2024 1.3  1.1 - 2.2   Final   Office Visit on 11/12/2024   Component Date Value Ref Range Status    Diagnosis: 11/12/2024 Comment   Final    Comment: NEGATIVE FOR INTRAEPITHELIAL LESION OR MALIGNANCY.  FUNGAL ORGANISMS MORPHOLOGICALLY CONSISTENT WITH CANDIDA SPECIES ARE  PRESENT.      Specimen adequacy: 11/12/2024 Comment   Final    Satisfactory for evaluation. No endocervical component is identified.    Clinician Provided ICD 11/12/2024 Comment   Final    Z12.4    Performed by: 11/12/2024 Comment   Final    Britney Mtz, Cytotechnologist (ASCP)    . 11/12/2024 .   Final    Note: 11/12/2024 Comment   Final    Comment: The Pap smear is a screening test designed to aid in the detection of  premalignant and malignant conditions of the uterine cervix.  It is not a  diagnostic procedure and should not be used as the sole means of detecting  cervical cancer.  Both false-positive and false-negative reports do occur.         Methodology: 11/12/2024 Comment   Final    Comment: This liquid based ThinPrep(R) pap test was screened with the  use of an image guided system.      HPV Aptima 11/12/2024 Positive (A)  Negative Final    Comment: This nucleic acid amplification test detects fourteen high-risk  HPV types (16,18,31,33,35,39,45,51,52,56,58,59,66,68) without  differentiation.      HPV Genotype Reflex 11/12/2024 Comment   Final     Criteria met, see HPV Genotype results.    HPV, Genotype 16 11/12/2024 Negative  Negative Final    HPV Genotype 18 and 45 11/12/2024 Negative  Negative Final             KAREN MARRUFO NP  Available via [employer]